# Patient Record
Sex: MALE | Race: WHITE | NOT HISPANIC OR LATINO | Employment: UNEMPLOYED | URBAN - NONMETROPOLITAN AREA
[De-identification: names, ages, dates, MRNs, and addresses within clinical notes are randomized per-mention and may not be internally consistent; named-entity substitution may affect disease eponyms.]

---

## 2017-01-30 ENCOUNTER — APPOINTMENT (OUTPATIENT)
Dept: SLEEP MEDICINE | Facility: HOSPITAL | Age: 47
End: 2017-01-30
Attending: PSYCHIATRY & NEUROLOGY

## 2017-01-30 ENCOUNTER — APPOINTMENT (OUTPATIENT)
Dept: SLEEP MEDICINE | Facility: HOSPITAL | Age: 47
End: 2017-01-30

## 2017-01-30 DIAGNOSIS — G47.34 NOCTURNAL OXYGEN DESATURATION: ICD-10-CM

## 2017-01-30 DIAGNOSIS — G47.33 OBSTRUCTIVE SLEEP APNEA: ICD-10-CM

## 2017-01-30 DIAGNOSIS — G47.19 EXCESSIVE DAYTIME SLEEPINESS: ICD-10-CM

## 2017-01-30 PROCEDURE — 95811 POLYSOM 6/>YRS CPAP 4/> PARM: CPT

## 2017-02-23 ENCOUNTER — OFFICE VISIT (OUTPATIENT)
Dept: NEUROLOGY | Facility: CLINIC | Age: 47
End: 2017-02-23

## 2017-02-23 VITALS
DIASTOLIC BLOOD PRESSURE: 82 MMHG | HEIGHT: 69 IN | BODY MASS INDEX: 46.65 KG/M2 | HEART RATE: 104 BPM | OXYGEN SATURATION: 99 % | SYSTOLIC BLOOD PRESSURE: 138 MMHG | WEIGHT: 315 LBS | RESPIRATION RATE: 16 BRPM

## 2017-02-23 DIAGNOSIS — G47.19 EXCESSIVE DAYTIME SLEEPINESS: ICD-10-CM

## 2017-02-23 DIAGNOSIS — G47.33 OBSTRUCTIVE SLEEP APNEA: Primary | ICD-10-CM

## 2017-02-23 DIAGNOSIS — G47.34 NOCTURNAL OXYGEN DESATURATION: ICD-10-CM

## 2017-02-23 PROCEDURE — 99214 OFFICE O/P EST MOD 30 MIN: CPT | Performed by: PSYCHIATRY & NEUROLOGY

## 2017-02-23 NOTE — PROGRESS NOTES
[facility] NEUROLOGY [place] PROGRESS NOTE  History of Present Illness     Date: 2/23/2017    Patient Identification  Ladarius Jones is a 46 y.o. male.    Patient information was obtained from patient.  History/Exam limitations: none.    Original consultation requested by:       Chief Complaint   Sleeping Problem and Follow-up      History of Present Illness   Patient is a delightful 46-year-old who has been diagnosed with obstructive sleep apnea.  Patient underwent nasal CPAP titration since last visit, the optimum pressure was found to be 9 cm water pressure.   REM sleep rebound was quite apparent that such pressure.    .  PMH:   Past Medical History   Diagnosis Date   • Hypertension    • Sleep apnea        Past Surgical History: No past surgical history on file.    Family Hisotry:   Family History   Problem Relation Age of Onset   • Diabetes Mother    • Diabetes Father    • Heart disease Father    • Hypertension Father    • Stroke Father        Social History:   Social History     Social History   • Marital status:      Spouse name: N/A   • Number of children: N/A   • Years of education: N/A     Occupational History   • Not on file.     Social History Main Topics   • Smoking status: Never Smoker   • Smokeless tobacco: Never Used   • Alcohol use Yes      Comment: occasional   • Drug use: No   • Sexual activity: Defer     Other Topics Concern   • Not on file     Social History Narrative       Medications:   Current Outpatient Prescriptions   Medication Sig Dispense Refill   • fexofenadine (ALLEGRA) 180 MG tablet TAKE 1 TABLET BY MOUTH DAILY  5   • fluticasone (FLONASE) 50 MCG/ACT nasal spray INHALE 2 SPRAYS BY INTRANASAL ROUTE EVERY DAY IN EACH NOSTRIL  5   • lisinopril-hydrochlorothiazide (PRINZIDE,ZESTORETIC) 20-25 MG per tablet TAKE 1 TABLET BY ORAL ROUTE EVERY DAY  5   • meloxicam (MOBIC) 15 MG tablet TAKE 1 TABLET BY ORAL ROUTE EVERY DAY AS NEEDED  5     No current facility-administered  "medications for this visit.        Allergy: No Known Allergies    Review of Systems:  Review of Systems   Constitutional: Positive for fatigue. Negative for chills and fever.   HENT: Negative for congestion, ear pain, hearing loss, rhinorrhea and sore throat.    Eyes: Negative for pain, discharge and redness.   Respiratory: Negative for cough, shortness of breath, wheezing and stridor.    Cardiovascular: Negative for chest pain, palpitations and leg swelling.   Gastrointestinal: Negative for abdominal pain, constipation, nausea and vomiting.   Endocrine: Negative for cold intolerance, heat intolerance and polyphagia.   Genitourinary: Negative for dysuria, flank pain, frequency and urgency.   Musculoskeletal: Negative for joint swelling, myalgias, neck pain and neck stiffness.   Skin: Negative for pallor, rash and wound.   Allergic/Immunologic: Negative for environmental allergies.   Neurological: Negative for dizziness, tremors, seizures, syncope, facial asymmetry, speech difficulty, weakness, light-headedness, numbness and headaches.   Hematological: Negative for adenopathy.   Psychiatric/Behavioral: Positive for sleep disturbance. Negative for confusion and hallucinations. The patient is not nervous/anxious.        Physical Exam     Vitals:    02/23/17 1012   BP: 138/82   BP Location: Right arm   Pulse: 104   Resp: 16   SpO2: 99%   Weight: (!) 342 lb (155 kg)   Height: 69\" (175.3 cm)     GENERAL: Patient is pleasant, cooperative, appears to be stated age.  Body habitus is endomorphic.  SKIN AND EXTREMITIES:  No skin rashes or lesions are noted.  No cyanosis, clubbing or edema of the extremities.    HEAD:  Head is normocephalic and atraumatic.    NECK: Neck are non-tender without thyromegaly or adenopathy.  Carotic upstrokes are 1+/4.  No cranial or cervical bruits.  The neck is supple with a full range of motion.   ENT: palate elevate symmetrically, no evidence of high arch palate, tongue midline erythema in " posterior pharynx, Mallampati Classification Class III   CARDIOVASCULAR:  Regular rate and rhythm with normal S1 and S2 without rub or gallop.  RESPIRATORY:  Clear to auscultation without wheezes or crackle   ABDOMEN:  Soft and non-tender, positive bowel sound without hepatosplenomegaly  BACK:  Back is straight without midline defect.    PSYCH:  Higher cortical function/mental status:  The patient is alert.  She is oriented x3 to time, place and person.  Recent and the remote memory appear normal.  The patient has a good fund of knowledge.  There is no visual or auditory hallucination or suicidal or homicidal ideation.  SPEECH:There is no gross evidence of aphasia, dysarthria or agnosia.      CRANIAL NERVES:  Pupils are 4mm, equal round reactive to light, reacting briskly to 2mm without afferent pupillary defect.  Visual fields are intact to confrontation testing.  Fundoscopic examination reveals sharp disk margins with normal vasculature.  No papilledema, hemorrhages or exudates.  Extraocular movements are full and smooth with normal pursuits and saccades.  No nystagmus noted.  The face is symmetric. palate elevate symmetrically, Tongue midline, positive gag reflex. The remainder of the cranial nerves are intact and symmetrical.    MOTOR: Strength is 5/5 throughout with normal tone and bulk with the following exceptions, 4/5 intrinsic muscles of the hands and feet.  No involuntary movements noted.    Deep Tendon Reflexes: are 2/4 and symmetrical in the upper extremities, 2/4 and symmetrical at the knees and 1/4 and symmetrical at the Achilles tendon.  Plantar responses were down-going bilaterally.    SENSATION:  Intact to pinprick, light touch, vibration and proprioception.  Coordination:  The patient normally performs finger-nose-finger, heel-to-knee-to-shin and rapid alternating movements in symmetrical fashion.    COORDINATION AND GAIT:  The patient walks with a narrow-based gait.  Patient is able to heel-toe  and tandem walk forward and backwards without difficulty.  Romberg and monopedal  Romberg are negative.    MUSCULOSKELETAL: Range of motion normal, no clubbing, cyanosis, or edema.  No joint swelling.            Studies: I have personally reviewed the following and discussed with the patient.  Results for orders placed or performed during the hospital encounter of 06/17/14   CBC and Differential   Result Value Ref Range    WBC 8.9 4.8 - 10.8 THOUS    RBC 5.11 4.70 - 6.10 m/uL    Hemoglobin 14.2 14.0 - 18.0 g/dL    Hematocrit 42 42 - 52 %    MCV 83.0 80.0 - 94.0 fL    MCH 27.7 27.0 - 31.0 uug    MCHC 33.4 30.0 - 37.0 g/dL    RDW 13.7 11.5 - 14.5 %    Platelets 267 130 - 400 THOUS    Neutrophil Rel % 68.7 37.0 - 80.0 %    Lymphocyte Rel % 24.2 10.0 - 50.0 %    Monocyte Rel % 5.1 0.0 - 12.0 %    Eosinophil Rel % 1.5 0.0 - 7.0 %    Basophil Rel % 0.50 0.00 - 2.50 %    Neutrophils Absolute 6.10 2.00 - 6.90 THOUS    Lymphocytes Absolute 2.20 0.60 - 3.40 THOUS    Monocytes Absolute 0.50 0.00 - 0.90 THOUS    Eosinophils Absolute 0.10 0.00 - 0.70 THOUS    Basophils Absolute 0.00 0.00 - 0.20 THOUS   Protime-INR   Result Value Ref Range    Patient On Coumadin? N     Protime 12.3 (H) 9.3 - 12.1 SEC    INR 1.1 0.9 - 1.1   APTT   Result Value Ref Range    Patient On Heparin? .     PTT 30 25 - 36 SEC   Comprehensive metabolic panel   Result Value Ref Range    Sodium 144 137 - 145 mmol/L    Potassium 4.3 3.5 - 5.1 mmol/L    Chloride 105 98 - 107 mmol/L    CO2 29 26 - 30 mmol/L    Anion Gap 14 10 - 20 mmol/L    BUN 13 7 - 20 mg/dL    Creatinine 0.8 0.6 - 1.3 mg/dL    BUN/Creatinine Ratio 16.7 6.3 - 21.9    eGFR >60 mL/min    Glucose 122 (H) 74 - 98 mg/dL    Calcium 9.0 8.4 - 10.2 mg/dL    Total Bilirubin 0.5 0.2 - 1.3 mg/dL    AST (SGOT) 105 (H) 15 - 46 U/L    ALT (SGPT) 85 (H) 13 - 69 U/L    Total Protein 7.7 6.3 - 8.2 g/dL    Albumin 4.2 3.5 - 5.0 g/dL    A/G Ratio 1.2 1.0 - 2.0    Alkaline Phosphatase 88 38 - 126 U/L   Conv  EKG-Lab   Result Value Ref Range    EKG-LAB **    Urinalysis with microscopic   Result Value Ref Range    Color, UA Yellow     Appearance, UA Clear     Glucose, UA Negative NEGATIVE    Bilirubin, UA Negative NEGATIVE    Ketones, UA Negative NEGATIVE    Specific Gravity, UA 1.020 1.003 - 1.035    Blood, UA Negative NEGATIVE    pH, UA 6.5 5.0 - 8.0    Protein, UA Negative NEGATIVE    Urobilinogen, UA 0.2 0.2    Nitrite, UA Negative NEGATIVE    Leukocytes, UA Negative NEGATIVE    WBC, UA 0-3 0 - 3    RBC, UA 0-3 0 - 3    Epithelial Cells, UA 0-3 0 - 3   Basic metabolic panel   Result Value Ref Range    Sodium 139 137 - 145 mmol/L    Potassium 4.5 3.5 - 5.1 mmol/L    Chloride 106 98 - 107 mmol/L    CO2 29 26 - 30 mmol/L    Anion Gap 9 (L) 10 - 20 mmol/L    BUN 13 7 - 20 mg/dL    Creatinine 0.8 0.6 - 1.3 mg/dL    eGFR >60 mL/min    Glucose 100 (H) 74 - 98 mg/dL    Calcium 8.9 8.4 - 10.2 mg/dL       Records Reviewed: I have personally reviewed his previous medical record.    Ladarius was seen today for sleeping problem and follow-up.    Diagnoses and all orders for this visit:    Obstructive sleep apnea    Excessive daytime sleepiness    Nocturnal oxygen desaturation    Treatments:  1. Patient was diagnosis of obstructive sleep apnea counseled patient extensively the importance of being compliant with using nasal CPAP  2.  With discussed about weight reduction would also be beneficial  3.  We have discussed about the cardio vascular complication of obstructive sleep apnea if left untreated  4.   We've counseled patient on sleep apnea is follow  I have counseled patient extensively on Sleep Hygiene including regular sleep wake schedule and stimulus control therapy.  I have also discussed the importance of weight reduction because 10% reduction in body weight can reduce sleep apnea by 50 %. We have also discussed abstaining from smoking and drinking.  I have explained to patient that obstructive apnea episode is defined as the  absence of airflow for at least 10 seconds.  Sleep apnea is usually accompanied by snoring, disturbed sleep, and daytime sleepiness. Patients with micrognathia, retrognathia, enlarged tonsils, tongue enlargement, and acromegaly are especially predisposed to obstructive sleep apnea. Abnormalities or weakness in the muscles can also contribute to obstructive sleep apnea. Obesity can also contribute to sleep apnea.     Sleep apnea can lead to a number of complications, ranging from daytime sleepiness to possible increased risk of cardiovascular risks.   Daytime sleepiness is the most serious.  Daytime sleepiness can also increase the risk for accident-related injuries. Several studies have suggested that people with sleep apnea have two to three times as many car accidents, and five to seven times the risk for multiple accidents.   A number of cardiovascular diseases -- including high blood pressure, heart failure, stroke, and heart arrhythmias -- have an association with obstructive sleep apnea.   Up to a third of patients with heart failure also have sleep apnea. Both central and obstructive sleep apnea are linked with heart failure. Obstructive sleep apnea is also noted to be associated with type 2 diabetes according to Dr. Guillaume at [facility].  The best treatment for symptomatic obstructive sleep apnea is continuous positive airflow pressure (CPAP). Bilevel positive airway pressure (BPAP) systems may be particularly helpful for patients with coexisting lung disease and those with excessive levels of carbon dioxide.  Other treatment options including UPPP surgery, LAUP surgery, radiofrequency somnoplasty and dental appliances such Kian or Clearway.    This Document is signed by Takn Burr MD, FAAN, FAASM   February 23, 20174:13 PM

## 2017-03-16 ENCOUNTER — TELEPHONE (OUTPATIENT)
Dept: NEUROLOGY | Facility: CLINIC | Age: 47
End: 2017-03-16

## 2017-04-28 ENCOUNTER — OFFICE VISIT (OUTPATIENT)
Dept: NEUROLOGY | Facility: CLINIC | Age: 47
End: 2017-04-28

## 2017-04-28 VITALS
SYSTOLIC BLOOD PRESSURE: 146 MMHG | OXYGEN SATURATION: 98 % | HEIGHT: 69 IN | HEART RATE: 93 BPM | WEIGHT: 315 LBS | BODY MASS INDEX: 46.65 KG/M2 | DIASTOLIC BLOOD PRESSURE: 90 MMHG

## 2017-04-28 DIAGNOSIS — G47.33 OBSTRUCTIVE SLEEP APNEA: Primary | ICD-10-CM

## 2017-04-28 DIAGNOSIS — G47.19 EXCESSIVE DAYTIME SLEEPINESS: ICD-10-CM

## 2017-04-28 DIAGNOSIS — G47.34 NOCTURNAL OXYGEN DESATURATION: ICD-10-CM

## 2017-04-28 PROCEDURE — 99213 OFFICE O/P EST LOW 20 MIN: CPT | Performed by: PSYCHIATRY & NEUROLOGY

## 2017-04-28 RX ORDER — AZELASTINE 1 MG/ML
SPRAY, METERED NASAL
COMMUNITY
Start: 2017-04-25

## 2017-04-29 NOTE — PROGRESS NOTES
[facility] NEUROLOGY [place] PROGRESS NOTE  History of Present Illness     Date: 4/28/2017    Patient Identification  Ladarius Jones is a 46 y.o. male.    Patient information was obtained from patient.  History/Exam limitations: none.    Original consultation requested by: Nidia Mac MD    Chief Complaint   Sleep Apnea (Pt here for follow up on [facility])      History of Present Illness   Patient is a pleasant 46-year-old with a diagnosis of obstructive sleep apnea.  Patient has been using his nasal CPAP religiously.  Patient was placed on nasal CPAP at 9 cm water pressure oxygen saturation improved to 95%.  Patient has been using his nasal CPAP 100% over the last week.  We have stressed the importance of compliant with nasal CPAP to avoid to cardiovascular patient obstructive sleep apnea with also discussed about the importance of weight reduction because 10% reduction in body weight can reduce sleep apnea by 50%.    PMH:   Past Medical History:   Diagnosis Date   • Hypertension    • Sleep apnea        Past Surgical History: No past surgical history on file.    Family Hisotry:   Family History   Problem Relation Age of Onset   • Diabetes Mother    • Diabetes Father    • Heart disease Father    • Hypertension Father    • Stroke Father        Social History:   Social History     Social History   • Marital status:      Spouse name: N/A   • Number of children: N/A   • Years of education: N/A     Occupational History   • Not on file.     Social History Main Topics   • Smoking status: Never Smoker   • Smokeless tobacco: Never Used   • Alcohol use Yes      Comment: occasional   • Drug use: No   • Sexual activity: Defer     Other Topics Concern   • Not on file     Social History Narrative       Medications:   Current Outpatient Prescriptions   Medication Sig Dispense Refill   • azelastine (ASTELIN) 0.1 % nasal spray One spray each nostril at night as needed     • fexofenadine (ALLEGRA) 180 MG tablet TAKE 1  "TABLET BY MOUTH DAILY  5   • fluticasone (FLONASE) 50 MCG/ACT nasal spray INHALE 2 SPRAYS BY INTRANASAL ROUTE EVERY DAY IN EACH NOSTRIL  5   • lisinopril-hydrochlorothiazide (PRINZIDE,ZESTORETIC) 20-25 MG per tablet TAKE 1 TABLET BY ORAL ROUTE EVERY DAY  5   • meloxicam (MOBIC) 15 MG tablet TAKE 1 TABLET BY ORAL ROUTE EVERY DAY AS NEEDED  5     No current facility-administered medications for this visit.        Allergy: No Known Allergies    Review of Systems:  Review of Systems   Constitutional: Positive for fatigue. Negative for chills and fever.   HENT: Negative for congestion, ear pain, hearing loss, rhinorrhea and sore throat.    Eyes: Negative for pain, discharge and redness.   Respiratory: Positive for apnea. Negative for cough, shortness of breath, wheezing and stridor.    Cardiovascular: Negative for chest pain, palpitations and leg swelling.   Gastrointestinal: Negative for abdominal pain, constipation, nausea and vomiting.   Endocrine: Negative for cold intolerance, heat intolerance and polyphagia.   Genitourinary: Negative for dysuria, flank pain, frequency and urgency.   Musculoskeletal: Negative for joint swelling, myalgias, neck pain and neck stiffness.   Skin: Negative for pallor, rash and wound.   Allergic/Immunologic: Negative for environmental allergies.   Neurological: Negative for dizziness, tremors, seizures, syncope, facial asymmetry, speech difficulty, weakness, light-headedness, numbness and headaches.   Hematological: Negative for adenopathy.   Psychiatric/Behavioral: Positive for sleep disturbance. Negative for confusion and hallucinations. The patient is not nervous/anxious.        Physical Exam     Vitals:    04/28/17 1123   BP: 146/90   Pulse: 93   SpO2: 98%   Weight: (!) 335 lb (152 kg)   Height: 69\" (175.3 cm)     GENERAL: Patient is pleasant, cooperative, appears to be stated age.  Body habitus is endomorphic.  SKIN AND EXTREMITIES:  No skin rashes or lesions are noted.  No cyanosis, " clubbing or edema of the extremities.    HEAD:  Head is normocephalic and atraumatic.    NECK: Neck are non-tender without thyromegaly or adenopathy.  Carotic upstrokes are 1+/4.  No cranial or cervical bruits.  The neck is supple with a full range of motion.   ENT: palate elevate symmetrically, no evidence of high arch palate, tongue midline erythema in posterior pharynx, Mallampati Classification Class III   CARDIOVASCULAR:  Regular rate and rhythm with normal S1 and S2 without rub or gallop.  RESPIRATORY:  Clear to auscultation without wheezes or crackle   ABDOMEN:  Soft and non-tender, positive bowel sound without hepatosplenomegaly  BACK:  Back is straight without midline defect.    PSYCH:  Higher cortical function/mental status:  The patient is alert.  She is oriented x3 to time, place and person.  Recent and the remote memory appear normal.  The patient has a good fund of knowledge.  There is no visual or auditory hallucination or suicidal or homicidal ideation.  SPEECH:There is no gross evidence of aphasia, dysarthria or agnosia.      CRANIAL NERVES:  Pupils are 4mm, equal round reactive to light, reacting briskly to 2mm without afferent pupillary defect.  Visual fields are intact to confrontation testing.  Fundoscopic examination reveals sharp disk margins with normal vasculature.  No papilledema, hemorrhages or exudates.  Extraocular movements are full and smooth with normal pursuits and saccades.  No nystagmus noted.  The face is symmetric. palate elevate symmetrically, Tongue midline, positive gag reflex. The remainder of the cranial nerves are intact and symmetrical.    MOTOR: Strength is 5/5 throughout with normal tone and bulk with the following exceptions, 4/5 intrinsic muscles of the hands and feet.  No involuntary movements noted.    Deep Tendon Reflexes: are 2/4 and symmetrical in the upper extremities, 2/4 and symmetrical at the knees and 1/4 and symmetrical at the Achilles tendon.  Plantar  responses were down-going bilaterally.    SENSATION:  Intact to pinprick, light touch, vibration and proprioception.  Coordination:  The patient normally performs finger-nose-finger, heel-to-knee-to-shin and rapid alternating movements in symmetrical fashion.    COORDINATION AND GAIT:  The patient walks with a narrow-based gait.  Patient is able to heel-toe and tandem walk forward and backwards without difficulty.  Romberg and monopedal  Romberg are negative.    MUSCULOSKELETAL: Range of motion normal, no clubbing, cyanosis, or edema.  No joint swelling.            Studies: I have personally reviewed the following and discussed with the patient.  Results for orders placed or performed during the hospital encounter of 06/17/14   CBC and Differential   Result Value Ref Range    WBC 8.9 4.8 - 10.8 THOUS    RBC 5.11 4.70 - 6.10 m/uL    Hemoglobin 14.2 14.0 - 18.0 g/dL    Hematocrit 42 42 - 52 %    MCV 83.0 80.0 - 94.0 fL    MCH 27.7 27.0 - 31.0 uug    MCHC 33.4 30.0 - 37.0 g/dL    RDW 13.7 11.5 - 14.5 %    Platelets 267 130 - 400 THOUS    Neutrophil Rel % 68.7 37.0 - 80.0 %    Lymphocyte Rel % 24.2 10.0 - 50.0 %    Monocyte Rel % 5.1 0.0 - 12.0 %    Eosinophil Rel % 1.5 0.0 - 7.0 %    Basophil Rel % 0.50 0.00 - 2.50 %    Neutrophils Absolute 6.10 2.00 - 6.90 THOUS    Lymphocytes Absolute 2.20 0.60 - 3.40 THOUS    Monocytes Absolute 0.50 0.00 - 0.90 THOUS    Eosinophils Absolute 0.10 0.00 - 0.70 THOUS    Basophils Absolute 0.00 0.00 - 0.20 THOUS   Protime-INR   Result Value Ref Range    Patient On Coumadin? N     Protime 12.3 (H) 9.3 - 12.1 SEC    INR 1.1 0.9 - 1.1   APTT   Result Value Ref Range    Patient On Heparin? .     PTT 30 25 - 36 SEC   Comprehensive metabolic panel   Result Value Ref Range    Sodium 144 137 - 145 mmol/L    Potassium 4.3 3.5 - 5.1 mmol/L    Chloride 105 98 - 107 mmol/L    CO2 29 26 - 30 mmol/L    Anion Gap 14 10 - 20 mmol/L    BUN 13 7 - 20 mg/dL    Creatinine 0.8 0.6 - 1.3 mg/dL     BUN/Creatinine Ratio 16.7 6.3 - 21.9    eGFR >60 mL/min    Glucose 122 (H) 74 - 98 mg/dL    Calcium 9.0 8.4 - 10.2 mg/dL    Total Bilirubin 0.5 0.2 - 1.3 mg/dL    AST (SGOT) 105 (H) 15 - 46 U/L    ALT (SGPT) 85 (H) 13 - 69 U/L    Total Protein 7.7 6.3 - 8.2 g/dL    Albumin 4.2 3.5 - 5.0 g/dL    A/G Ratio 1.2 1.0 - 2.0    Alkaline Phosphatase 88 38 - 126 U/L   Conv EKG-Lab   Result Value Ref Range    EKG-LAB **    Urinalysis with microscopic   Result Value Ref Range    Color, UA Yellow     Appearance, UA Clear     Glucose, UA Negative NEGATIVE    Bilirubin, UA Negative NEGATIVE    Ketones, UA Negative NEGATIVE    Specific Gravity, UA 1.020 1.003 - 1.035    Blood, UA Negative NEGATIVE    pH, UA 6.5 5.0 - 8.0    Protein, UA Negative NEGATIVE    Urobilinogen, UA 0.2 0.2    Nitrite, UA Negative NEGATIVE    Leukocytes, UA Negative NEGATIVE    WBC, UA 0-3 0 - 3    RBC, UA 0-3 0 - 3    Epithelial Cells, UA 0-3 0 - 3   Basic metabolic panel   Result Value Ref Range    Sodium 139 137 - 145 mmol/L    Potassium 4.5 3.5 - 5.1 mmol/L    Chloride 106 98 - 107 mmol/L    CO2 29 26 - 30 mmol/L    Anion Gap 9 (L) 10 - 20 mmol/L    BUN 13 7 - 20 mg/dL    Creatinine 0.8 0.6 - 1.3 mg/dL    eGFR >60 mL/min    Glucose 100 (H) 74 - 98 mg/dL    Calcium 8.9 8.4 - 10.2 mg/dL       Review of Imaging: I have personally reviewed the following images and discussed with the patient.  No results found.      Records Reviewed: I have personally reviewed his previous medical record.    Ladarius was seen today for sleep apnea.    Diagnoses and all orders for this visit:    Obstructive sleep apnea    Excessive daytime sleepiness    Nocturnal oxygen desaturation      Treatments:  1.  Avoid sleep deprivation  2.  Encourage recklessly wake schedule  3.  Encourage compliant with nasal CPAP  4.  Counseled patient on sleep apnea as follow  I have counseled patient extensively on Sleep Hygiene including regular sleep wake schedule and stimulus control therapy.  I  have also discussed the importance of weight reduction because 10% reduction in body weight can reduce sleep apnea by 50 %. We have also discussed abstaining from smoking and drinking.  I have explained to patient that obstructive apnea episode is defined as the absence of airflow for at least 10 seconds.  Sleep apnea is usually accompanied by snoring, disturbed sleep, and daytime sleepiness. Patients with micrognathia, retrognathia, enlarged tonsils, tongue enlargement, and acromegaly are especially predisposed to obstructive sleep apnea. Abnormalities or weakness in the muscles can also contribute to obstructive sleep apnea. Obesity can also contribute to sleep apnea.     Sleep apnea can lead to a number of complications, ranging from daytime sleepiness to possible increased risk of cardiovascular risks.   Daytime sleepiness is the most serious.  Daytime sleepiness can also increase the risk for accident-related injuries. Several studies have suggested that people with sleep apnea have two to three times as many car accidents, and five to seven times the risk for multiple accidents.   A number of cardiovascular diseases -- including high blood pressure, heart failure, stroke, and heart arrhythmias -- have an association with obstructive sleep apnea.   Up to a third of patients with heart failure also have sleep apnea. Both central and obstructive sleep apnea are linked with heart failure. Obstructive sleep apnea is also noted to be associated with type 2 diabetes according to Dr. Guillaume at [facility].  The best treatment for symptomatic obstructive sleep apnea is continuous positive airflow pressure (CPAP). Bilevel positive airway pressure (BPAP) systems may be particularly helpful for patients with coexisting lung disease and those with excessive levels of carbon dioxide.  Other treatment options including UPPP surgery, LAUP surgery, radiofrequency somnoplasty and dental appliances such [place] or  Clearway.  This Document is signed by Tank Burr MD, FAAN, FAASM   April 28, 20179:04 PM

## 2017-08-29 ENCOUNTER — OFFICE VISIT (OUTPATIENT)
Dept: NEUROLOGY | Facility: CLINIC | Age: 47
End: 2017-08-29

## 2017-08-29 VITALS
HEART RATE: 91 BPM | OXYGEN SATURATION: 98 % | DIASTOLIC BLOOD PRESSURE: 74 MMHG | WEIGHT: 315 LBS | HEIGHT: 69 IN | SYSTOLIC BLOOD PRESSURE: 144 MMHG | BODY MASS INDEX: 46.65 KG/M2

## 2017-08-29 DIAGNOSIS — G47.34 NOCTURNAL OXYGEN DESATURATION: ICD-10-CM

## 2017-08-29 DIAGNOSIS — G47.33 OBSTRUCTIVE SLEEP APNEA: Primary | ICD-10-CM

## 2017-08-29 DIAGNOSIS — G47.19 EXCESSIVE DAYTIME SLEEPINESS: ICD-10-CM

## 2017-08-29 PROCEDURE — 99213 OFFICE O/P EST LOW 20 MIN: CPT | Performed by: PSYCHIATRY & NEUROLOGY

## 2017-08-29 RX ORDER — CELECOXIB 200 MG/1
1 CAPSULE ORAL 2 TIMES DAILY
Refills: 0 | COMMUNITY
Start: 2017-07-19

## 2017-08-29 RX ORDER — TRIAMCINOLONE ACETONIDE 1 MG/G
CREAM TOPICAL
Refills: 2 | COMMUNITY
Start: 2017-07-19

## 2017-08-29 RX ORDER — TOPIRAMATE 50 MG/1
1 TABLET, FILM COATED ORAL 2 TIMES DAILY
Refills: 2 | COMMUNITY
Start: 2017-08-18

## 2017-08-29 RX ORDER — SPIRONOLACTONE 50 MG/1
1 TABLET, FILM COATED ORAL DAILY
Refills: 2 | COMMUNITY
Start: 2017-08-18

## 2017-09-02 NOTE — PROGRESS NOTES
[facility] NEUROLOGY [place] PROGRESS NOTE  History of Present Illness     Date: August 29, 2017  Patient Identification  Ladarius Jones is a 46 y.o. male.    Patient information was obtained from patient.  History/Exam limitations: none.    Original consultation requested by: Mike Ramesh D.O.  Chief Complaint   Sleep Apnea (Pt in office for 1 month follow up, CPAP compliance )      History of Present Illness   Patient is a pleasant 46-year-old referred to [facility] for evaluation of obstructive sleep apnea patient been diagnosis of obstructive sleep apnea been placed on nasal CPAP and patient been using nasal CPAP religiously and the compliance meter show a 100% compliance over the last 30 days and on the average patient sleep for 7 hours 15 minute 52 seconds and 100% is over 4 hours of sleep at night and apnea probably index was 2 events per hour.  Patient reported to be more awake and alert in the daytime patient denied any problem with her nasal CPAP.      PMH:   Past Medical History:   Diagnosis Date   • Hypertension    • Sleep apnea        Past Surgical History: No past surgical history on file.    Family Hisotry:   Family History   Problem Relation Age of Onset   • Diabetes Mother    • Diabetes Father    • Heart disease Father    • Hypertension Father    • Stroke Father        Social History:   Social History     Social History   • Marital status:      Spouse name: N/A   • Number of children: N/A   • Years of education: N/A     Occupational History   • Not on file.     Social History Main Topics   • Smoking status: Never Smoker   • Smokeless tobacco: Never Used   • Alcohol use Yes      Comment: occasional   • Drug use: No   • Sexual activity: Defer     Other Topics Concern   • Not on file     Social History Narrative       Medications:   Current Outpatient Prescriptions   Medication Sig Dispense Refill   • azelastine (ASTELIN) 0.1 % nasal spray One spray each nostril  at night as needed     • celecoxib (CeleBREX) 200 MG capsule 1 capsule 2 (Two) Times a Day.  0   • fexofenadine (ALLEGRA) 180 MG tablet TAKE 1 TABLET BY MOUTH DAILY  5   • fluticasone (FLONASE) 50 MCG/ACT nasal spray INHALE 2 SPRAYS BY INTRANASAL ROUTE EVERY DAY IN EACH NOSTRIL  5   • lisinopril-hydrochlorothiazide (PRINZIDE,ZESTORETIC) 20-25 MG per tablet TAKE 1 TABLET BY ORAL ROUTE EVERY DAY  5   • spironolactone (ALDACTONE) 50 MG tablet 1 tablet Daily.  2   • topiramate (TOPAMAX) 50 MG tablet 1 tablet 2 (Two) Times a Day.  2   • triamcinolone (KENALOG) 0.1 % cream APPLY A THIN LAYER TO THE AFFECTED AREA TWICE DAILY  2     No current facility-administered medications for this visit.        Allergy: No Known Allergies    Review of Systems:  Review of Systems   Constitutional: Positive for fatigue. Negative for chills and fever.   HENT: Negative for congestion, ear pain, hearing loss, rhinorrhea and sore throat.    Eyes: Negative for pain, discharge and redness.   Respiratory: Positive for apnea. Negative for cough, shortness of breath, wheezing and stridor.    Cardiovascular: Negative for chest pain, palpitations and leg swelling.   Gastrointestinal: Negative for abdominal pain, constipation, nausea and vomiting.   Endocrine: Negative for cold intolerance, heat intolerance and polyphagia.   Genitourinary: Negative for dysuria, flank pain, frequency and urgency.   Musculoskeletal: Negative for joint swelling, myalgias, neck pain and neck stiffness.   Skin: Negative for pallor, rash and wound.   Allergic/Immunologic: Negative for environmental allergies.   Neurological: Negative for dizziness, tremors, seizures, syncope, facial asymmetry, speech difficulty, weakness, light-headedness, numbness and headaches.   Hematological: Negative for adenopathy.   Psychiatric/Behavioral: Positive for sleep disturbance. Negative for confusion and hallucinations. The patient is not nervous/anxious.        Physical Exam     Vitals:  "   08/29/17 1611   BP: 144/74   Pulse: 91   SpO2: 98%   Weight: (!) 335 lb (152 kg)   Height: 69\" (175.3 cm)     GENERAL: Patient is pleasant, cooperative, appears to be stated age.  Body habitus is endomorphic.  SKIN AND EXTREMITIES:  No skin rashes or lesions are noted.  No cyanosis, clubbing or edema of the extremities.    HEAD:  Head is normocephalic and atraumatic.    NECK: Neck are non-tender without thyromegaly or adenopathy.  Carotic upstrokes are 1+/4.  No cranial or cervical bruits.  The neck is supple with a full range of motion.   ENT: palate elevate symmetrically, no evidence of high arch palate, tongue midline erythema in posterior pharynx, Mallampati Classification Class III   CARDIOVASCULAR:  Regular rate and rhythm with normal S1 and S2 without rub or gallop.  RESPIRATORY:  Clear to auscultation without wheezes or crackle   ABDOMEN:  Soft and non-tender, positive bowel sound without hepatosplenomegaly  BACK:  Back is straight without midline defect.    PSYCH:  Higher cortical function/mental status:  The patient is alert.  She is oriented x3 to time, place and person.  Recent and the remote memory appear normal.  The patient has a good fund of knowledge.  There is no visual or auditory hallucination or suicidal or homicidal ideation.  SPEECH:There is no gross evidence of aphasia, dysarthria or agnosia.      CRANIAL NERVES:  Pupils are 4mm, equal round reactive to light, reacting briskly to 2mm without afferent pupillary defect.  Visual fields are intact to confrontation testing.  Fundoscopic examination reveals sharp disk margins with normal vasculature.  No papilledema, hemorrhages or exudates.  Extraocular movements are full and smooth with normal pursuits and saccades.  No nystagmus noted.  The face is symmetric. palate elevate symmetrically, Tongue midline, positive gag reflex. The remainder of the cranial nerves are intact and symmetrical.    MOTOR: Strength is 5/5 throughout with normal tone " and bulk with the following exceptions, 4/5 intrinsic muscles of the hands and feet.  No involuntary movements noted.    Deep Tendon Reflexes: are 2/4 and symmetrical in the upper extremities, 2/4 and symmetrical at the knees and 1/4 and symmetrical at the Achilles tendon.  Plantar responses were down-going bilaterally.    SENSATION:  Intact to pinprick, light touch, vibration and proprioception.  Coordination:  The patient normally performs finger-nose-finger, heel-to-knee-to-shin and rapid alternating movements in symmetrical fashion.    COORDINATION AND GAIT:  The patient walks with a narrow-based gait.  Patient is able to heel-toe and tandem walk forward and backwards without difficulty.  Romberg and monopedal  Romberg are negative.    MUSCULOSKELETAL: Range of motion normal, no clubbing, cyanosis, or edema.  No joint swelling.            Studies: I have personally reviewed the following and discussed with the patient.  Results for orders placed or performed during the hospital encounter of 06/17/14   CBC and Differential   Result Value Ref Range    WBC 8.9 4.8 - 10.8 THOUS    RBC 5.11 4.70 - 6.10 m/uL    Hemoglobin 14.2 14.0 - 18.0 g/dL    Hematocrit 42 42 - 52 %    MCV 83.0 80.0 - 94.0 fL    MCH 27.7 27.0 - 31.0 uug    MCHC 33.4 30.0 - 37.0 g/dL    RDW 13.7 11.5 - 14.5 %    Platelets 267 130 - 400 THOUS    Neutrophil Rel % 68.7 37.0 - 80.0 %    Lymphocyte Rel % 24.2 10.0 - 50.0 %    Monocyte Rel % 5.1 0.0 - 12.0 %    Eosinophil Rel % 1.5 0.0 - 7.0 %    Basophil Rel % 0.50 0.00 - 2.50 %    Neutrophils Absolute 6.10 2.00 - 6.90 THOUS    Lymphocytes Absolute 2.20 0.60 - 3.40 THOUS    Monocytes Absolute 0.50 0.00 - 0.90 THOUS    Eosinophils Absolute 0.10 0.00 - 0.70 THOUS    Basophils Absolute 0.00 0.00 - 0.20 THOUS   Protime-INR   Result Value Ref Range    Patient On Coumadin? N     Protime 12.3 (H) 9.3 - 12.1 SEC    INR 1.1 0.9 - 1.1   APTT   Result Value Ref Range    Patient On Heparin? .     PTT 30 25 - 36 SEC    Comprehensive metabolic panel   Result Value Ref Range    Sodium 144 137 - 145 mmol/L    Potassium 4.3 3.5 - 5.1 mmol/L    Chloride 105 98 - 107 mmol/L    CO2 29 26 - 30 mmol/L    Anion Gap 14 10 - 20 mmol/L    BUN 13 7 - 20 mg/dL    Creatinine 0.8 0.6 - 1.3 mg/dL    BUN/Creatinine Ratio 16.7 6.3 - 21.9    eGFR >60 mL/min    Glucose 122 (H) 74 - 98 mg/dL    Calcium 9.0 8.4 - 10.2 mg/dL    Total Bilirubin 0.5 0.2 - 1.3 mg/dL    AST (SGOT) 105 (H) 15 - 46 U/L    ALT (SGPT) 85 (H) 13 - 69 U/L    Total Protein 7.7 6.3 - 8.2 g/dL    Albumin 4.2 3.5 - 5.0 g/dL    A/G Ratio 1.2 1.0 - 2.0    Alkaline Phosphatase 88 38 - 126 U/L   Conv EKG-Lab   Result Value Ref Range    EKG-LAB **    Urinalysis with microscopic   Result Value Ref Range    Color, UA Yellow     Appearance, UA Clear     Glucose, UA Negative NEGATIVE    Bilirubin, UA Negative NEGATIVE    Ketones, UA Negative NEGATIVE    Specific Gravity, UA 1.020 1.003 - 1.035    Blood, UA Negative NEGATIVE    pH, UA 6.5 5.0 - 8.0    Protein, UA Negative NEGATIVE    Urobilinogen, UA 0.2 0.2    Nitrite, UA Negative NEGATIVE    Leukocytes, UA Negative NEGATIVE    WBC, UA 0-3 0 - 3    RBC, UA 0-3 0 - 3    Epithelial Cells, UA 0-3 0 - 3   Basic metabolic panel   Result Value Ref Range    Sodium 139 137 - 145 mmol/L    Potassium 4.5 3.5 - 5.1 mmol/L    Chloride 106 98 - 107 mmol/L    CO2 29 26 - 30 mmol/L    Anion Gap 9 (L) 10 - 20 mmol/L    BUN 13 7 - 20 mg/dL    Creatinine 0.8 0.6 - 1.3 mg/dL    eGFR >60 mL/min    Glucose 100 (H) 74 - 98 mg/dL    Calcium 8.9 8.4 - 10.2 mg/dL       Records Reviewed: I have personally reviewed his previous medical record.    Ladarius was seen today for sleep apnea.    Diagnoses and all orders for this visit:    Obstructive sleep apnea    Excessive daytime sleepiness    Nocturnal oxygen desaturation      Treatments:    Discussion:  Migraine Headache  Migraine headaches are a major public health problem affecting more than 28 million persons in this  "country. Nearly 25 percent of women and 9 percent of men experience disabling migraines.    Migraine treatment depends on the duration and severity of pain, associated symptoms, degree of disability, and initial response to therapy.  A widely prescribed and effective class of medications for migraines is the 5-HT1 receptor-specific agonists (\"triptans\").  Contraindications to their use include ischemic vascular conditions, vasospastic coronary disease, uncontrolled hypertension, or other significant cardiovascular disease.  Following appropriate management of acute migraine, patients should be evaluated for initiation of preventive therapy. Factors that should prompt consideration of preventive therapy include the occurrence of two or more migraines per month with disability lasting three or more days per month; failure of, contraindication for, or adverse events from acute treatments; use of abortive medication more than twice per week; and uncommon migraine conditions (e.g., hemiplegic migraine, migraine with prolonged aura, migrainous infarction). Patient preference and cost also should be considered.  Evidence consistently supports the use of the beta blocker propranolol (Inderal) in migraine prophylaxis. Amitriptyline is a first-line agent for migraine prophylaxis and is the only antidepressant with consistent evidence supporting its effectiveness for this use. Divalproex (Depakote) and sodium valproate are well supported by evidence for use in migraine prevention.  Topamax has also been studies for migraine prophylaxis  in open label studies and double blind studies. Evidence does not support the use of diltiazem (Cardizem) in migraine prevention, and the evidence for several other calcium channel blockers, such as nifedipine (Procardia), is poor and suggests only modest effect  Menstrual Migraine can present a challenge to clinician.  Estrogen withdrawal has been shown to precipitate migraine headaches, and a " sustained elevated level of estrogen will postpone the migraine. Transdermal estrogen started just before menstruation can provide a sustained low level of estrogen, decreasing the degree of estrogen decline, and thus may prevent induction of migraines    This Document is signed by Tank Burr MD, FAAN, FAASM   August 29, 2017

## 2022-04-25 ENCOUNTER — TRANSCRIBE ORDERS (OUTPATIENT)
Dept: ADMINISTRATIVE | Facility: HOSPITAL | Age: 52
End: 2022-04-25

## 2022-04-25 DIAGNOSIS — E11.9 TYPE 2 DIABETES MELLITUS WITHOUT COMPLICATION, UNSPECIFIED WHETHER LONG TERM INSULIN USE: Primary | ICD-10-CM

## 2022-07-15 ENCOUNTER — HOSPITAL ENCOUNTER (OUTPATIENT)
Dept: NUTRITION | Facility: HOSPITAL | Age: 52
Discharge: HOME | End: 2022-07-15
Admitting: FAMILY MEDICINE

## 2022-07-15 VITALS — WEIGHT: 315 LBS | HEIGHT: 69 IN | BODY MASS INDEX: 46.65 KG/M2

## 2022-07-15 PROCEDURE — 97802 MEDICAL NUTRITION INDIV IN: CPT

## 2022-07-15 NOTE — PROGRESS NOTES
"Adult Outpatient Nutrition  Assessment    Patient Name:  Ladarius Jones  YOB: 1970  MRN: [id number]    Assessment Date:  7/15/2022    Comments:    RD met w/ pt in-person for initial nutrition assessment regarding T2DM. Pt reports that he weighs ~300# and that he often loses and regains the weight back. Pt states he does not check his blood sugars throughout the day and is unsure of what his most recent A1C lab value is. Pt states that he no longer drinks regular soda and only drinks soda w/ zero sugar.     RD reviewed consistent carbohydrate diet education w/ pt. RD recommended pt decrease his current carbohydrate consumption at mealtimes and to consume carbs in moderation.     One goal was set during the session:   1. Pair protein w/ carbohydrate at meal and snack times.     Pt agreeable and notes goal is feasible. Pt denies any further nutrition-related questions/concerns at this time. Pt does not wish to set-up a follow-up appointment at this time. Pt w/ RD contact information and is encouraged to reach out w/ questions/concerns. RD available PRN.    General Info     Row Name 07/15/22 1435       Today's Session    Person(s) attending today's session Patient     Services Used Today? No       General Information    How Well Do You Speak English? very well    Preferred Language English    Are you able to read and write English? Yes    People in Home spouse               Physical Findings     Row Name 07/15/22 1435          Physical Findings    Overall Physical Appearance obese                Anthropometrics     Row Name 07/15/22 1437 07/15/22 1435       Anthropometrics    Height -- 175.3 cm (69\")    Weight -- 152 kg (335 lb)    Age for Calculations 51 --    Height for Calculation 1.753 m (5' 9\") --    Weight for Calculation 152 kg (335 lb) --               Retired Nutritional Info/Activity     Row Name 07/15/22 1436          Eating Environment    Eating environment Family            " "Physical Activity    Are you currently involved in an activity/exercise program?  No     Reasons for Inactivity Limited activity                Home Nutrition Report     Row Name 07/15/22 1437          Home Nutrition Report    Typical Intake (Food/Fluid/EN/PN) 3 meals w/ 2 snacks                Estimated/Assessed Needs - Anthropometrics     Row Name 07/15/22 1437 07/15/22 1435       Anthropometrics    Height -- 175.3 cm (69\")    Weight -- 152 kg (335 lb)    Age for Calculations 51 --    Height for Calculation 1.753 m (5' 9\") --    Weight for Calculation 152 kg (335 lb) --       Estimated/Assessed Needs    Additional Documentation KCAL/KG (Group);Protein Requirements (Group);Fluid Requirements (Group);Estimated Calorie Needs (Group) --       Estimated Calorie Needs    Estimated Calorie Requirement (kcal/day) 2127 --       KCAL/KG    KCAL/KG 14 Kcal/Kg (kcal);Kcal/KG (kcal) comment  11 kcal/kg --    14 Kcal/Kg (kcal) 2127.37 --    KCAL/KG (kcal) 1672 --       Protein Requirements    Weight Used For Protein Calculations 72.6 kg (160 lb) --    Est Protein Requirement Amount (gms/kg) 0.8 gm protein --    Estimated Protein Requirements (gms/day) 58.06 --       Fluid Requirements    Fluid Requirements (mL/day) 2127  1mL/kcal --    RDA Method (mL) 2127 --                      Electronically signed by:  DONA MITCHELL RD  07/15/22 14:55 EDT   "

## 2022-09-02 ENCOUNTER — TRANSCRIBE ORDERS (OUTPATIENT)
Dept: ADMINISTRATIVE | Facility: HOSPITAL | Age: 52
End: 2022-09-02

## 2022-09-02 DIAGNOSIS — R42 DIZZINESS AND GIDDINESS: Primary | ICD-10-CM

## 2022-09-12 ENCOUNTER — TRANSCRIBE ORDERS (OUTPATIENT)
Dept: ADMINISTRATIVE | Facility: HOSPITAL | Age: 52
End: 2022-09-12

## 2022-09-12 DIAGNOSIS — R42 GIDDINESS: Primary | ICD-10-CM

## 2022-09-12 DIAGNOSIS — R42 DIZZINESS: ICD-10-CM

## 2022-09-14 ENCOUNTER — HOSPITAL ENCOUNTER (OUTPATIENT)
Dept: ULTRASOUND IMAGING | Facility: HOSPITAL | Age: 52
Discharge: HOME | End: 2022-09-14
Admitting: FAMILY MEDICINE

## 2022-09-14 DIAGNOSIS — R42 DIZZINESS: ICD-10-CM

## 2022-09-14 DIAGNOSIS — R42 GIDDINESS: ICD-10-CM

## 2022-09-14 PROCEDURE — 93880 EXTRACRANIAL BILAT STUDY: CPT

## 2023-04-24 RX ORDER — TAMSULOSIN HYDROCHLORIDE 0.4 MG/1
1 CAPSULE ORAL DAILY
COMMUNITY

## 2023-04-24 RX ORDER — ALLOPURINOL 300 MG/1
300 TABLET ORAL DAILY
COMMUNITY

## 2023-04-24 RX ORDER — FERROUS SULFATE 325(65) MG
325 TABLET ORAL
COMMUNITY

## 2023-04-24 RX ORDER — METFORMIN HYDROCHLORIDE 500 MG/1
500 TABLET, EXTENDED RELEASE ORAL
COMMUNITY

## 2023-04-24 RX ORDER — BUPROPION HYDROCHLORIDE 300 MG/1
300 TABLET ORAL DAILY
COMMUNITY

## 2023-04-24 RX ORDER — LORATADINE 10 MG/1
CAPSULE, LIQUID FILLED ORAL
COMMUNITY
End: 2023-05-01

## 2023-04-24 RX ORDER — LOPERAMIDE HYDROCHLORIDE 2 MG/1
2 TABLET ORAL 4 TIMES DAILY PRN
COMMUNITY
End: 2023-05-01

## 2023-05-01 ENCOUNTER — OFFICE VISIT (OUTPATIENT)
Dept: GASTROENTEROLOGY | Facility: CLINIC | Age: 53
End: 2023-05-01
Payer: COMMERCIAL

## 2023-05-01 VITALS
OXYGEN SATURATION: 99 % | WEIGHT: 261 LBS | DIASTOLIC BLOOD PRESSURE: 78 MMHG | BODY MASS INDEX: 38.54 KG/M2 | SYSTOLIC BLOOD PRESSURE: 120 MMHG | HEART RATE: 74 BPM

## 2023-05-01 DIAGNOSIS — Z12.11 ENCOUNTER FOR SCREENING FOR MALIGNANT NEOPLASM OF COLON: ICD-10-CM

## 2023-05-01 DIAGNOSIS — D50.9 IRON DEFICIENCY ANEMIA, UNSPECIFIED IRON DEFICIENCY ANEMIA TYPE: Primary | Chronic | ICD-10-CM

## 2023-05-01 PROBLEM — R73.03 PREDIABETES: Status: ACTIVE | Noted: 2021-03-11

## 2023-05-01 PROBLEM — F32.9 MAJOR DEPRESSIVE DISORDER: Status: ACTIVE | Noted: 2022-01-21

## 2023-05-01 PROBLEM — M19.90 OSTEOARTHRITIS: Status: ACTIVE | Noted: 2019-03-22

## 2023-05-01 PROBLEM — G47.33 OBSTRUCTIVE SLEEP APNEA SYNDROME: Status: ACTIVE | Noted: 2021-01-11

## 2023-05-01 PROBLEM — R42 DIZZINESS: Status: ACTIVE | Noted: 2022-09-02

## 2023-05-01 PROBLEM — N40.1 BENIGN PROSTATIC HYPERPLASIA WITH URINARY OBSTRUCTION: Status: ACTIVE | Noted: 2021-03-11

## 2023-05-01 PROBLEM — N13.8 BENIGN PROSTATIC HYPERPLASIA WITH URINARY OBSTRUCTION: Status: ACTIVE | Noted: 2021-03-11

## 2023-05-01 PROBLEM — E78.5 HYPERLIPIDEMIA: Status: ACTIVE | Noted: 2022-01-21

## 2023-05-01 PROBLEM — D64.9 ANEMIA: Status: ACTIVE | Noted: 2022-02-15

## 2023-05-01 PROBLEM — E79.0 HYPERURICEMIA: Status: ACTIVE | Noted: 2021-03-16

## 2023-05-01 PROBLEM — E11.9 TYPE 2 DIABETES MELLITUS WITHOUT COMPLICATION: Status: ACTIVE | Noted: 2022-02-15

## 2023-05-01 PROBLEM — I10 HYPERTENSIVE DISORDER: Status: ACTIVE | Noted: 2019-03-22

## 2023-05-01 PROCEDURE — 99203 OFFICE O/P NEW LOW 30 MIN: CPT | Performed by: NURSE PRACTITIONER

## 2023-05-01 RX ORDER — SODIUM CHLORIDE 9 MG/ML
70 INJECTION, SOLUTION INTRAVENOUS CONTINUOUS PRN
Status: CANCELLED | OUTPATIENT
Start: 2023-05-01

## 2023-05-01 RX ORDER — SODIUM, POTASSIUM,MAG SULFATES 17.5-3.13G
SOLUTION, RECONSTITUTED, ORAL ORAL
Qty: 344 ML | Refills: 0 | Status: SHIPPED | OUTPATIENT
Start: 2023-05-01 | End: 2023-05-08 | Stop reason: HOSPADM

## 2023-05-01 NOTE — PATIENT INSTRUCTIONS
Upper endoscopy-EGD: The indications, technique, alternatives and potential risk and complications were discussed with the patient including but not limited to bleeding, perforations, missing lesions and anesthetic complications. The patient understands and wishes to proceed with the procedure and has given their verbal consent. Written patient education information was given to the patient.   Colonoscopy: The indications, technique, alternatives and potential risk and complications were discussed with the patient including but not limited to bleeding, perforations, missing lesions and anesthetic complications. The patient understands and wishes to proceed with the procedure and has given their verbal consent. Written patient education information was given to the patient.   The patient will call if they have further questions before procedure.

## 2023-05-01 NOTE — PROGRESS NOTES
"     New Patient Consult      Date: 2023   Patient Name: Ladarius Jones  MRN: [id number]  : 1970     Primary Care Provider: Maame Higgins DO    Chief Complaint   Patient presents with   • Colon Cancer Screening   • Anemia     History of Present Illness: Ladarius Jones is a 52 y.o. male who is here today to establish care with gastroenterology for colon cancer screening and anemia.    He has a history of anemia diagnosed the first of this year. There is no history of GI bleeding, no hematemesis, melena or hematochezia. He had imaging last week and was told he had \"stage 4 cancer\" as he had metastases to the liver after biopsy, but they does not know where it is originating. He is having a port this week and meets with hematology to get more information. Unfortunately, we do not have any records.    He denies abdominal pain, nausea or vomiting. There is no history of constipation, diarrhea or changes in bowel habits. He has not had a colonoscopy or EGD in the past. He does not know any of his family history as \"no one talked about their problems\". His mother might have had polyps, but he never heard anyone mention cancer.     Subjective      Past Medical History:   Diagnosis Date   • Allergic rhinitis    • Anemia    • Benign prostate hyperplasia    • Depression    • Diabetes mellitus    • Hypertension    • Hyperuricemia    • Osteoarthritis    • Sleep apnea      Past Surgical History:   Procedure Laterality Date   • KNEE SURGERY Left    • ROTATOR CUFF REPAIR Left    • WRIST SURGERY Right      Family History   Problem Relation Age of Onset   • Asthma Mother    • Diabetes Mother    • Asthma Father    • Diabetes Father    • Heart disease Father    • Hypertension Father    • Stroke Father      Social History     Socioeconomic History   • Marital status:    Tobacco Use   • Smoking status: Never   • Smokeless tobacco: Never   Vaping Use   • Vaping Use: Never used   Substance and Sexual " Activity   • Alcohol use: Yes     Comment: occasional   • Drug use: No   • Sexual activity: Defer       Current Outpatient Medications:   •  allopurinol (ZYLOPRIM) 300 MG tablet, Take 1 tablet by mouth Daily., Disp: , Rfl:   •  azelastine (ASTELIN) 0.1 % nasal spray, One spray each nostril at night as needed, Disp: , Rfl:   •  buPROPion XL (WELLBUTRIN XL) 300 MG 24 hr tablet, Take 1 tablet by mouth Daily., Disp: , Rfl:   •  ferrous sulfate 325 (65 FE) MG tablet, Take 1 tablet by mouth Daily With Breakfast., Disp: , Rfl:   •  fluticasone (FLONASE) 50 MCG/ACT nasal spray, INHALE 2 SPRAYS BY INTRANASAL ROUTE EVERY DAY IN EACH NOSTRIL, Disp: , Rfl: 5  •  lisinopril-hydrochlorothiazide (PRINZIDE,ZESTORETIC) 20-25 MG per tablet, TAKE 1 TABLET BY ORAL ROUTE EVERY DAY, Disp: , Rfl: 5  •  metFORMIN ER (GLUCOPHAGE-XR) 500 MG 24 hr tablet, Take 1 tablet by mouth Daily With Breakfast., Disp: , Rfl:   •  spironolactone (ALDACTONE) 100 MG tablet, Take 1 tablet by mouth Daily., Disp: , Rfl: 2  •  tamsulosin (FLOMAX) 0.4 MG capsule 24 hr capsule, Take 1 capsule by mouth Daily. 2 CAPSULES BY MOUTH DAILY, Disp: , Rfl:   •  sodium-potassium-magnesium sulfates (Suprep Bowel Prep Kit) 17.5-3.13-1.6 GM/177ML solution oral solution, Use as directed for colonoscopy prep. Patient has instructions., Disp: 344 mL, Rfl: 0     No Known Allergies     The following portions of the patient's history were reviewed and updated as appropriate: allergies, current medications, past family history, past medical history, past social history, past surgical history and problem list.    Objective     Physical Exam  Vitals and nursing note reviewed.   Constitutional:       General: He is not in acute distress.     Appearance: Normal appearance. He is well-developed.   HENT:      Head: Normocephalic and atraumatic.      Mouth/Throat:      Mouth: Mucous membranes are not pale, not dry and not cyanotic.   Eyes:      General: Lids are normal.   Neck:       Trachea: Trachea normal.   Cardiovascular:      Rate and Rhythm: Normal rate.   Pulmonary:      Effort: Pulmonary effort is normal. No respiratory distress.      Breath sounds: Normal breath sounds.   Abdominal:      Tenderness: There is no abdominal tenderness.   Skin:     General: Skin is warm and dry.   Neurological:      Mental Status: He is alert and oriented to person, place, and time.   Psychiatric:         Mood and Affect: Mood normal.         Speech: Speech normal.         Behavior: Behavior normal. Behavior is cooperative.       Vitals:    05/01/23 1017   BP: 120/78   Pulse: 74   SpO2: 99%   Weight: 118 kg (261 lb)     Body mass index is 38.54 kg/m².     Results Review:   I have reviewed the patient's new clinical and imaging results.    No visits with results within 90 Day(s) from this visit.   Latest known visit with results is:   No results found for any previous visit.      Dated 12/29/2022 total bilirubin 0.4 alkaline phosphatase 91 AST 30 ALT 22 hemoglobin 13.7 hematocrit 41.4 platelet count 326, iron 32, iron saturation 13%, hepatitis C antibody screening: Nonreactive    No radiology results for the last 90 days.     Assessment / Plan      1. Iron deficiency anemia, unspecified iron deficiency anemia type  2. Encounter for screening for malignant neoplasm of colon  He was diagnosed with iron deficiency anemia the first of 2023. He denies any GI bleeding, no hematemesis, hematochezia or melena. Denies any GI symptoms. He was evaluated by hematology recently and told he had mets to the liver last week, but primary source is unknown. We do not have any records, labs or imaging other than labs from 12/29/2022. He has not had colonoscopy or EGD in the past. He does not know his family history as no one talked about medical problems.  Will schedule urgent EGD and colonoscopy for evaluation.     - Case Request  - sodium-potassium-magnesium sulfates (Suprep Bowel Prep Kit) 17.5-3.13-1.6 GM/177ML solution  oral solution; Use as directed for colonoscopy prep. Patient has instructions.  Dispense: 344 mL; Refill: 0    Patient Instructions   1. Upper endoscopy-EGD: The indications, technique, alternatives and potential risk and complications were discussed with the patient including but not limited to bleeding, perforations, missing lesions and anesthetic complications. The patient understands and wishes to proceed with the procedure and has given their verbal consent. Written patient education information was given to the patient.   2. Colonoscopy: The indications, technique, alternatives and potential risk and complications were discussed with the patient including but not limited to bleeding, perforations, missing lesions and anesthetic complications. The patient understands and wishes to proceed with the procedure and has given their verbal consent. Written patient education information was given to the patient.   3. The patient will call if they have further questions before procedure.       Anastacio Vazquez, APRN  5/1/2023    Please note that portions of this note may have been completed with a voice recognition program.

## 2023-05-05 ENCOUNTER — PATIENT ROUNDING (BHMG ONLY) (OUTPATIENT)
Dept: GASTROENTEROLOGY | Facility: CLINIC | Age: 53
End: 2023-05-05
Payer: COMMERCIAL

## 2023-05-05 NOTE — PROGRESS NOTES
May 5, 2023    Hello, may I speak with Ladarius Jones?    My name is Bambi Nayak LM    I am  with [facility]  [address]  [place] [address] [phone].    Before we get started may I verify your date of birth? 1970    I am calling to officially welcome you to our practice and ask about your recent visit. Is this a good time to talk?     Tell me about your visit with us. What things went well?         We're always looking for ways to make our patients' experiences even better. Do you have recommendations on ways we may improve?      Overall were you satisfied with your first visit to our practice?        I appreciate you taking the time to speak with me today. Is there anything else I can do for you?       Thank you, and have a great day.

## 2023-05-08 ENCOUNTER — ANESTHESIA (OUTPATIENT)
Dept: GASTROENTEROLOGY | Facility: HOSPITAL | Age: 53
End: 2023-05-08
Payer: COMMERCIAL

## 2023-05-08 ENCOUNTER — ANESTHESIA EVENT (OUTPATIENT)
Dept: GASTROENTEROLOGY | Facility: HOSPITAL | Age: 53
End: 2023-05-08
Payer: COMMERCIAL

## 2023-05-08 ENCOUNTER — HOSPITAL ENCOUNTER (OUTPATIENT)
Facility: HOSPITAL | Age: 53
Setting detail: HOSPITAL OUTPATIENT SURGERY
Discharge: HOME | End: 2023-05-08
Attending: INTERNAL MEDICINE | Admitting: INTERNAL MEDICINE
Payer: COMMERCIAL

## 2023-05-08 VITALS
OXYGEN SATURATION: 98 % | TEMPERATURE: 97 F | SYSTOLIC BLOOD PRESSURE: 118 MMHG | HEART RATE: 71 BPM | DIASTOLIC BLOOD PRESSURE: 73 MMHG | RESPIRATION RATE: 18 BRPM

## 2023-05-08 DIAGNOSIS — Z12.11 ENCOUNTER FOR SCREENING FOR MALIGNANT NEOPLASM OF COLON: ICD-10-CM

## 2023-05-08 DIAGNOSIS — D50.9 IRON DEFICIENCY ANEMIA, UNSPECIFIED IRON DEFICIENCY ANEMIA TYPE: ICD-10-CM

## 2023-05-08 LAB — GLUCOSE BLDC GLUCOMTR-MCNC: 126 MG/DL (ref 70–130)

## 2023-05-08 PROCEDURE — 45380 COLONOSCOPY AND BIOPSY: CPT | Performed by: INTERNAL MEDICINE

## 2023-05-08 PROCEDURE — 25010000002 PROPOFOL 10 MG/ML EMULSION: Performed by: NURSE ANESTHETIST, CERTIFIED REGISTERED

## 2023-05-08 PROCEDURE — 82948 REAGENT STRIP/BLOOD GLUCOSE: CPT

## 2023-05-08 PROCEDURE — 45385 COLONOSCOPY W/LESION REMOVAL: CPT | Performed by: INTERNAL MEDICINE

## 2023-05-08 PROCEDURE — 43239 EGD BIOPSY SINGLE/MULTIPLE: CPT | Performed by: INTERNAL MEDICINE

## 2023-05-08 RX ORDER — PROPOFOL 10 MG/ML
VIAL (ML) INTRAVENOUS AS NEEDED
Status: DISCONTINUED | OUTPATIENT
Start: 2023-05-08 | End: 2023-05-08 | Stop reason: SURG

## 2023-05-08 RX ORDER — SODIUM CHLORIDE 9 MG/ML
70 INJECTION, SOLUTION INTRAVENOUS CONTINUOUS PRN
Status: DISCONTINUED | OUTPATIENT
Start: 2023-05-08 | End: 2023-05-08 | Stop reason: HOSPADM

## 2023-05-08 RX ORDER — LIDOCAINE HYDROCHLORIDE 20 MG/ML
INJECTION, SOLUTION INTRAVENOUS AS NEEDED
Status: DISCONTINUED | OUTPATIENT
Start: 2023-05-08 | End: 2023-05-08 | Stop reason: SURG

## 2023-05-08 RX ORDER — SODIUM CHLORIDE 9 MG/ML
INJECTION, SOLUTION INTRAVENOUS CONTINUOUS PRN
Status: DISCONTINUED | OUTPATIENT
Start: 2023-05-08 | End: 2023-05-08 | Stop reason: SURG

## 2023-05-08 RX ORDER — SIMETHICONE 20 MG/.3ML
EMULSION ORAL AS NEEDED
Status: DISCONTINUED | OUTPATIENT
Start: 2023-05-08 | End: 2023-05-08 | Stop reason: HOSPADM

## 2023-05-08 RX ORDER — PANTOPRAZOLE SODIUM 40 MG/1
40 TABLET, DELAYED RELEASE ORAL DAILY
Qty: 30 TABLET | Refills: 5 | Status: SHIPPED | OUTPATIENT
Start: 2023-05-08

## 2023-05-08 RX ADMIN — PROPOFOL 100 MG: 10 INJECTION, EMULSION INTRAVENOUS at 09:28

## 2023-05-08 RX ADMIN — LIDOCAINE HYDROCHLORIDE 60 MG: 20 INJECTION, SOLUTION INTRAVENOUS at 09:28

## 2023-05-08 RX ADMIN — PROPOFOL 200 MCG/KG/MIN: 10 INJECTION, EMULSION INTRAVENOUS at 09:29

## 2023-05-08 RX ADMIN — SODIUM CHLORIDE 70 ML/HR: 9 INJECTION, SOLUTION INTRAVENOUS at 07:59

## 2023-05-08 RX ADMIN — SODIUM CHLORIDE: 9 INJECTION, SOLUTION INTRAVENOUS at 09:22

## 2023-05-08 NOTE — DISCHARGE INSTRUCTIONS
- Discharge patient to home (ambulatory).   - Resume previous diet.   - antireflux measures  - Continue present medications.   - PPI daily  - Await pathology results.   - may need repeat EGD/ upper EUS depend on path report    No pushing, pulling, tugging,  heavy lifting, or strenuous activity.  No major decision making, driving, or drinking alcoholic beverages for 24 hours. ( due to the medications you have  received)  Always use good hand hygiene/washing techniques.  NO driving while taking pain medications.    To assist you in voiding:  Drink plenty of fluids  Listen to running water while attempting to void.    If you are unable to urinate and you have an uncomfortable urge to void or it has been   6 hours since you were discharged, return to the Emergency Room

## 2023-05-08 NOTE — ANESTHESIA POSTPROCEDURE EVALUATION
Patient: Ladarius Jones    Procedure Summary     Date: 05/08/23 Room / Location: [facility] ENDOSCOPY [facility] / [facility]    Anesthesia Start: 0922 Anesthesia Stop: 1022    Procedures:       ESOPHAGOGASTRODUODENOSCOPY WITH BIOPSY (Esophagus)      COLONOSCOPY WITH POLYPECTOMY AND BIOPSY AND TATTOO (Anus) Diagnosis:       Iron deficiency anemia, unspecified iron deficiency anemia type      Encounter for screening for malignant neoplasm of colon      (Iron deficiency anemia, unspecified iron deficiency anemia type [D50.9])      (Encounter for screening for malignant neoplasm of colon [Z12.11])    Surgeons: Eileen Boggs MD Provider: Les Galvez CRNA    Anesthesia Type: MAC ASA Status: 3          Anesthesia Type: MAC    Vitals  No vitals data found for the desired time range.          Post Anesthesia Care and Evaluation    Patient location during evaluation: PHASE II  Patient participation: complete - patient participated  Level of consciousness: awake and alert  Pain score: 0  Pain management: satisfactory to patient    Airway patency: patent  Anesthetic complications: No anesthetic complications  PONV Status: none  Cardiovascular status: acceptable and stable  Respiratory status: acceptable, nonlabored ventilation, spontaneous ventilation and unassisted  Hydration status: acceptable    Comments: Vitals signs as noted in nursing documentation as per protocol.

## 2023-05-08 NOTE — H&P
[facility]  HISTORY AND PHYSICAL    Patient Name: Ladarius Jones  : 1970  MRN: [id number]    Chief Complaint:   For screening colonoscopy/ EGD    History Of Presenting Illness:    NILO   Liver lesion   Colon cancer screening    Past Medical History:   Diagnosis Date   • Allergic rhinitis    • Anemia    • Benign prostate hyperplasia    • Depression    • Diabetes mellitus    • Hypertension    • Hyperuricemia    • Osteoarthritis    • Sleep apnea        Past Surgical History:   Procedure Laterality Date   • KNEE SURGERY Left    • ROTATOR CUFF REPAIR Left    • WRIST SURGERY Right        Social History     Socioeconomic History   • Marital status:    Tobacco Use   • Smoking status: Never   • Smokeless tobacco: Never   Vaping Use   • Vaping Use: Never used   Substance and Sexual Activity   • Alcohol use: Yes     Comment: occasional   • Drug use: No   • Sexual activity: Defer       Family History   Problem Relation Age of Onset   • Asthma Mother    • Diabetes Mother    • Asthma Father    • Diabetes Father    • Heart disease Father    • Hypertension Father    • Stroke Father        Prior to Admission Medications:  Medications Prior to Admission   Medication Sig Dispense Refill Last Dose   • allopurinol (ZYLOPRIM) 300 MG tablet Take 1 tablet by mouth Daily.   Past Week   • azelastine (ASTELIN) 0.1 % nasal spray One spray each nostril at night as needed   Past Week   • buPROPion XL (WELLBUTRIN XL) 300 MG 24 hr tablet Take 1 tablet by mouth Daily.   Past Week   • ferrous sulfate 325 (65 FE) MG tablet Take 1 tablet by mouth Daily With Breakfast.   Past Week   • fluticasone (FLONASE) 50 MCG/ACT nasal spray INHALE 2 SPRAYS BY INTRANASAL ROUTE EVERY DAY IN EACH NOSTRIL  5 Past Week   • lisinopril-hydrochlorothiazide (PRINZIDE,ZESTORETIC) 20-25 MG per tablet TAKE 1 TABLET BY ORAL ROUTE EVERY DAY  5 Past Week   • metFORMIN ER (GLUCOPHAGE-XR) 500 MG 24 hr tablet Take 1 tablet by mouth Daily With  Breakfast.   Past Week   • sodium-potassium-magnesium sulfates (Suprep Bowel Prep Kit) 17.5-3.13-1.6 GM/177ML solution oral solution Use as directed for colonoscopy prep. Patient has instructions. 344 mL 0 5/8/2023   • spironolactone (ALDACTONE) 100 MG tablet Take 1 tablet by mouth Daily.  2 Past Week   • tamsulosin (FLOMAX) 0.4 MG capsule 24 hr capsule Take 1 capsule by mouth Daily. 2 CAPSULES BY MOUTH DAILY   Past Week       Allergies:  No Known Allergies     Vitals: Temp:  [97.7 °F (36.5 °C)] 97.7 °F (36.5 °C)  Heart Rate:  [88] 88  Resp:  [18] 18  BP: (142)/(81) 142/81    Review Of Systems:  Constitutional:  Negative for chills, fever, and unexpected weight change.  Respiratory:  Negative for cough, chest tightness, shortness of breath, and wheezing.  Cardiovascular:  Negative for chest pain, palpitations, and leg swelling.  Gastrointestinal:  Negative for abdominal distention, abdominal pain, nausea, vomiting.  Neurological:  Negative for weakness, numbness, and headaches.     Physical Exam:    General Appearance:  Alert, cooperative, in no acute distress.   Lungs:   Clear to auscultation, respirations regular, even and                 unlabored.   Heart:  Regular rhythm and normal rate.   Abdomen:   Normal bowel sounds, no masses, no organomegaly. Soft, nontender, nondistended   Neurologic: Alert and oriented x 3. Moves all four limbs equally       Assessment & Plan     Assessment:  Active Problems:    Iron deficiency anemia    Encounter for screening for malignant neoplasm of colon      Plan: ESOPHAGOGASTRODUODENOSCOPY (N/A), COLONOSCOPY (N/A)     Eileen Boggs MD  5/8/2023

## 2023-05-08 NOTE — ANESTHESIA PREPROCEDURE EVALUATION
Anesthesia Evaluation     Patient summary reviewed and Nursing notes reviewed   no history of anesthetic complications:  NPO Solid Status: > 8 hours  NPO Liquid Status: > 8 hours           Airway   Mallampati: II  TM distance: >3 FB  Neck ROM: full  no difficulty expected and Possible difficult intubation  Dental - normal exam     Pulmonary - normal exam   (+) sleep apnea,   Cardiovascular - normal exam    Rhythm: regular  Rate: normal    (+) hypertension 2 medications or greater, hyperlipidemia,       Neuro/Psych  (+) dizziness/light headedness, psychiatric history,    GI/Hepatic/Renal/Endo    (+) obesity, morbid obesity,  diabetes mellitus type 2,     Musculoskeletal (-) negative ROS    Abdominal    Substance History - negative use     OB/GYN negative ob/gyn ROS         Other - negative ROS                       Anesthesia Plan    ASA 3     MAC     (Pt told that intravenous sedation will be used as the primary anesthetic along with local anesthesia if necessary. Every effort will be made to make sure the patient is comfortable.     The patient was told they may or may not have recall for the procedure. It was further explained that if the MAC was not adequate that a general anesthetic with either an LMA or endotracheal tube would be required.     Will proceed with the plan of care.)  intravenous induction     Anesthetic plan, risks, benefits, and alternatives have been provided, discussed and informed consent has been obtained with: patient.        CODE STATUS:

## 2023-05-11 LAB — REF LAB TEST METHOD: NORMAL

## 2023-05-12 ENCOUNTER — TELEPHONE (OUTPATIENT)
Dept: GASTROENTEROLOGY | Facility: CLINIC | Age: 53
End: 2023-05-12
Payer: COMMERCIAL

## 2023-05-12 NOTE — TELEPHONE ENCOUNTER
Called patient cell phone number and his spouse cell phone number to discuss the pathology report and I am unable to reach them today.

## 2023-05-21 ENCOUNTER — TELEPHONE (OUTPATIENT)
Dept: GASTROENTEROLOGY | Facility: CLINIC | Age: 53
End: 2023-05-21
Payer: COMMERCIAL

## 2023-05-21 NOTE — TELEPHONE ENCOUNTER
Called him few days ago unable to reach him.  Multiple phone numbers on record tried tried today to contact the patient to discuss the pathology report and unable to reach him.    He has an appointment on 5/23/2023 we will discuss the pathology report and further findings on the office visit day

## 2023-05-27 ENCOUNTER — TELEPHONE (OUTPATIENT)
Dept: GASTROENTEROLOGY | Facility: CLINIC | Age: 53
End: 2023-05-27
Payer: COMMERCIAL

## 2023-05-27 NOTE — TELEPHONE ENCOUNTER
All the numbers tried to reach him today unable to reach him.     Patient has an appointment with eric next week and will discuss the pathology at that time.  He is currently followed at [facility]

## 2023-06-11 ENCOUNTER — TELEPHONE (OUTPATIENT)
Dept: GASTROENTEROLOGY | Facility: CLINIC | Age: 53
End: 2023-06-11
Payer: COMMERCIAL

## 2023-06-11 NOTE — TELEPHONE ENCOUNTER
Finally able to reach his wife Heidi today  I discussed the colonoscopy findings, pathology report.  Patient appears to have a malignant colonic mass with metastasis versus double pathology.  Patient currently managed by heme-onc at [facility] and currently receiving palliative chemo.

## 2023-06-13 ENCOUNTER — LAB (OUTPATIENT)
Dept: LAB | Facility: HOSPITAL | Age: 53
End: 2023-06-13
Payer: COMMERCIAL

## 2023-06-13 ENCOUNTER — TRANSCRIBE ORDERS (OUTPATIENT)
Dept: LAB | Facility: HOSPITAL | Age: 53
End: 2023-06-13
Payer: COMMERCIAL

## 2023-06-13 DIAGNOSIS — C18.6 MALIGNANT NEOPLASM OF DESCENDING COLON: Primary | ICD-10-CM

## 2023-06-13 DIAGNOSIS — C18.6 MALIGNANT NEOPLASM OF DESCENDING COLON: ICD-10-CM

## 2023-06-13 LAB
ALBUMIN SERPL-MCNC: 3.3 G/DL (ref 3.5–5.2)
ALBUMIN/GLOB SERPL: 1 G/DL
ALP SERPL-CCNC: 105 U/L (ref 39–117)
ALT SERPL W P-5'-P-CCNC: 33 U/L (ref 1–41)
ANION GAP SERPL CALCULATED.3IONS-SCNC: 10.2 MMOL/L (ref 5–15)
AST SERPL-CCNC: 38 U/L (ref 1–40)
BACTERIA UR QL AUTO: ABNORMAL /HPF
BASOPHILS # BLD AUTO: 0.04 10*3/MM3 (ref 0–0.2)
BASOPHILS NFR BLD AUTO: 0.7 % (ref 0–1.5)
BILIRUB SERPL-MCNC: 0.4 MG/DL (ref 0–1.2)
BILIRUB UR QL STRIP: NEGATIVE
BUN SERPL-MCNC: 16 MG/DL (ref 6–20)
BUN/CREAT SERPL: 15.7 (ref 7–25)
CALCIUM SPEC-SCNC: 9.3 MG/DL (ref 8.6–10.5)
CEA SERPL-MCNC: 209 NG/ML
CHLORIDE SERPL-SCNC: 105 MMOL/L (ref 98–107)
CLARITY UR: CLEAR
CO2 SERPL-SCNC: 22.8 MMOL/L (ref 22–29)
COLOR UR: YELLOW
CREAT SERPL-MCNC: 1.02 MG/DL (ref 0.76–1.27)
DEPRECATED RDW RBC AUTO: 45.6 FL (ref 37–54)
EGFRCR SERPLBLD CKD-EPI 2021: 87.9 ML/MIN/1.73
EOSINOPHIL # BLD AUTO: 0.11 10*3/MM3 (ref 0–0.4)
EOSINOPHIL NFR BLD AUTO: 1.8 % (ref 0.3–6.2)
ERYTHROCYTE [DISTWIDTH] IN BLOOD BY AUTOMATED COUNT: 16.6 % (ref 12.3–15.4)
GLOBULIN UR ELPH-MCNC: 3.2 GM/DL
GLUCOSE SERPL-MCNC: 97 MG/DL (ref 65–99)
GLUCOSE UR STRIP-MCNC: NEGATIVE MG/DL
HCT VFR BLD AUTO: 36.1 % (ref 37.5–51)
HGB BLD-MCNC: 11.8 G/DL (ref 13–17.7)
HGB UR QL STRIP.AUTO: NEGATIVE
HYALINE CASTS UR QL AUTO: ABNORMAL /LPF
IMM GRANULOCYTES # BLD AUTO: 0.01 10*3/MM3 (ref 0–0.05)
IMM GRANULOCYTES NFR BLD AUTO: 0.2 % (ref 0–0.5)
KETONES UR QL STRIP: NEGATIVE
LEUKOCYTE ESTERASE UR QL STRIP.AUTO: ABNORMAL
LYMPHOCYTES # BLD AUTO: 1.97 10*3/MM3 (ref 0.7–3.1)
LYMPHOCYTES NFR BLD AUTO: 33.1 % (ref 19.6–45.3)
MCH RBC QN AUTO: 26.2 PG (ref 26.6–33)
MCHC RBC AUTO-ENTMCNC: 32.7 G/DL (ref 31.5–35.7)
MCV RBC AUTO: 80.2 FL (ref 79–97)
MONOCYTES # BLD AUTO: 0.96 10*3/MM3 (ref 0.1–0.9)
MONOCYTES NFR BLD AUTO: 16.1 % (ref 5–12)
NEUTROPHILS NFR BLD AUTO: 2.87 10*3/MM3 (ref 1.7–7)
NEUTROPHILS NFR BLD AUTO: 48.1 % (ref 42.7–76)
NITRITE UR QL STRIP: NEGATIVE
NRBC BLD AUTO-RTO: 0 /100 WBC (ref 0–0.2)
PH UR STRIP.AUTO: 5.5 [PH] (ref 5–8)
PLATELET # BLD AUTO: 191 10*3/MM3 (ref 140–450)
PMV BLD AUTO: 9.9 FL (ref 6–12)
POTASSIUM SERPL-SCNC: 4 MMOL/L (ref 3.5–5.2)
PROT SERPL-MCNC: 6.5 G/DL (ref 6–8.5)
PROT UR QL STRIP: NEGATIVE
RBC # BLD AUTO: 4.5 10*6/MM3 (ref 4.14–5.8)
RBC # UR STRIP: ABNORMAL /HPF
REF LAB TEST METHOD: ABNORMAL
SODIUM SERPL-SCNC: 138 MMOL/L (ref 136–145)
SP GR UR STRIP: 1.02 (ref 1–1.03)
SQUAMOUS #/AREA URNS HPF: ABNORMAL /HPF
UROBILINOGEN UR QL STRIP: ABNORMAL
WBC # UR STRIP: ABNORMAL /HPF
WBC NRBC COR # BLD: 5.96 10*3/MM3 (ref 3.4–10.8)

## 2023-06-13 PROCEDURE — 85025 COMPLETE CBC W/AUTO DIFF WBC: CPT

## 2023-06-13 PROCEDURE — 80053 COMPREHEN METABOLIC PANEL: CPT

## 2023-06-13 PROCEDURE — 82378 CARCINOEMBRYONIC ANTIGEN: CPT

## 2023-06-13 PROCEDURE — 36415 COLL VENOUS BLD VENIPUNCTURE: CPT

## 2023-06-13 PROCEDURE — 81001 URINALYSIS AUTO W/SCOPE: CPT

## 2023-08-03 ENCOUNTER — APPOINTMENT (OUTPATIENT)
Dept: ULTRASOUND IMAGING | Facility: HOSPITAL | Age: 53
DRG: 176 | End: 2023-08-03
Payer: COMMERCIAL

## 2023-08-03 ENCOUNTER — APPOINTMENT (OUTPATIENT)
Dept: CARDIOLOGY | Facility: HOSPITAL | Age: 53
DRG: 176 | End: 2023-08-03
Payer: COMMERCIAL

## 2023-08-03 ENCOUNTER — APPOINTMENT (OUTPATIENT)
Dept: CT IMAGING | Facility: HOSPITAL | Age: 53
DRG: 176 | End: 2023-08-03
Payer: COMMERCIAL

## 2023-08-03 ENCOUNTER — HOSPITAL ENCOUNTER (INPATIENT)
Facility: HOSPITAL | Age: 53
LOS: 2 days | Discharge: HOME | DRG: 176 | End: 2023-08-05
Attending: EMERGENCY MEDICINE | Admitting: FAMILY MEDICINE
Payer: COMMERCIAL

## 2023-08-03 DIAGNOSIS — I26.09 ACUTE PULMONARY EMBOLISM WITH ACUTE COR PULMONALE, UNSPECIFIED PULMONARY EMBOLISM TYPE: Primary | ICD-10-CM

## 2023-08-03 DIAGNOSIS — I26.99 PULMONARY INFARCT: ICD-10-CM

## 2023-08-03 DIAGNOSIS — C18.9 MALIGNANT NEOPLASM OF COLON, UNSPECIFIED PART OF COLON: ICD-10-CM

## 2023-08-03 LAB
ALBUMIN SERPL-MCNC: 3.6 G/DL (ref 3.5–5.2)
ALBUMIN/GLOB SERPL: 1.1 G/DL
ALP SERPL-CCNC: 122 U/L (ref 39–117)
ALT SERPL W P-5'-P-CCNC: 27 U/L (ref 1–41)
ANION GAP SERPL CALCULATED.3IONS-SCNC: 11.4 MMOL/L (ref 5–15)
ANISOCYTOSIS BLD QL: NORMAL
APTT PPP: 32.8 SECONDS (ref 70–100)
AST SERPL-CCNC: 29 U/L (ref 1–40)
BACTERIA UR QL AUTO: ABNORMAL /HPF
BASOPHILS # BLD AUTO: 0.04 10*3/MM3 (ref 0–0.2)
BASOPHILS NFR BLD AUTO: 0.4 % (ref 0–1.5)
BH CV ECHO MEAS - AO MAX PG: 6.6 MMHG
BH CV ECHO MEAS - AO MEAN PG: 4 MMHG
BH CV ECHO MEAS - AO ROOT DIAM: 3.2 CM
BH CV ECHO MEAS - AO V2 MAX: 128 CM/SEC
BH CV ECHO MEAS - AO V2 VTI: 22.1 CM
BH CV ECHO MEAS - AVA(I,D): 3.5 CM2
BH CV ECHO MEAS - EDV(CUBED): 125.8 ML
BH CV ECHO MEAS - EDV(MOD-SP2): 112 ML
BH CV ECHO MEAS - EDV(MOD-SP4): 137 ML
BH CV ECHO MEAS - EF(MOD-BP): 61.3 %
BH CV ECHO MEAS - EF(MOD-SP2): 65.6 %
BH CV ECHO MEAS - EF(MOD-SP4): 58 %
BH CV ECHO MEAS - ESV(CUBED): 21 ML
BH CV ECHO MEAS - ESV(MOD-SP2): 38.5 ML
BH CV ECHO MEAS - ESV(MOD-SP4): 57.6 ML
BH CV ECHO MEAS - FS: 44.9 %
BH CV ECHO MEAS - IVS/LVPW: 0.78 CM
BH CV ECHO MEAS - IVSD: 1.04 CM
BH CV ECHO MEAS - LA DIMENSION: 3.7 CM
BH CV ECHO MEAS - LAT PEAK E' VEL: 8.6 CM/SEC
BH CV ECHO MEAS - LV MASS(C)D: 231.8 GRAMS
BH CV ECHO MEAS - LV MAX PG: 4.2 MMHG
BH CV ECHO MEAS - LV MEAN PG: 2 MMHG
BH CV ECHO MEAS - LV V1 MAX: 102 CM/SEC
BH CV ECHO MEAS - LV V1 VTI: 17.3 CM
BH CV ECHO MEAS - LVIDD: 5 CM
BH CV ECHO MEAS - LVIDS: 2.8 CM
BH CV ECHO MEAS - LVOT AREA: 4.5 CM2
BH CV ECHO MEAS - LVOT DIAM: 2.4 CM
BH CV ECHO MEAS - LVPWD: 1 CM
BH CV ECHO MEAS - MED PEAK E' VEL: 10.2 CM/SEC
BH CV ECHO MEAS - MV A MAX VEL: 79.6 CM/SEC
BH CV ECHO MEAS - MV DEC TIME: 0.19 MSEC
BH CV ECHO MEAS - MV E MAX VEL: 69.2 CM/SEC
BH CV ECHO MEAS - MV E/A: 0.87
BH CV ECHO MEAS - MV MAX PG: 4.4 MMHG
BH CV ECHO MEAS - MV MEAN PG: 2 MMHG
BH CV ECHO MEAS - MV V2 VTI: 17.6 CM
BH CV ECHO MEAS - MVA(VTI): 4.4 CM2
BH CV ECHO MEAS - PA ACC TIME: 0.08 SEC
BH CV ECHO MEAS - PA V2 MAX: 103 CM/SEC
BH CV ECHO MEAS - PULM A REVS DUR: 0.11 SEC
BH CV ECHO MEAS - PULM A REVS VEL: 34 CM/SEC
BH CV ECHO MEAS - PULM DIAS VEL: 33.1 CM/SEC
BH CV ECHO MEAS - PULM S/D: 1.76
BH CV ECHO MEAS - PULM SYS VEL: 58.3 CM/SEC
BH CV ECHO MEAS - RAP SYSTOLE: 3 MMHG
BH CV ECHO MEAS - RV MAX PG: 2.37 MMHG
BH CV ECHO MEAS - RV V1 MAX: 77 CM/SEC
BH CV ECHO MEAS - RV V1 VTI: 12.5 CM
BH CV ECHO MEAS - SV(LVOT): 78.3 ML
BH CV ECHO MEAS - SV(MOD-SP2): 73.5 ML
BH CV ECHO MEAS - SV(MOD-SP4): 79.4 ML
BH CV ECHO MEAS - TAPSE (>1.6): 2.8 CM
BH CV ECHO MEASUREMENTS AVERAGE E/E' RATIO: 7.36
BH CV XLRA - RV BASE: 4 CM
BH CV XLRA - RV LENGTH: 7.1 CM
BH CV XLRA - RV MID: 2.38 CM
BH CV XLRA - TDI S': 20.4 CM/SEC
BILIRUB SERPL-MCNC: 0.6 MG/DL (ref 0–1.2)
BILIRUB UR QL STRIP: NEGATIVE
BUN SERPL-MCNC: 16 MG/DL (ref 6–20)
BUN/CREAT SERPL: 16.2 (ref 7–25)
CALCIUM SPEC-SCNC: 8.9 MG/DL (ref 8.6–10.5)
CHLORIDE SERPL-SCNC: 101 MMOL/L (ref 98–107)
CLARITY UR: CLEAR
CO2 SERPL-SCNC: 22.6 MMOL/L (ref 22–29)
COLOR UR: YELLOW
CREAT SERPL-MCNC: 0.99 MG/DL (ref 0.76–1.27)
D-LACTATE SERPL-SCNC: 1.5 MMOL/L (ref 0.5–2)
DEPRECATED RDW RBC AUTO: 62.2 FL (ref 37–54)
EGFRCR SERPLBLD CKD-EPI 2021: 91.1 ML/MIN/1.73
EOSINOPHIL # BLD AUTO: 0.14 10*3/MM3 (ref 0–0.4)
EOSINOPHIL NFR BLD AUTO: 1.4 % (ref 0.3–6.2)
ERYTHROCYTE [DISTWIDTH] IN BLOOD BY AUTOMATED COUNT: 21.3 % (ref 12.3–15.4)
GLOBULIN UR ELPH-MCNC: 3.3 GM/DL
GLUCOSE SERPL-MCNC: 98 MG/DL (ref 65–99)
GLUCOSE UR STRIP-MCNC: NEGATIVE MG/DL
HCT VFR BLD AUTO: 39.3 % (ref 37.5–51)
HGB BLD-MCNC: 13 G/DL (ref 13–17.7)
HGB UR QL STRIP.AUTO: NEGATIVE
HOLD SPECIMEN: NORMAL
HOLD SPECIMEN: NORMAL
HYALINE CASTS UR QL AUTO: ABNORMAL /LPF
IMM GRANULOCYTES # BLD AUTO: 0.06 10*3/MM3 (ref 0–0.05)
IMM GRANULOCYTES NFR BLD AUTO: 0.6 % (ref 0–0.5)
INR PPP: 1.04 (ref 0.9–1.1)
KETONES UR QL STRIP: NEGATIVE
LEFT ATRIUM VOLUME INDEX: 21.1 ML/M2
LEUKOCYTE ESTERASE UR QL STRIP.AUTO: ABNORMAL
LIPASE SERPL-CCNC: 99 U/L (ref 13–60)
LV EF 2D ECHO EST: 64 %
LYMPHOCYTES # BLD AUTO: 2.03 10*3/MM3 (ref 0.7–3.1)
LYMPHOCYTES NFR BLD AUTO: 20.7 % (ref 19.6–45.3)
MCH RBC QN AUTO: 27.7 PG (ref 26.6–33)
MCHC RBC AUTO-ENTMCNC: 33.1 G/DL (ref 31.5–35.7)
MCV RBC AUTO: 83.6 FL (ref 79–97)
MONOCYTES # BLD AUTO: 1.03 10*3/MM3 (ref 0.1–0.9)
MONOCYTES NFR BLD AUTO: 10.5 % (ref 5–12)
NEUTROPHILS NFR BLD AUTO: 6.53 10*3/MM3 (ref 1.7–7)
NEUTROPHILS NFR BLD AUTO: 66.4 % (ref 42.7–76)
NITRITE UR QL STRIP: NEGATIVE
NRBC BLD AUTO-RTO: 0 /100 WBC (ref 0–0.2)
NT-PROBNP SERPL-MCNC: <36 PG/ML (ref 0–900)
PH UR STRIP.AUTO: 6 [PH] (ref 5–8)
PLAT MORPH BLD: NORMAL
PLATELET # BLD AUTO: 174 10*3/MM3 (ref 140–450)
PMV BLD AUTO: 10.2 FL (ref 6–12)
POTASSIUM SERPL-SCNC: 4.5 MMOL/L (ref 3.5–5.2)
PROT SERPL-MCNC: 6.9 G/DL (ref 6–8.5)
PROT UR QL STRIP: NEGATIVE
PROTHROMBIN TIME: 14.1 SECONDS (ref 12.3–15.1)
RBC # BLD AUTO: 4.7 10*6/MM3 (ref 4.14–5.8)
RBC # UR STRIP: ABNORMAL /HPF
REF LAB TEST METHOD: ABNORMAL
SODIUM SERPL-SCNC: 135 MMOL/L (ref 136–145)
SP GR UR STRIP: 1.02 (ref 1–1.03)
SQUAMOUS #/AREA URNS HPF: ABNORMAL /HPF
TROPONIN T SERPL HS-MCNC: 8 NG/L
UFH PPP CHRO-ACNC: 0.1 IU/ML (ref 0.3–0.7)
UFH PPP CHRO-ACNC: 0.75 IU/ML (ref 0.3–0.7)
UROBILINOGEN UR QL STRIP: ABNORMAL
WBC # UR STRIP: ABNORMAL /HPF
WBC MORPH BLD: NORMAL
WBC NRBC COR # BLD: 9.83 10*3/MM3 (ref 3.4–10.8)
WHOLE BLOOD HOLD COAG: NORMAL
WHOLE BLOOD HOLD SPECIMEN: NORMAL

## 2023-08-03 PROCEDURE — 85007 BL SMEAR W/DIFF WBC COUNT: CPT

## 2023-08-03 PROCEDURE — 94799 UNLISTED PULMONARY SVC/PX: CPT

## 2023-08-03 PROCEDURE — 83605 ASSAY OF LACTIC ACID: CPT

## 2023-08-03 PROCEDURE — 25010000002 MORPHINE PER 10 MG: Performed by: NURSE PRACTITIONER

## 2023-08-03 PROCEDURE — 25010000002 HEPARIN (PORCINE) PER 1000 UNITS: Performed by: PHYSICIAN ASSISTANT

## 2023-08-03 PROCEDURE — 99285 EMERGENCY DEPT VISIT HI MDM: CPT

## 2023-08-03 PROCEDURE — 83690 ASSAY OF LIPASE: CPT

## 2023-08-03 PROCEDURE — 25510000001 IOPAMIDOL 61 % SOLUTION: Performed by: EMERGENCY MEDICINE

## 2023-08-03 PROCEDURE — 83880 ASSAY OF NATRIURETIC PEPTIDE: CPT | Performed by: PHYSICIAN ASSISTANT

## 2023-08-03 PROCEDURE — 85025 COMPLETE CBC W/AUTO DIFF WBC: CPT

## 2023-08-03 PROCEDURE — 93970 EXTREMITY STUDY: CPT

## 2023-08-03 PROCEDURE — 99222 1ST HOSP IP/OBS MODERATE 55: CPT | Performed by: INTERNAL MEDICINE

## 2023-08-03 PROCEDURE — 71275 CT ANGIOGRAPHY CHEST: CPT

## 2023-08-03 PROCEDURE — 85520 HEPARIN ASSAY: CPT | Performed by: PHYSICIAN ASSISTANT

## 2023-08-03 PROCEDURE — 74177 CT ABD & PELVIS W/CONTRAST: CPT

## 2023-08-03 PROCEDURE — 81001 URINALYSIS AUTO W/SCOPE: CPT

## 2023-08-03 PROCEDURE — 85520 HEPARIN ASSAY: CPT | Performed by: NURSE PRACTITIONER

## 2023-08-03 PROCEDURE — 85610 PROTHROMBIN TIME: CPT | Performed by: PHYSICIAN ASSISTANT

## 2023-08-03 PROCEDURE — 93306 TTE W/DOPPLER COMPLETE: CPT

## 2023-08-03 PROCEDURE — 99222 1ST HOSP IP/OBS MODERATE 55: CPT | Performed by: NURSE PRACTITIONER

## 2023-08-03 PROCEDURE — 84484 ASSAY OF TROPONIN QUANT: CPT | Performed by: PHYSICIAN ASSISTANT

## 2023-08-03 PROCEDURE — 85730 THROMBOPLASTIN TIME PARTIAL: CPT | Performed by: PHYSICIAN ASSISTANT

## 2023-08-03 PROCEDURE — 93306 TTE W/DOPPLER COMPLETE: CPT | Performed by: INTERNAL MEDICINE

## 2023-08-03 PROCEDURE — 80053 COMPREHEN METABOLIC PANEL: CPT

## 2023-08-03 PROCEDURE — 93005 ELECTROCARDIOGRAM TRACING: CPT | Performed by: PHYSICIAN ASSISTANT

## 2023-08-03 PROCEDURE — 94660 CPAP INITIATION&MGMT: CPT

## 2023-08-03 RX ORDER — HEPARIN SODIUM 10000 [USP'U]/100ML
11.3 INJECTION, SOLUTION INTRAVENOUS
Status: DISPENSED | OUTPATIENT
Start: 2023-08-03 | End: 2023-08-04

## 2023-08-03 RX ORDER — NITROGLYCERIN 0.4 MG/1
0.4 TABLET SUBLINGUAL
Status: DISCONTINUED | OUTPATIENT
Start: 2023-08-03 | End: 2023-08-05 | Stop reason: HOSPADM

## 2023-08-03 RX ORDER — ALLOPURINOL 100 MG/1
300 TABLET ORAL DAILY
Status: DISCONTINUED | OUTPATIENT
Start: 2023-08-03 | End: 2023-08-05 | Stop reason: HOSPADM

## 2023-08-03 RX ORDER — FLUTICASONE PROPIONATE 50 MCG
1 SPRAY, SUSPENSION (ML) NASAL DAILY
Status: DISCONTINUED | OUTPATIENT
Start: 2023-08-03 | End: 2023-08-05 | Stop reason: HOSPADM

## 2023-08-03 RX ORDER — SODIUM CHLORIDE 0.9 % (FLUSH) 0.9 %
10 SYRINGE (ML) INJECTION AS NEEDED
Status: DISCONTINUED | OUTPATIENT
Start: 2023-08-03 | End: 2023-08-05 | Stop reason: HOSPADM

## 2023-08-03 RX ORDER — SPIRONOLACTONE 25 MG/1
100 TABLET ORAL DAILY
Status: DISCONTINUED | OUTPATIENT
Start: 2023-08-03 | End: 2023-08-05 | Stop reason: HOSPADM

## 2023-08-03 RX ORDER — PANTOPRAZOLE SODIUM 40 MG/1
40 TABLET, DELAYED RELEASE ORAL DAILY
Status: DISCONTINUED | OUTPATIENT
Start: 2023-08-03 | End: 2023-08-05 | Stop reason: HOSPADM

## 2023-08-03 RX ORDER — TAMSULOSIN HYDROCHLORIDE 0.4 MG/1
0.4 CAPSULE ORAL DAILY
Status: DISCONTINUED | OUTPATIENT
Start: 2023-08-03 | End: 2023-08-05 | Stop reason: HOSPADM

## 2023-08-03 RX ORDER — SODIUM CHLORIDE 9 MG/ML
40 INJECTION, SOLUTION INTRAVENOUS AS NEEDED
Status: DISCONTINUED | OUTPATIENT
Start: 2023-08-03 | End: 2023-08-05 | Stop reason: HOSPADM

## 2023-08-03 RX ORDER — SODIUM CHLORIDE 0.9 % (FLUSH) 0.9 %
10 SYRINGE (ML) INJECTION EVERY 12 HOURS SCHEDULED
Status: DISCONTINUED | OUTPATIENT
Start: 2023-08-03 | End: 2023-08-05 | Stop reason: HOSPADM

## 2023-08-03 RX ORDER — ONDANSETRON 4 MG/1
4 TABLET, FILM COATED ORAL EVERY 6 HOURS PRN
Status: DISCONTINUED | OUTPATIENT
Start: 2023-08-03 | End: 2023-08-05 | Stop reason: HOSPADM

## 2023-08-03 RX ORDER — HEPARIN SODIUM 1000 [USP'U]/ML
80 INJECTION, SOLUTION INTRAVENOUS; SUBCUTANEOUS ONCE
Status: COMPLETED | OUTPATIENT
Start: 2023-08-03 | End: 2023-08-03

## 2023-08-03 RX ORDER — LORAZEPAM 0.5 MG/1
0.5 TABLET ORAL EVERY 8 HOURS PRN
Status: DISCONTINUED | OUTPATIENT
Start: 2023-08-03 | End: 2023-08-05 | Stop reason: HOSPADM

## 2023-08-03 RX ORDER — NALOXONE HCL 0.4 MG/ML
0.4 VIAL (ML) INJECTION
Status: DISCONTINUED | OUTPATIENT
Start: 2023-08-03 | End: 2023-08-05 | Stop reason: HOSPADM

## 2023-08-03 RX ORDER — LISINOPRIL 20 MG/1
20 TABLET ORAL
Status: DISCONTINUED | OUTPATIENT
Start: 2023-08-03 | End: 2023-08-05 | Stop reason: HOSPADM

## 2023-08-03 RX ORDER — BISACODYL 5 MG/1
5 TABLET, DELAYED RELEASE ORAL DAILY PRN
Status: DISCONTINUED | OUTPATIENT
Start: 2023-08-03 | End: 2023-08-05 | Stop reason: HOSPADM

## 2023-08-03 RX ORDER — AZELASTINE 1 MG/ML
1 SPRAY, METERED NASAL DAILY
Status: DISCONTINUED | OUTPATIENT
Start: 2023-08-04 | End: 2023-08-05 | Stop reason: HOSPADM

## 2023-08-03 RX ORDER — HYDROCHLOROTHIAZIDE 25 MG/1
25 TABLET ORAL
Status: DISCONTINUED | OUTPATIENT
Start: 2023-08-03 | End: 2023-08-05 | Stop reason: HOSPADM

## 2023-08-03 RX ORDER — LEVOTHYROXINE SODIUM 0.05 MG/1
50 TABLET ORAL EVERY MORNING
Status: DISCONTINUED | OUTPATIENT
Start: 2023-08-04 | End: 2023-08-05 | Stop reason: HOSPADM

## 2023-08-03 RX ORDER — CHOLECALCIFEROL (VITAMIN D3) 125 MCG
5 CAPSULE ORAL NIGHTLY PRN
Status: DISCONTINUED | OUTPATIENT
Start: 2023-08-03 | End: 2023-08-05 | Stop reason: HOSPADM

## 2023-08-03 RX ORDER — BISACODYL 10 MG
10 SUPPOSITORY, RECTAL RECTAL DAILY PRN
Status: DISCONTINUED | OUTPATIENT
Start: 2023-08-03 | End: 2023-08-05 | Stop reason: HOSPADM

## 2023-08-03 RX ORDER — HEPARIN SODIUM 1000 [USP'U]/ML
2000 INJECTION, SOLUTION INTRAVENOUS; SUBCUTANEOUS AS NEEDED
Status: DISCONTINUED | OUTPATIENT
Start: 2023-08-03 | End: 2023-08-03

## 2023-08-03 RX ORDER — BUPROPION HYDROCHLORIDE 150 MG/1
300 TABLET ORAL DAILY
Status: DISCONTINUED | OUTPATIENT
Start: 2023-08-03 | End: 2023-08-05 | Stop reason: HOSPADM

## 2023-08-03 RX ORDER — POLYETHYLENE GLYCOL 3350 17 G/17G
17 POWDER, FOR SOLUTION ORAL DAILY PRN
Status: DISCONTINUED | OUTPATIENT
Start: 2023-08-03 | End: 2023-08-05 | Stop reason: HOSPADM

## 2023-08-03 RX ORDER — ONDANSETRON 2 MG/ML
4 INJECTION INTRAMUSCULAR; INTRAVENOUS EVERY 6 HOURS PRN
Status: DISCONTINUED | OUTPATIENT
Start: 2023-08-03 | End: 2023-08-05 | Stop reason: HOSPADM

## 2023-08-03 RX ORDER — AMOXICILLIN 250 MG
2 CAPSULE ORAL 2 TIMES DAILY
Status: DISCONTINUED | OUTPATIENT
Start: 2023-08-03 | End: 2023-08-05 | Stop reason: HOSPADM

## 2023-08-03 RX ORDER — HEPARIN SODIUM 1000 [USP'U]/ML
4000 INJECTION, SOLUTION INTRAVENOUS; SUBCUTANEOUS AS NEEDED
Status: DISCONTINUED | OUTPATIENT
Start: 2023-08-03 | End: 2023-08-03

## 2023-08-03 RX ORDER — OXYCODONE HYDROCHLORIDE AND ACETAMINOPHEN 5; 325 MG/1; MG/1
1 TABLET ORAL EVERY 4 HOURS PRN
Status: DISCONTINUED | OUTPATIENT
Start: 2023-08-03 | End: 2023-08-05 | Stop reason: HOSPADM

## 2023-08-03 RX ADMIN — TAMSULOSIN HYDROCHLORIDE 0.4 MG: 0.4 CAPSULE ORAL at 17:45

## 2023-08-03 RX ADMIN — BUPROPION HYDROCHLORIDE 300 MG: 150 TABLET, FILM COATED, EXTENDED RELEASE ORAL at 17:45

## 2023-08-03 RX ADMIN — OXYCODONE HYDROCHLORIDE AND ACETAMINOPHEN 1 TABLET: 5; 325 TABLET ORAL at 20:12

## 2023-08-03 RX ADMIN — IOPAMIDOL 100 ML: 612 INJECTION, SOLUTION INTRAVENOUS at 12:34

## 2023-08-03 RX ADMIN — PANTOPRAZOLE SODIUM 40 MG: 40 TABLET, DELAYED RELEASE ORAL at 17:45

## 2023-08-03 RX ADMIN — HYDROCHLOROTHIAZIDE 25 MG: 25 TABLET ORAL at 17:45

## 2023-08-03 RX ADMIN — MORPHINE SULFATE 4 MG: 4 INJECTION, SOLUTION INTRAMUSCULAR; INTRAVENOUS at 17:45

## 2023-08-03 RX ADMIN — ALLOPURINOL 300 MG: 100 TABLET ORAL at 17:45

## 2023-08-03 RX ADMIN — HEPARIN SODIUM 12.3 UNITS/KG/HR: 10000 INJECTION, SOLUTION INTRAVENOUS at 14:26

## 2023-08-03 RX ADMIN — LISINOPRIL 20 MG: 20 TABLET ORAL at 17:45

## 2023-08-03 RX ADMIN — Medication 10 ML: at 20:12

## 2023-08-03 RX ADMIN — SPIRONOLACTONE 100 MG: 25 TABLET, FILM COATED ORAL at 17:45

## 2023-08-03 RX ADMIN — SENNOSIDES AND DOCUSATE SODIUM 2 TABLET: 50; 8.6 TABLET ORAL at 20:12

## 2023-08-03 RX ADMIN — Medication 10 ML: at 17:46

## 2023-08-03 RX ADMIN — HEPARIN SODIUM 9760 UNITS: 1000 INJECTION INTRAVENOUS; SUBCUTANEOUS at 14:23

## 2023-08-03 NOTE — PHARMACY RECOMMENDATION
HEPARIN INFUSION  Ladarius Jones is a  52 y.o. male receiving heparin infusion.     Therapy for (VTE/Cardiac):   VTE  Patient Weight: 122 kg  Initial Bolus (Y/N):   Y  Any Bolus (Y/N):   Y        Signs or Symptoms of Bleeding:       VTE (PE/DVT)   Initial Bolus: 80 units/kg (Max 10,000 units)  Initial rate: 18 units/kg/hr (Max 1,500 units/hr)    Anti Xa Rebolus Infusion Hold time Change infusion Dose (Units/kg/hr) Next Anti Xa Level Due   < 0.11 50 Units/kg  (4000 Units Max) None Increase by  4 Units/kg/hr 6 hours   0.11 - 0.19 25 Units/kg  (2000 Units Max) None Increase by  3 Units/kg/hr 6 hours   0.2 - 0.29 0 None Increase by  2 Units/kg/hr 6 hours   0.3 - 0.7 0 None No Change 6 hours (after 2 consecutive levels in range check qAM)   0.71 - 0.8 0 None Decrease by  1 Units/kg/hr 6 hours   0.81 - 0.9 0 None Decrease by  2 Units/kg/hr 6 hours   0.91 - 1 0 60 Minutes Decrease by  3 Units/kg/hr 6 hours   >1 0 Hold  After Anti Xa less than 0.7 decrease previous rate by  4 Units/kg/hr  Every 2 hours until Anti Xa is less than 0.7 then when infusion restarts in 6 hours     Recommend anti-Xa every 6 hours.     Results from last 7 days   Lab Units 08/03/23  1122   HEMOGLOBIN g/dL 13.0   HEMATOCRIT % 39.3   PLATELETS 10*3/mm3 174          Date   Time   Anti-Xa Current Rate (Unit/kg/hr) Bolus   (Units) Rate Change   (Unit/kg/hr) New Rate (Unit/kg/hr) Next   Anti-Xa Comments  Pump Check Daily   8/3/23 1345 TBD NEW 9760 +12.3 12.3 2000 D/W Tammy, DEVORAH                                                                                                                                                                                                                                 Pharmacy will continue to follow anti-Xa results and monitor for signs and symptoms of bleeding or thrombosis.    Ketty Campos, PharmD  08/03/23 13:35 EDT

## 2023-08-03 NOTE — CONSULTS
"Date of consultation:   August 3, 2023    Requested by:    Hospitalist Service. MARTA Richardson    PCP: Maame Higgins DO    Reason:  PE    History of Present Illness:  52 y.o. male with Stage IV colorectal cancer s/p recent PET scan showing good response per Oncology note and on palliative systemic treatment with FOLFOX/bevacizumab s/p 6 cycles.  He finished his last cycle last week and on Friday his wife removed his \"chemo bomb\" without difficulty.  Later in the day he noticed right-sided lower chest/right upper quadrant pain, but did not really notice significant shortness of breath.  This pain waxed and waned over the weekend, but then became constant.  He did contact his outpatient physician and when they noted the had not gotten better with conservative measures they asked him to come to the emergency room today.  Imaging studies have shown pulmonary emboli as detailed below.    Patient did not really notice much shortness of breath until the last 1 to 2 days.  He noticed even here trying to go to and from the CT scan was very tiring for him.  He is currently requiring a couple liters nasal cannula to maintain his sats.  He denies any hemoptysis, cough, hematuria, bright red blood per rectum or melanotic stools.  States he is not really had much difficulty with that even with his chemotherapy and cancer diagnosis.  He denies any recent long car rides or airplane travel.  States he was relatively active until the pain started becoming a problem over the weekend.  He has no known family history of blood clots.    He is being started on heparin drip.  His BNP and troponin level have been normal.    Pulmonary Consultation was requested for further recommendations.       Review of System: All other review of systems negative except indicated in HPI.      Past Medical History:  Past Medical History:   Diagnosis Date    Allergic rhinitis     Anemia     Benign prostate hyperplasia     Depression     " "Diabetes mellitus     Hypertension     Hyperuricemia     Osteoarthritis     Sleep apnea          Past Surgical History:  Past Surgical History:   Procedure Laterality Date    COLONOSCOPY N/A 5/8/2023    Procedure: COLONOSCOPY WITH POLYPECTOMY AND BIOPSY AND TATTOO;  Surgeon: Eileen Boggs MD;  Location: [facility];  Service: Gastroenterology;  Laterality: N/A;    ENDOSCOPY N/A 5/8/2023    Procedure: ESOPHAGOGASTRODUODENOSCOPY WITH BIOPSY;  Surgeon: Eileen Boggs MD;  Location: [facility];  Service: Gastroenterology;  Laterality: N/A;    KNEE SURGERY Left     ROTATOR CUFF REPAIR Left     WRIST SURGERY Right          Family History:  Family History   Problem Relation Age of Onset    Asthma Mother     Diabetes Mother     Asthma Father     Diabetes Father     Heart disease Father     Hypertension Father     Stroke Father     Colon cancer Neg Hx          Social History:  Social History     Socioeconomic History    Marital status:    Tobacco Use    Smoking status: Never    Smokeless tobacco: Never   Vaping Use    Vaping Use: Never used   Substance and Sexual Activity    Alcohol use: Yes     Comment: occasional    Drug use: No    Sexual activity: Defer         Physical Exam:  /82   Pulse 100   Temp 98.1 øF (36.7 øC) (Oral)   Resp 17   Ht 175.3 cm (69\")   Wt 122 kg (270 lb)   SpO2 97%   BMI 39.87 kg/mý   Constitutional:Vital signs reviewed           General:  No respiratory distress noted when speaking in complete sentences  Eyes: Extraocular movement was intact, non-icteric sclera  ENT:No nasal discharge noted. Oropharynx is moist and non-erythematous  Neck: Supple.  No JVD noted.Trachea midline  Cardiovascular: S1 + S2.  Regular rate  Lungs/Respiratory: Good air movement.  Clear to ascultation bilaterally.  Right subcutaneous port in place.  No erythema at site.  No fluctuance noted.  Abdomen/GI: Soft.  Bowel sounds were positive.  No distension noted.  MSK/Extremities: No " significant edema noted. No cyanosis noted. No clubbing noted  Neurologic: Awake, alert and oriented x 3.Able to follow simple commands.  Psychiatric: Normal mood and affect. Does not appear anxious on exam  Skin: No rash noted on visible skin. No excessive bruising noted.      Labs: Reviewed. Pertinent labs were noted.     Results from last 7 days   Lab Units 08/03/23  1122   WBC 10*3/mm3 9.83   HEMOGLOBIN g/dL 13.0   HEMATOCRIT % 39.3   PLATELETS 10*3/mm3 174   NEUTROPHIL % % 66.4   NEUTROS ABS 10*3/mm3 6.53   EOSINOPHIL % % 1.4   EOS ABS 10*3/mm3 0.14   LYMPHOCYTE % % 20.7   LYMPHS ABS 10*3/mm3 2.03       Results from last 7 days   Lab Units 08/03/23  1122   SODIUM mmol/L 135*   POTASSIUM mmol/L 4.5   CHLORIDE mmol/L 101   CO2 mmol/L 22.6   BUN mg/dL 16   CREATININE mg/dL 0.99   CALCIUM mg/dL 8.9   ANION GAP mmol/L 11.4   BILIRUBIN mg/dL 0.6   ALK PHOS U/L 122*   ALT (SGPT) U/L 27   AST (SGOT) U/L 29   GLUCOSE mg/dL 98   TOTAL PROTEIN g/dL 6.9   ALBUMIN g/dL 3.6             Lab Results   Component Value Date    PROBNP <36.0 08/03/2023       Lab Results   Component Value Date    INR 1.04 08/03/2023    INR 1.05 05/02/2023    INR 1.1 06/12/2014       No results found for: PROCALCITO    No results found for: CRP    No results found for: SEDRATE    ABG: No results found for: PHART, KAC0NGY, PO2ART, HGBBG, U6UFFLNI, CFIO2, FCOHB, CARBOXYHGB, FMETHB      Micro: As of August 3, 2023   No results found for: RESPCX  No results found for: BLOODCX  Lab Results   Component Value Date    URINECX >100,000 CFU/mL Mixed Lucinda Isolated 07/11/2023     No results found for: MRSACX  No results found for: MRSAPCR  No results found for: URCX  No components found for: LOWRESPCF  No results found for: THROATCX  No results found for: CULTURES  No components found for: STREPBCX  No results found for: STREPPNEUAG  No results found for: LEGIONELLA  No results found for: MYCOPLASCX  No results found for: GCCX  No results found for:  WOUNDCX  No results found for: BODYFLDCX    No results found for: FLU    No results found for: ADENOVIRUS  No results found for: MB433F  No results found for: CVHKU1  No results found for: CVNL63  No results found for: CVOC43  No results found for: HUMETPNEVS  No results found for: HURVEV  No results found for: FLUBPCR  No results found for: PARAINFLUE  No results found for: PARAFLUV2  No results found for: PARAFLUV3  No results found for: PARAFLUV4  No results found for: BPERTPCR  No results found for: [id number]  No results found for: CPNEUPCR  No results found for: MPNEUMO  No results found for: FLUAPCR  No results found for: FLUAH3  No results found for: FLUAH1  No results found for: RSV  No results found for: BPARAPCR    COVID 19:  No results found for: COVID19    No results found for: THCURSCR  No results found for: PCPUR  No results found for: COCAINEUR  No results found for: METAMPSCNUR  No results found for: LABOPIASCN  No results found for: AMPHETSCREEN  No results found for: LABBENZSCN  No results found for: TRICYCLICSCN  No results found for: LABMETHSCN  No results found for: BARBITSCNUR  No results found for: OXYCODONESCN  No results found for: PROPOXSCN  No results found for: BUPRENORSCNU  No results found for: ETHANOLMGDL  No results found for: ETOHPCT       heparin, 12.3 Units/kg/hr  Pharmacy to Dose Heparin,         Imaging Study: Latest imaging studies was reviewed personally.   Imaging Results (Last 72 Hours)       Procedure Component Value Units Date/Time    CT Abdomen Pelvis With Contrast [[id number]] Collected: 08/03/23 1213     Updated: 08/03/23 1330    Narrative:      CT SCAN OF THE CHEST, ABDOMEN AND PELVIS WITH CONTRAST    8/3/2023 1:17  PM      HISTORY:   right flank pain .     PROCEDURE:   Axial CT images were obtained from the lung apex to the pubic symphysis  following IV contrast administration. Coronal and sagittal reformatted  images were generated from the axial data set and provided  for  interpretation. This study was performed with techniques to keep  radiation doses as low as reasonably achievable, (ALARA). Individualized  dose reduction techniques using automated exposure control or adjustment  of mA and/or kV according to the patient size were employed.     COMPARISON:   CT abdomen and pelvis dated 2/4/2016.     FINDINGS:      CHEST:   Lungs/Pleura:  Central airways are patent. Right lower lobe large peripheral  wedge-shaped opacity, likely a pulmonary infarct. There is a 5 mm  pulmonary nodule in the left lower lobe. There are a few tiny pulmonary  nodules in the anterior right upper lobe. No pneumothorax or pleural  effusion.     Vessels:  There are multiple large intraluminal filling defects within the right  lower lobe lobar, segmental and subsegmental pulmonary arteries,  compatible with acute pulmonary embolism. There are few scattered areas  of left lower lobe subsegmental filling defects, compatible with acute  pulmonary emboli. Thoracic aorta is normal in caliber. Coronary artery  disease is present.     Heart/mediastinum:  Heart is normal in size. There is evidence of right heart strain with  reversal of the intraventricular septum, right ventricle is measuring  approximately 40 mm in diameter left ventricle is measuring  approximately 44 mm in diameter (series 5, image 60). No pericardial  effusion.      Lymph nodes:  No pathologically enlarged thoracic lymph nodes. Right hilar  lymphadenopathy, likely reactive/inflammatory. Partially calcified  subcarinal and left hilar and right hilar lymph nodes.     Chest wall:  No acute findings right chest wall. Port-A-Cath tip in the SVC.     Bones:  No acute fracture. No aggressive osseous sclerotic or lucent lesions.     ABDOMEN/PELVIS:  Liver, gallbladder and bile ducts:  The liver demonstrates innumerable hypodense lesions, compatible with  hepatic steatosis, largest lesion in the right hemiliver measuring up to  5 cm. Unremarkable  gallbladder. No biliary ductal dilatation.      Adrenal glands:  There is indeterminate 13 mm right adrenal nodule. There is  fat-containing 2.3 cm left adrenal nodule, probable adrenal myelolipoma.     Kidneys, ureters and urinary bladder:  No suspicious renal lesions. Right nephrolithiasis with few stones in  the right kidney measuring up to 6 mm. There are few stones in the left  kidney midpole. No hydronephrosis. Unremarkable urinary bladder.     Spleen:  The spleen is normal in size.     Pancreas:  The pancreas is unremarkable.      Gastrointestinal system and mesentery:  There is no evidence of bowel obstruction. The appendix is visualized  and unremarkable. No significant mesenteric inflammation. No discrete  colonic mass is identified. Moderate amount of colonic stool burden.     Lymph nodes:  No pathologically enlarged abdominal or pelvic lymph nodes are present.     Vessels:  The abdominal aorta is normal in caliber. The celiac trunk, superior  mesenteric artery, inferior mesenteric artery and their branch vessels  appear grossly patent. The superior mesenteric vein, splenic vein and  main portal veins are patent. The inferior vena cava is unremarkable.     Peritoneum:  No free intraperitoneal fluid or pneumoperitoneum.     Pelvic viscera:  No acute findings.     Body wall:  No acute findings. No significant body wall hernias.     Bones:  No acute fracture. There is a sclerotic focus in the left iliac bone,  likely a bony island or osseous metastasis. No aggressive osseous  sclerotic or lucent lesions. Moderate multilevel degenerative changes of  the lumbar spine.       Impression:      There are multiple large intraluminal filling defects in the right lower  lobe lobar, segmental and subsegmental pulmonary arteries, consistent  with acute pulmonary embolism. Right lower lobe large area of  wedge-shaped opacity, compatible with pulmonary infarct. There are few  subsegmental pulmonary arterial filling  defects in the left lower lobe,  compatible with acute pulmonary emboli. There is evidence of right heart  strain with reversal of interventricular septum. Correlate clinically.     There is a 5 mm pulmonary nodule in the left lower lobe. Few tiny  pulmonary nodules in the right upper lobe. These nodules are  indeterminate, metastasis cannot be excluded. Correlate clinically and  consider follow-up imaging.     Innumerable hypodense hepatic lesions, compatible with metastases.  Unknown primary.     Indeterminate 13 mm right adrenal nodule, concerning for metastasis.     No definite colonic mass is identified, suspected. Recommended  colonoscopy evaluation if not already performed.     Nonobstructing bilateral nephrolithiasis.           These findings were discussed with JENELLE TORRES PA-C on  8/3/2023 1:19 PM by EDUARDO Grey, over the phone.     Images personally reviewed, interpreted and dictated by EDUARDO Grey.                             This report was signed and finalized on 8/3/2023 1:28 PM by EDUARDO Grey.       CT Angiogram Chest Pulmonary Embolism [[id number]] Collected: 08/03/23 1213     Updated: 08/03/23 1330    Narrative:      CT SCAN OF THE CHEST, ABDOMEN AND PELVIS WITH CONTRAST    8/3/2023 1:17  PM      HISTORY:   right flank pain .     PROCEDURE:   Axial CT images were obtained from the lung apex to the pubic symphysis  following IV contrast administration. Coronal and sagittal reformatted  images were generated from the axial data set and provided for  interpretation. This study was performed with techniques to keep  radiation doses as low as reasonably achievable, (ALARA). Individualized  dose reduction techniques using automated exposure control or adjustment  of mA and/or kV according to the patient size were employed.     COMPARISON:   CT abdomen and pelvis dated 2/4/2016.     FINDINGS:      CHEST:   Lungs/Pleura:  Central airways are patent. Right lower  lobe large peripheral  wedge-shaped opacity, likely a pulmonary infarct. There is a 5 mm  pulmonary nodule in the left lower lobe. There are a few tiny pulmonary  nodules in the anterior right upper lobe. No pneumothorax or pleural  effusion.     Vessels:  There are multiple large intraluminal filling defects within the right  lower lobe lobar, segmental and subsegmental pulmonary arteries,  compatible with acute pulmonary embolism. There are few scattered areas  of left lower lobe subsegmental filling defects, compatible with acute  pulmonary emboli. Thoracic aorta is normal in caliber. Coronary artery  disease is present.     Heart/mediastinum:  Heart is normal in size. There is evidence of right heart strain with  reversal of the intraventricular septum, right ventricle is measuring  approximately 40 mm in diameter left ventricle is measuring  approximately 44 mm in diameter (series 5, image 60). No pericardial  effusion.      Lymph nodes:  No pathologically enlarged thoracic lymph nodes. Right hilar  lymphadenopathy, likely reactive/inflammatory. Partially calcified  subcarinal and left hilar and right hilar lymph nodes.     Chest wall:  No acute findings right chest wall. Port-A-Cath tip in the SVC.     Bones:  No acute fracture. No aggressive osseous sclerotic or lucent lesions.     ABDOMEN/PELVIS:  Liver, gallbladder and bile ducts:  The liver demonstrates innumerable hypodense lesions, compatible with  hepatic steatosis, largest lesion in the right hemiliver measuring up to  5 cm. Unremarkable gallbladder. No biliary ductal dilatation.      Adrenal glands:  There is indeterminate 13 mm right adrenal nodule. There is  fat-containing 2.3 cm left adrenal nodule, probable adrenal myelolipoma.     Kidneys, ureters and urinary bladder:  No suspicious renal lesions. Right nephrolithiasis with few stones in  the right kidney measuring up to 6 mm. There are few stones in the left  kidney midpole. No hydronephrosis.  Unremarkable urinary bladder.     Spleen:  The spleen is normal in size.     Pancreas:  The pancreas is unremarkable.      Gastrointestinal system and mesentery:  There is no evidence of bowel obstruction. The appendix is visualized  and unremarkable. No significant mesenteric inflammation. No discrete  colonic mass is identified. Moderate amount of colonic stool burden.     Lymph nodes:  No pathologically enlarged abdominal or pelvic lymph nodes are present.     Vessels:  The abdominal aorta is normal in caliber. The celiac trunk, superior  mesenteric artery, inferior mesenteric artery and their branch vessels  appear grossly patent. The superior mesenteric vein, splenic vein and  main portal veins are patent. The inferior vena cava is unremarkable.     Peritoneum:  No free intraperitoneal fluid or pneumoperitoneum.     Pelvic viscera:  No acute findings.     Body wall:  No acute findings. No significant body wall hernias.     Bones:  No acute fracture. There is a sclerotic focus in the left iliac bone,  likely a bony island or osseous metastasis. No aggressive osseous  sclerotic or lucent lesions. Moderate multilevel degenerative changes of  the lumbar spine.       Impression:      There are multiple large intraluminal filling defects in the right lower  lobe lobar, segmental and subsegmental pulmonary arteries, consistent  with acute pulmonary embolism. Right lower lobe large area of  wedge-shaped opacity, compatible with pulmonary infarct. There are few  subsegmental pulmonary arterial filling defects in the left lower lobe,  compatible with acute pulmonary emboli. There is evidence of right heart  strain with reversal of interventricular septum. Correlate clinically.     There is a 5 mm pulmonary nodule in the left lower lobe. Few tiny  pulmonary nodules in the right upper lobe. These nodules are  indeterminate, metastasis cannot be excluded. Correlate clinically and  consider follow-up imaging.     Innumerable  hypodense hepatic lesions, compatible with metastases.  Unknown primary.     Indeterminate 13 mm right adrenal nodule, concerning for metastasis.     No definite colonic mass is identified, suspected. Recommended  colonoscopy evaluation if not already performed.     Nonobstructing bilateral nephrolithiasis.           These findings were discussed with JENELLE TORRES PA-C on  8/3/2023 1:19 PM by EDUARDO Grey, over the phone.     Images personally reviewed, interpreted and dictated by EDUARDO Grey.                             This report was signed and finalized on 8/3/2023 1:28 PM by EDUARDO Grey.                 ECHO:   None available for review.        Assessment/plan:  1.  Bilateral pulmonary emboli with right lung infarct  Clinically doing well and has required minimal oxygen supplementation to maintain sats.  Currently on 2 L nasal cannula while was in the room  They have ordered heparin and this is currently being started.  If patient continues to do well clinically, can transition to Eliquis and I would expect will need at least 6 months worth of therapy, but given malignancy will most likely need to be kept on indefinitely.  Discussed risk, benefits and possible complications of treatment including risk of bleeding with patient.  We will need to continue with pain control for right lung infarct and I explained that I expect this to clinically improve over the next few weeks.  Troponin and BNP both normal and no indication of right heart strain based on the normal labs.    2.  Stage IV colon cancer  Currently undergoing palliative treatment  Has follow-up appointment with oncologist next Tuesday for regularly scheduled visit    No family at bedside.  Will order DSM follow-up in 1-2 weeks.     Recommendations were also discussed with the referring provider, Michelle LOZANO.     I would like to thank you for the opportunity to participate in the care of this patient.   We will communicate changes and recommendations, if and when necessary.      This document was electronically signed by Lou Luo MD on 08/03/23 at 14:02 EDT      Dictated utilizing Dragon dictation.

## 2023-08-03 NOTE — ED PROVIDER NOTES
Subjective  History of Present Illness:    Chief Complaint:   Chief Complaint   Patient presents with    Abdominal Pain      History of Present Illness: Ladarius Jones is a 52 y.o. male who presents to the emergency department complaining of right upper quadrant/right flank/right costal margin pain and some shortness of breath.  Patient has a history of colon cancer stage IV with mets to the liver on chemotherapy.  Gets biweekly to daily chemo treatments through port in his right chest.  States about a week ago noticed his symptoms.  No significant cough, no dysuria.  Follows with [facility] at .  Onset: 1 week ago  Duration: Ongoing  Exacerbating / Alleviating factors: Worse with activity  Associated symptoms: None      Nurses Notes reviewed and agree, including vitals, allergies, social history and prior medical history.     Review of Systems   Constitutional: Negative.    HENT: Negative.     Eyes: Negative.    Respiratory:  Positive for shortness of breath.    Cardiovascular: Negative.    Gastrointestinal:         Right flank pain   Genitourinary: Negative.    Musculoskeletal: Negative.    Skin: Negative.    Allergic/Immunologic: Negative.    Neurological: Negative.    Psychiatric/Behavioral: Negative.     All other systems reviewed and are negative.    Past Medical History:   Diagnosis Date    Allergic rhinitis     Anemia     Benign prostate hyperplasia     Depression     Diabetes mellitus     Hypertension     Hyperuricemia     Osteoarthritis     Sleep apnea        Allergies:    Patient has no known allergies.      Past Surgical History:   Procedure Laterality Date    COLONOSCOPY N/A 5/8/2023    Procedure: COLONOSCOPY WITH POLYPECTOMY AND BIOPSY AND TATTOO;  Surgeon: Eileen Boggs MD;  Location: [facility] ENDOSCOPY;  Service: Gastroenterology;  Laterality: N/A;    ENDOSCOPY N/A 5/8/2023    Procedure: ESOPHAGOGASTRODUODENOSCOPY WITH BIOPSY;  Surgeon: Eileen Boggs MD;  Location: [facility]  "ENDOSCOPY;  Service: Gastroenterology;  Laterality: N/A;    KNEE SURGERY Left     ROTATOR CUFF REPAIR Left     WRIST SURGERY Right          Social History     Socioeconomic History    Marital status:    Tobacco Use    Smoking status: Never    Smokeless tobacco: Never   Vaping Use    Vaping Use: Never used   Substance and Sexual Activity    Alcohol use: Yes     Comment: occasional    Drug use: No    Sexual activity: Defer         Family History   Problem Relation Age of Onset    Asthma Mother     Diabetes Mother     Asthma Father     Diabetes Father     Heart disease Father     Hypertension Father     Stroke Father     Colon cancer Neg Hx        Objective  Physical Exam:  /82   Pulse 100   Temp 98.1 øF (36.7 øC) (Oral)   Resp 17   Ht 175.3 cm (69\")   Wt 122 kg (270 lb)   SpO2 97%   BMI 39.87 kg/mý      Physical Exam  Vitals and nursing note reviewed.   Constitutional:       General: He is not in acute distress.     Appearance: He is well-developed. He is obese. He is not ill-appearing, toxic-appearing or diaphoretic.   HENT:      Head: Normocephalic and atraumatic.   Eyes:      Extraocular Movements: Extraocular movements intact.   Cardiovascular:      Rate and Rhythm: Normal rate and regular rhythm.   Pulmonary:      Effort: Pulmonary effort is normal.      Breath sounds: Normal breath sounds.   Abdominal:      General: Abdomen is flat. Bowel sounds are normal.      Palpations: Abdomen is soft.      Tenderness:  in the right upper quadrant   Skin:     General: Skin is warm and dry.   Neurological:      General: No focal deficit present.      Mental Status: He is alert.   Psychiatric:         Mood and Affect: Mood normal.         Behavior: Behavior normal.         Procedures    ED Course:    ED Course as of 08/03/23 1355   Thu Aug 03, 2023   1344 WBC, UA(!): 13-20 [TM]   1344 Bacteria, UA(!): 2+ [TM]   1344 HS Troponin T: 8 [TM]   1346 proBNP: <36.0 [TM]   1346 HS Troponin T: 8 [TM]      ED Course " User Index  [TM] Tomas Castellano PA-C       Lab Results (last 24 hours)       Procedure Component Value Units Date/Time    CBC & Differential [[id number]]  (Abnormal) Collected: 08/03/23 1122    Specimen: Blood Updated: 08/03/23 1154    Narrative:      The following orders were created for panel order CBC & Differential.  Procedure                               Abnormality         Status                     ---------                               -----------         ------                     CBC Auto Differential[[id number]]        Abnormal            Final result               Scan Slide[[id number]]                                       Final result                 Please view results for these tests on the individual orders.    Comprehensive Metabolic Panel [[id number]]  (Abnormal) Collected: 08/03/23 1122    Specimen: Blood Updated: 08/03/23 1146     Glucose 98 mg/dL      BUN 16 mg/dL      Creatinine 0.99 mg/dL      Sodium 135 mmol/L      Potassium 4.5 mmol/L      Chloride 101 mmol/L      CO2 22.6 mmol/L      Calcium 8.9 mg/dL      Total Protein 6.9 g/dL      Albumin 3.6 g/dL      ALT (SGPT) 27 U/L      AST (SGOT) 29 U/L      Alkaline Phosphatase 122 U/L      Total Bilirubin 0.6 mg/dL      Globulin 3.3 gm/dL      A/G Ratio 1.1 g/dL      BUN/Creatinine Ratio 16.2     Anion Gap 11.4 mmol/L      eGFR 91.1 mL/min/1.73     Narrative:      GFR Normal >60  Chronic Kidney Disease <60  Kidney Failure <15      Lipase [[id number]]  (Abnormal) Collected: 08/03/23 1122    Specimen: Blood Updated: 08/03/23 1146     Lipase 99 U/L     Lactic Acid, Plasma [[id number]]  (Normal) Collected: 08/03/23 1122    Specimen: Blood Updated: 08/03/23 1144     Lactate 1.5 mmol/L     CBC Auto Differential [[id number]]  (Abnormal) Collected: 08/03/23 1122    Specimen: Blood Updated: 08/03/23 1136     WBC 9.83 10*3/mm3      RBC 4.70 10*6/mm3      Hemoglobin 13.0 g/dL      Hematocrit 39.3 %      MCV 83.6 fL      MCH 27.7 pg      MCHC 33.1  g/dL      RDW 21.3 %      RDW-SD 62.2 fl      MPV 10.2 fL      Platelets 174 10*3/mm3      Neutrophil % 66.4 %      Lymphocyte % 20.7 %      Monocyte % 10.5 %      Eosinophil % 1.4 %      Basophil % 0.4 %      Immature Grans % 0.6 %      Neutrophils, Absolute 6.53 10*3/mm3      Lymphocytes, Absolute 2.03 10*3/mm3      Monocytes, Absolute 1.03 10*3/mm3      Eosinophils, Absolute 0.14 10*3/mm3      Basophils, Absolute 0.04 10*3/mm3      Immature Grans, Absolute 0.06 10*3/mm3      nRBC 0.0 /100 WBC     Scan Slide [[id number]] Collected: 08/03/23 1122    Specimen: Blood Updated: 08/03/23 1154     Anisocytosis Slight/1+     WBC Morphology Normal     Platelet Morphology Normal    BNP [[id number]]  (Normal) Collected: 08/03/23 1122    Specimen: Blood Updated: 08/03/23 1346     proBNP <36.0 pg/mL     Narrative:      Among patients with dyspnea, NT-proBNP is highly sensitive for the detection of acute congestive heart failure. In addition NT-proBNP of <300 pg/ml effectively rules out acute congestive heart failure with 99% negative predictive value.      Single High Sensitivity Troponin T [[id number]]  (Normal) Collected: 08/03/23 1122    Specimen: Blood Updated: 08/03/23 1343     HS Troponin T 8 ng/L     Narrative:      High Sensitive Troponin T Reference Range:  <10.0 ng/L- Negative Female for AMI  <15.0 ng/L- Negative Male for AMI  >=10 - Abnormal Female indicating possible myocardial injury.  >=15 - Abnormal Male indicating possible myocardial injury.   Clinicians would have to utilize clinical acumen, EKG, Troponin, and serial changes to determine if it is an Acute Myocardial Infarction or myocardial injury due to an underlying chronic condition.         Heparin Anti-Xa [[id number]] Collected: 08/03/23 1122    Specimen: Blood Updated: 08/03/23 1349    Protime-INR [[id number]]  (Normal) Collected: 08/03/23 1122    Specimen: Blood Updated: 08/03/23 1339     Protime 14.1 Seconds      INR 1.04    Narrative:      Suggested INR  therapeutic range for stable oral anticoagulant therapy:    Low Intensity therapy:   1.5-2.0  Moderate Intensity therapy:   2.0-3.0  High Intensity therapy:   2.5-4.0    aPTT [[id number]]  (Abnormal) Collected: 08/03/23 1122    Specimen: Blood Updated: 08/03/23 1339     PTT 32.8 seconds     Urinalysis With Microscopic If Indicated (No Culture) - Urine, Clean Catch [[id number]]  (Abnormal) Collected: 08/03/23 1123    Specimen: Urine, Clean Catch Updated: 08/03/23 1136     Color, UA Yellow     Appearance, UA Clear     pH, UA 6.0     Specific Gravity, UA 1.016     Glucose, UA Negative     Ketones, UA Negative     Bilirubin, UA Negative     Blood, UA Negative     Protein, UA Negative     Leuk Esterase, UA Moderate (2+)     Nitrite, UA Negative     Urobilinogen, UA 0.2 E.U./dL    Urinalysis, Microscopic Only - Urine, Clean Catch [[id number]]  (Abnormal) Collected: 08/03/23 1123    Specimen: Urine, Clean Catch Updated: 08/03/23 1142     RBC, UA None Seen /HPF      WBC, UA 13-20 /HPF      Bacteria, UA 2+ /HPF      Squamous Epithelial Cells, UA None Seen /HPF      Hyaline Casts, UA None Seen /LPF      Methodology Manual Light Microscopy             CT Abdomen Pelvis With Contrast    Result Date: 8/3/2023  CT SCAN OF THE CHEST, ABDOMEN AND PELVIS WITH CONTRAST    8/3/2023 1:17 PM  HISTORY: right flank pain .  PROCEDURE: Axial CT images were obtained from the lung apex to the pubic symphysis following IV contrast administration. Coronal and sagittal reformatted images were generated from the axial data set and provided for interpretation. This study was performed with techniques to keep radiation doses as low as reasonably achievable, (ALARA). Individualized dose reduction techniques using automated exposure control or adjustment of mA and/or kV according to the patient size were employed.  COMPARISON: CT abdomen and pelvis dated 2/4/2016.  FINDINGS:  CHEST: Lungs/Pleura: Central airways are patent. Right lower lobe large  peripheral wedge-shaped opacity, likely a pulmonary infarct. There is a 5 mm pulmonary nodule in the left lower lobe. There are a few tiny pulmonary nodules in the anterior right upper lobe. No pneumothorax or pleural effusion.  Vessels: There are multiple large intraluminal filling defects within the right lower lobe lobar, segmental and subsegmental pulmonary arteries, compatible with acute pulmonary embolism. There are few scattered areas of left lower lobe subsegmental filling defects, compatible with acute pulmonary emboli. Thoracic aorta is normal in caliber. Coronary artery disease is present.  Heart/mediastinum: Heart is normal in size. There is evidence of right heart strain with reversal of the intraventricular septum, right ventricle is measuring approximately 40 mm in diameter left ventricle is measuring approximately 44 mm in diameter (series 5, image 60). No pericardial effusion.  Lymph nodes: No pathologically enlarged thoracic lymph nodes. Right hilar lymphadenopathy, likely reactive/inflammatory. Partially calcified subcarinal and left hilar and right hilar lymph nodes.  Chest wall: No acute findings right chest wall. Port-A-Cath tip in the SVC.  Bones: No acute fracture. No aggressive osseous sclerotic or lucent lesions.  ABDOMEN/PELVIS: Liver, gallbladder and bile ducts: The liver demonstrates innumerable hypodense lesions, compatible with hepatic steatosis, largest lesion in the right hemiliver measuring up to 5 cm. Unremarkable gallbladder. No biliary ductal dilatation.  Adrenal glands: There is indeterminate 13 mm right adrenal nodule. There is fat-containing 2.3 cm left adrenal nodule, probable adrenal myelolipoma.  Kidneys, ureters and urinary bladder: No suspicious renal lesions. Right nephrolithiasis with few stones in the right kidney measuring up to 6 mm. There are few stones in the left kidney midpole. No hydronephrosis. Unremarkable urinary bladder.  Spleen: The spleen is normal in  size.  Pancreas: The pancreas is unremarkable.  Gastrointestinal system and mesentery: There is no evidence of bowel obstruction. The appendix is visualized and unremarkable. No significant mesenteric inflammation. No discrete colonic mass is identified. Moderate amount of colonic stool burden.  Lymph nodes: No pathologically enlarged abdominal or pelvic lymph nodes are present.  Vessels: The abdominal aorta is normal in caliber. The celiac trunk, superior mesenteric artery, inferior mesenteric artery and their branch vessels appear grossly patent. The superior mesenteric vein, splenic vein and main portal veins are patent. The inferior vena cava is unremarkable.  Peritoneum: No free intraperitoneal fluid or pneumoperitoneum.  Pelvic viscera: No acute findings.  Body wall: No acute findings. No significant body wall hernias.  Bones: No acute fracture. There is a sclerotic focus in the left iliac bone, likely a bony island or osseous metastasis. No aggressive osseous sclerotic or lucent lesions. Moderate multilevel degenerative changes of the lumbar spine.      Impression: There are multiple large intraluminal filling defects in the right lower lobe lobar, segmental and subsegmental pulmonary arteries, consistent with acute pulmonary embolism. Right lower lobe large area of wedge-shaped opacity, compatible with pulmonary infarct. There are few subsegmental pulmonary arterial filling defects in the left lower lobe, compatible with acute pulmonary emboli. There is evidence of right heart strain with reversal of interventricular septum. Correlate clinically.  There is a 5 mm pulmonary nodule in the left lower lobe. Few tiny pulmonary nodules in the right upper lobe. These nodules are indeterminate, metastasis cannot be excluded. Correlate clinically and consider follow-up imaging.  Innumerable hypodense hepatic lesions, compatible with metastases. Unknown primary.  Indeterminate 13 mm right adrenal nodule, concerning  for metastasis.  No definite colonic mass is identified, suspected. Recommended colonoscopy evaluation if not already performed.  Nonobstructing bilateral nephrolithiasis.    These findings were discussed with JENELLE TORRES PA-C on 8/3/2023 1:19 PM by EDUARDO Grey, over the phone.  Images personally reviewed, interpreted and dictated by EDUARDO Grey.          This report was signed and finalized on 8/3/2023 1:28 PM by EDUARDO Grey.      CT Angiogram Chest Pulmonary Embolism    Result Date: 8/3/2023  CT SCAN OF THE CHEST, ABDOMEN AND PELVIS WITH CONTRAST    8/3/2023 1:17 PM  HISTORY: right flank pain .  PROCEDURE: Axial CT images were obtained from the lung apex to the pubic symphysis following IV contrast administration. Coronal and sagittal reformatted images were generated from the axial data set and provided for interpretation. This study was performed with techniques to keep radiation doses as low as reasonably achievable, (ALARA). Individualized dose reduction techniques using automated exposure control or adjustment of mA and/or kV according to the patient size were employed.  COMPARISON: CT abdomen and pelvis dated 2/4/2016.  FINDINGS:  CHEST: Lungs/Pleura: Central airways are patent. Right lower lobe large peripheral wedge-shaped opacity, likely a pulmonary infarct. There is a 5 mm pulmonary nodule in the left lower lobe. There are a few tiny pulmonary nodules in the anterior right upper lobe. No pneumothorax or pleural effusion.  Vessels: There are multiple large intraluminal filling defects within the right lower lobe lobar, segmental and subsegmental pulmonary arteries, compatible with acute pulmonary embolism. There are few scattered areas of left lower lobe subsegmental filling defects, compatible with acute pulmonary emboli. Thoracic aorta is normal in caliber. Coronary artery disease is present.  Heart/mediastinum: Heart is normal in size. There is evidence of  right heart strain with reversal of the intraventricular septum, right ventricle is measuring approximately 40 mm in diameter left ventricle is measuring approximately 44 mm in diameter (series 5, image 60). No pericardial effusion.  Lymph nodes: No pathologically enlarged thoracic lymph nodes. Right hilar lymphadenopathy, likely reactive/inflammatory. Partially calcified subcarinal and left hilar and right hilar lymph nodes.  Chest wall: No acute findings right chest wall. Port-A-Cath tip in the SVC.  Bones: No acute fracture. No aggressive osseous sclerotic or lucent lesions.  ABDOMEN/PELVIS: Liver, gallbladder and bile ducts: The liver demonstrates innumerable hypodense lesions, compatible with hepatic steatosis, largest lesion in the right hemiliver measuring up to 5 cm. Unremarkable gallbladder. No biliary ductal dilatation.  Adrenal glands: There is indeterminate 13 mm right adrenal nodule. There is fat-containing 2.3 cm left adrenal nodule, probable adrenal myelolipoma.  Kidneys, ureters and urinary bladder: No suspicious renal lesions. Right nephrolithiasis with few stones in the right kidney measuring up to 6 mm. There are few stones in the left kidney midpole. No hydronephrosis. Unremarkable urinary bladder.  Spleen: The spleen is normal in size.  Pancreas: The pancreas is unremarkable.  Gastrointestinal system and mesentery: There is no evidence of bowel obstruction. The appendix is visualized and unremarkable. No significant mesenteric inflammation. No discrete colonic mass is identified. Moderate amount of colonic stool burden.  Lymph nodes: No pathologically enlarged abdominal or pelvic lymph nodes are present.  Vessels: The abdominal aorta is normal in caliber. The celiac trunk, superior mesenteric artery, inferior mesenteric artery and their branch vessels appear grossly patent. The superior mesenteric vein, splenic vein and main portal veins are patent. The inferior vena cava is unremarkable.   Peritoneum: No free intraperitoneal fluid or pneumoperitoneum.  Pelvic viscera: No acute findings.  Body wall: No acute findings. No significant body wall hernias.  Bones: No acute fracture. There is a sclerotic focus in the left iliac bone, likely a bony island or osseous metastasis. No aggressive osseous sclerotic or lucent lesions. Moderate multilevel degenerative changes of the lumbar spine.      Impression: There are multiple large intraluminal filling defects in the right lower lobe lobar, segmental and subsegmental pulmonary arteries, consistent with acute pulmonary embolism. Right lower lobe large area of wedge-shaped opacity, compatible with pulmonary infarct. There are few subsegmental pulmonary arterial filling defects in the left lower lobe, compatible with acute pulmonary emboli. There is evidence of right heart strain with reversal of interventricular septum. Correlate clinically.  There is a 5 mm pulmonary nodule in the left lower lobe. Few tiny pulmonary nodules in the right upper lobe. These nodules are indeterminate, metastasis cannot be excluded. Correlate clinically and consider follow-up imaging.  Innumerable hypodense hepatic lesions, compatible with metastases. Unknown primary.  Indeterminate 13 mm right adrenal nodule, concerning for metastasis.  No definite colonic mass is identified, suspected. Recommended colonoscopy evaluation if not already performed.  Nonobstructing bilateral nephrolithiasis.    These findings were discussed with JENELLE TORRES PA-C on 8/3/2023 1:19 PM by EDUARDO Grey, over the phone.  Images personally reviewed, interpreted and dictated by EDUARDO Grey.          This report was signed and finalized on 8/3/2023 1:28 PM by EDUARDO Grey.          Medical Decision Making  Amount and/or Complexity of Data Reviewed  Labs: ordered. Decision-making details documented in ED Course.  Radiology: ordered.    Risk  OTC drugs.  Prescription drug  management.        Ladarius Jones is a 52 y.o. male who presents to the emergency department for evaluation of right upper quadrant/right flank/right lung pain with some shortness of breath    Differential diagnosis includes PE, cholecystitis, ureteral stone, UTI, pyelonephritis among other etiologies.    CBC, CMP, troponin, BNP, ordered for further evaluation of the patient's presentation.    Chart review if available included outside testing, previous visits, prior labs, prior imaging, available notes from prior evaluations or visits with specialists, medication list, allergies, past medical history, past surgical history when applicable.    Patient was treated with heparin bolus and drip    Patient has a right lower lobe pulmonary infarct with PE, more tachypneic and short of breath with any exertion here.  Given cancer history will place on heparin.  Dr. Vásquez, graciously except the patient for admission    Plan for disposition is admission to the hospital.  Patient/family comfortable with and understanding of the plan.      Final diagnoses:   Acute pulmonary embolism with acute cor pulmonale, unspecified pulmonary embolism type   Pulmonary infarct          Tomas Castellano PA-C  08/03/23 4338

## 2023-08-03 NOTE — H&P
[facility]   HISTORY AND PHYSICAL      Name:  Ladarius Jones   Age:  52 y.o.  Sex:  male  :  1970  MRN:  [id number]   Visit Number:  [id number]  Admission Date:  8/3/2023  Date Of Service:  23  Primary Care Physician:  Maame Higgins DO    Chief Complaint:     Chest/Abdominal pain, pulmonary embolus    History Of Presenting Illness:      This is a pleasant 52-year-old male patient who presents to the hospital today with complaints of right upper quadrant/right lower lateral chest discomfort for the last several days.  Patient states that he is also noticed some shortness of breath, mostly with exertion and when he is experiencing the pain in his right side.  He has a history of colon cancer stage IV with mets to the liver, diabetes mellitus type 2 that is currently untreated, hypertension to which she is taking spironolactone, and sleep apnea and wears a CPAP at night.  He is currently receiving infusion chemotherapy every 2 weeks at [facility].  He is not currently receiving radiation treatments.  No surgical intervention at this time.  He states for the last several days he has noticed progressively worsening right-sided pain.  He denies any cough, more specifically any productive cough or hemoptysis.  Denies any recent travel.  Denies any unilateral leg swelling or pain.  Denies any previous episodes of PE or DVT.  He does report that he receives an injectable anticoagulant with each chemotherapy treatment he has received.  He denies any fever or chills.  Denies any nausea or vomiting.  Denies any diarrhea.  Pain is worse with activity and movement.    He presented to the emergency department today complaining of right upper quadrant abdominal pain and some shortness of breath.  An EKG was performed which revealed sinus rhythm with a rate of 88 bpm.  No acute ST changes were noted.  CBC was unremarkable.  CMP was noted for sodium of 135 but otherwise  unremarkable.  Lipase was 99.  BNP normal.  Normal cardiac enzyme.  CT of the abdomen and pelvis with contrast and CAT scan of the chest reveal multiple large bilateral acute pulmonary embolisms.  There is additionally an area of a wedge-shaped opacity that is compatible with pulmonary infarct on the right side.  There is evidence of right heart strain.  A notable 5 mm pulmonary nodule in the left lower lobe and a few tiny pulmonary nodules in the right upper lobe.  These are indeterminate and metastasis could not be excluded based upon CT read.  There is also an indeterminate 13 mm right adrenal nodule which is concerning for metastasis.  There is no obvious colonic mass that is identified.  It is recommended for the patient to have a colonoscopy.  The patient was initiated on a heparin drip with bolus.  This will be managed by pharmacy.  The hospitalist was consulted for admission for further management of care.  Additionally, pulmonology was consulted and has agreed to see the patient for further recommendations.  He is currently on room air upon my examination and maintaining oxygen saturation greater than 95% on room air.    Review Of Systems:    All systems were reviewed and negative except as mentioned in history of presenting illness, assessment and plan.    Past Medical History: Patient  has a past medical history of Allergic rhinitis, Anemia, Benign prostate hyperplasia, Depression, Diabetes mellitus, Hypertension, Hyperuricemia, Osteoarthritis, and Sleep apnea.    Past Surgical History: Patient  has a past surgical history that includes Rotator cuff repair (Left); Wrist surgery (Right); Knee surgery (Left); Esophagogastroduodenoscopy (N/A, 5/8/2023); and Colonoscopy (N/A, 5/8/2023).    Social History: Patient  reports that he has never smoked. He has never used smokeless tobacco. He reports current alcohol use. He reports that he does not use drugs.    Family History:  Patient's family history has been  reviewed and found to be noncontributory.     Allergies:      Patient has no known allergies.    Home Medications:    Prior to Admission Medications       Prescriptions Last Dose Informant Patient Reported? Taking?    allopurinol (ZYLOPRIM) 300 MG tablet   Yes No    Take 1 tablet by mouth Daily.    azelastine (ASTELIN) 0.1 % nasal spray   Yes No    One spray each nostril at night as needed    buPROPion XL (WELLBUTRIN XL) 300 MG 24 hr tablet   Yes No    Take 1 tablet by mouth Daily.    ferrous sulfate 325 (65 FE) MG tablet   Yes No    Take 1 tablet by mouth Daily With Breakfast.    fluticasone (FLONASE) 50 MCG/ACT nasal spray   Yes No    INHALE 2 SPRAYS BY INTRANASAL ROUTE EVERY DAY IN EACH NOSTRIL    Hydrocortisone, Perianal, (ANUSOL-HC) 2.5 % rectal cream   No No    Insert  into the rectum 2 (Two) Times a Day As Needed for Hemorrhoids.    levothyroxine (SYNTHROID, LEVOTHROID) 50 MCG tablet   Yes No    Take 1 tablet by mouth Every Morning.    lisinopril-hydrochlorothiazide (PRINZIDE,ZESTORETIC) 20-25 MG per tablet   Yes No    TAKE 1 TABLET BY ORAL ROUTE EVERY DAY    metFORMIN ER (GLUCOPHAGE-XR) 500 MG 24 hr tablet   Yes No    Take 1 tablet by mouth Daily With Breakfast.    Patient not taking:  Reported on 7/6/2023    ondansetron (ZOFRAN) 4 MG tablet   Yes No    Take 1 tablet by mouth As Needed.    pantoprazole (PROTONIX) 40 MG EC tablet   No No    Take 1 tablet by mouth Daily.    spironolactone (ALDACTONE) 100 MG tablet   Yes No    Take 1 tablet by mouth Daily.    tamsulosin (FLOMAX) 0.4 MG capsule 24 hr capsule   Yes No    Take 1 capsule by mouth Daily. 2 CAPSULES BY MOUTH DAILY          ED Medications:    Medications   sodium chloride 0.9 % flush 10 mL (has no administration in time range)   heparin 23398 units/250 ml (100 units/ml) in D5W (12.3 Units/kg/hr x 122 kg Intravenous Currently Infusing 8/3/23 1500)   Pharmacy to Dose Heparin (has no administration in time range)   allopurinol (ZYLOPRIM) tablet 300 mg  (has no administration in time range)   azelastine (ASTELIN) nasal spray 1 spray (has no administration in time range)   buPROPion XL (WELLBUTRIN XL) 24 hr tablet 300 mg (has no administration in time range)   fluticasone (FLONASE) 50 MCG/ACT nasal spray 1 spray (has no administration in time range)   levothyroxine (SYNTHROID, LEVOTHROID) tablet 50 mcg (has no administration in time range)   lisinopril (PRINIVIL,ZESTRIL) tablet 20 mg (has no administration in time range)     And   hydroCHLOROthiazide (HYDRODIURIL) tablet 25 mg (has no administration in time range)   pantoprazole (PROTONIX) EC tablet 40 mg (has no administration in time range)   spironolactone (ALDACTONE) tablet 100 mg (has no administration in time range)   tamsulosin (FLOMAX) 24 hr capsule 0.4 mg (has no administration in time range)   sodium chloride 0.9 % flush 10 mL (has no administration in time range)   sodium chloride 0.9 % flush 10 mL (has no administration in time range)   sodium chloride 0.9 % infusion 40 mL (has no administration in time range)   sennosides-docusate (PERICOLACE) 8.6-50 MG per tablet 2 tablet (has no administration in time range)     And   polyethylene glycol (MIRALAX) packet 17 g (has no administration in time range)     And   bisacodyl (DULCOLAX) EC tablet 5 mg (has no administration in time range)     And   bisacodyl (DULCOLAX) suppository 10 mg (has no administration in time range)   nitroglycerin (NITROSTAT) SL tablet 0.4 mg (has no administration in time range)   Potassium Replacement - Follow Nurse / BPA Driven Protocol (has no administration in time range)   Magnesium Standard Dose Replacement - Follow Nurse / BPA Driven Protocol (has no administration in time range)   Phosphorus Replacement - Follow Nurse / BPA Driven Protocol (has no administration in time range)   Calcium Replacement - Follow Nurse / BPA Driven Protocol (has no administration in time range)   morphine injection 4 mg (has no administration in  "time range)     And   naloxone (NARCAN) injection 0.4 mg (has no administration in time range)   oxyCODONE-acetaminophen (PERCOCET) 5-325 MG per tablet 1 tablet (has no administration in time range)   LORazepam (ATIVAN) tablet 0.5 mg (has no administration in time range)   melatonin tablet 5 mg (has no administration in time range)   ondansetron (ZOFRAN) tablet 4 mg (has no administration in time range)     Or   ondansetron (ZOFRAN) injection 4 mg (has no administration in time range)   iopamidol (ISOVUE-300) 61 % injection 100 mL (100 mL Intravenous Given 8/3/23 1234)   heparin (porcine) injection 9,760 Units (9,760 Units Intravenous Given 8/3/23 1423)     Vital Signs:  Temp:  [98.1 øF (36.7 øC)] 98.1 øF (36.7 øC)  Heart Rate:  [] 97  Resp:  [17-18] 18  BP: (113-146)/(63-82) 146/80        08/03/23  1034 08/03/23  1456   Weight: 122 kg (270 lb) 131 kg (289 lb 12.8 oz)     Body mass index is 42.8 kg/mý.    Physical Exam:     Most recent vital Signs: /80 (BP Location: Right arm, Patient Position: Lying)   Pulse 97   Temp 98.1 øF (36.7 øC) (Oral)   Resp 18   Ht 175.3 cm (69\")   Wt 131 kg (289 lb 12.8 oz)   SpO2 96%   BMI 42.80 kg/mý     Physical Exam    Laboratory data:    I have reviewed the labs done in the emergency room.    Results from last 7 days   Lab Units 08/03/23  1122   SODIUM mmol/L 135*   POTASSIUM mmol/L 4.5   CHLORIDE mmol/L 101   CO2 mmol/L 22.6   BUN mg/dL 16   CREATININE mg/dL 0.99   CALCIUM mg/dL 8.9   BILIRUBIN mg/dL 0.6   ALK PHOS U/L 122*   ALT (SGPT) U/L 27   AST (SGOT) U/L 29   GLUCOSE mg/dL 98     Results from last 7 days   Lab Units 08/03/23  1122   WBC 10*3/mm3 9.83   HEMOGLOBIN g/dL 13.0   HEMATOCRIT % 39.3   PLATELETS 10*3/mm3 174     Results from last 7 days   Lab Units 08/03/23  1122   INR  1.04     Results from last 7 days   Lab Units 08/03/23  1122   HSTROP T ng/L 8     Results from last 7 days   Lab Units 08/03/23  1122   PROBNP pg/mL <36.0         Results from last " 7 days   Lab Units 08/03/23  1122   LIPASE U/L 99*         Results from last 7 days   Lab Units 08/03/23  1123   COLOR UA  Yellow   GLUCOSE UA  Negative   KETONES UA  Negative   LEUKOCYTES UA  Moderate (2+)*   PH, URINE  6.0   BILIRUBIN UA  Negative   UROBILINOGEN UA  0.2 E.U./dL   RBC UA /HPF None Seen   WBC UA /HPF 13-20*       Pain Management Panel           No data to display                EKG:      Reviewed by me.  Normal sinus rhythm.  Heart rate 88.  No acute ST changes.    Radiology:    CT Abdomen Pelvis With Contrast    Result Date: 8/3/2023  CT SCAN OF THE CHEST, ABDOMEN AND PELVIS WITH CONTRAST    8/3/2023 1:17 PM  HISTORY: right flank pain .  PROCEDURE: Axial CT images were obtained from the lung apex to the pubic symphysis following IV contrast administration. Coronal and sagittal reformatted images were generated from the axial data set and provided for interpretation. This study was performed with techniques to keep radiation doses as low as reasonably achievable, (ALARA). Individualized dose reduction techniques using automated exposure control or adjustment of mA and/or kV according to the patient size were employed.  COMPARISON: CT abdomen and pelvis dated 2/4/2016.  FINDINGS:  CHEST: Lungs/Pleura: Central airways are patent. Right lower lobe large peripheral wedge-shaped opacity, likely a pulmonary infarct. There is a 5 mm pulmonary nodule in the left lower lobe. There are a few tiny pulmonary nodules in the anterior right upper lobe. No pneumothorax or pleural effusion.  Vessels: There are multiple large intraluminal filling defects within the right lower lobe lobar, segmental and subsegmental pulmonary arteries, compatible with acute pulmonary embolism. There are few scattered areas of left lower lobe subsegmental filling defects, compatible with acute pulmonary emboli. Thoracic aorta is normal in caliber. Coronary artery disease is present.  Heart/mediastinum: Heart is normal in size. There is  evidence of right heart strain with reversal of the intraventricular septum, right ventricle is measuring approximately 40 mm in diameter left ventricle is measuring approximately 44 mm in diameter (series 5, image 60). No pericardial effusion.  Lymph nodes: No pathologically enlarged thoracic lymph nodes. Right hilar lymphadenopathy, likely reactive/inflammatory. Partially calcified subcarinal and left hilar and right hilar lymph nodes.  Chest wall: No acute findings right chest wall. Port-A-Cath tip in the SVC.  Bones: No acute fracture. No aggressive osseous sclerotic or lucent lesions.  ABDOMEN/PELVIS: Liver, gallbladder and bile ducts: The liver demonstrates innumerable hypodense lesions, compatible with hepatic steatosis, largest lesion in the right hemiliver measuring up to 5 cm. Unremarkable gallbladder. No biliary ductal dilatation.  Adrenal glands: There is indeterminate 13 mm right adrenal nodule. There is fat-containing 2.3 cm left adrenal nodule, probable adrenal myelolipoma.  Kidneys, ureters and urinary bladder: No suspicious renal lesions. Right nephrolithiasis with few stones in the right kidney measuring up to 6 mm. There are few stones in the left kidney midpole. No hydronephrosis. Unremarkable urinary bladder.  Spleen: The spleen is normal in size.  Pancreas: The pancreas is unremarkable.  Gastrointestinal system and mesentery: There is no evidence of bowel obstruction. The appendix is visualized and unremarkable. No significant mesenteric inflammation. No discrete colonic mass is identified. Moderate amount of colonic stool burden.  Lymph nodes: No pathologically enlarged abdominal or pelvic lymph nodes are present.  Vessels: The abdominal aorta is normal in caliber. The celiac trunk, superior mesenteric artery, inferior mesenteric artery and their branch vessels appear grossly patent. The superior mesenteric vein, splenic vein and main portal veins are patent. The inferior vena cava is  unremarkable.  Peritoneum: No free intraperitoneal fluid or pneumoperitoneum.  Pelvic viscera: No acute findings.  Body wall: No acute findings. No significant body wall hernias.  Bones: No acute fracture. There is a sclerotic focus in the left iliac bone, likely a bony island or osseous metastasis. No aggressive osseous sclerotic or lucent lesions. Moderate multilevel degenerative changes of the lumbar spine.      There are multiple large intraluminal filling defects in the right lower lobe lobar, segmental and subsegmental pulmonary arteries, consistent with acute pulmonary embolism. Right lower lobe large area of wedge-shaped opacity, compatible with pulmonary infarct. There are few subsegmental pulmonary arterial filling defects in the left lower lobe, compatible with acute pulmonary emboli. There is evidence of right heart strain with reversal of interventricular septum. Correlate clinically.  There is a 5 mm pulmonary nodule in the left lower lobe. Few tiny pulmonary nodules in the right upper lobe. These nodules are indeterminate, metastasis cannot be excluded. Correlate clinically and consider follow-up imaging.  Innumerable hypodense hepatic lesions, compatible with metastases. Unknown primary.  Indeterminate 13 mm right adrenal nodule, concerning for metastasis.  No definite colonic mass is identified, suspected. Recommended colonoscopy evaluation if not already performed.  Nonobstructing bilateral nephrolithiasis.    These findings were discussed with JENELLE TORRES PA-C on 8/3/2023 1:19 PM by EDUARDO Grey, over the phone.  Images personally reviewed, interpreted and dictated by EDUARDO Grey.          This report was signed and finalized on 8/3/2023 1:28 PM by EDUARDO Grey.      CT Angiogram Chest Pulmonary Embolism    Result Date: 8/3/2023  CT SCAN OF THE CHEST, ABDOMEN AND PELVIS WITH CONTRAST    8/3/2023 1:17 PM  HISTORY: right flank pain .  PROCEDURE: Axial CT  images were obtained from the lung apex to the pubic symphysis following IV contrast administration. Coronal and sagittal reformatted images were generated from the axial data set and provided for interpretation. This study was performed with techniques to keep radiation doses as low as reasonably achievable, (ALARA). Individualized dose reduction techniques using automated exposure control or adjustment of mA and/or kV according to the patient size were employed.  COMPARISON: CT abdomen and pelvis dated 2/4/2016.  FINDINGS:  CHEST: Lungs/Pleura: Central airways are patent. Right lower lobe large peripheral wedge-shaped opacity, likely a pulmonary infarct. There is a 5 mm pulmonary nodule in the left lower lobe. There are a few tiny pulmonary nodules in the anterior right upper lobe. No pneumothorax or pleural effusion.  Vessels: There are multiple large intraluminal filling defects within the right lower lobe lobar, segmental and subsegmental pulmonary arteries, compatible with acute pulmonary embolism. There are few scattered areas of left lower lobe subsegmental filling defects, compatible with acute pulmonary emboli. Thoracic aorta is normal in caliber. Coronary artery disease is present.  Heart/mediastinum: Heart is normal in size. There is evidence of right heart strain with reversal of the intraventricular septum, right ventricle is measuring approximately 40 mm in diameter left ventricle is measuring approximately 44 mm in diameter (series 5, image 60). No pericardial effusion.  Lymph nodes: No pathologically enlarged thoracic lymph nodes. Right hilar lymphadenopathy, likely reactive/inflammatory. Partially calcified subcarinal and left hilar and right hilar lymph nodes.  Chest wall: No acute findings right chest wall. Port-A-Cath tip in the SVC.  Bones: No acute fracture. No aggressive osseous sclerotic or lucent lesions.  ABDOMEN/PELVIS: Liver, gallbladder and bile ducts: The liver demonstrates innumerable  hypodense lesions, compatible with hepatic steatosis, largest lesion in the right hemiliver measuring up to 5 cm. Unremarkable gallbladder. No biliary ductal dilatation.  Adrenal glands: There is indeterminate 13 mm right adrenal nodule. There is fat-containing 2.3 cm left adrenal nodule, probable adrenal myelolipoma.  Kidneys, ureters and urinary bladder: No suspicious renal lesions. Right nephrolithiasis with few stones in the right kidney measuring up to 6 mm. There are few stones in the left kidney midpole. No hydronephrosis. Unremarkable urinary bladder.  Spleen: The spleen is normal in size.  Pancreas: The pancreas is unremarkable.  Gastrointestinal system and mesentery: There is no evidence of bowel obstruction. The appendix is visualized and unremarkable. No significant mesenteric inflammation. No discrete colonic mass is identified. Moderate amount of colonic stool burden.  Lymph nodes: No pathologically enlarged abdominal or pelvic lymph nodes are present.  Vessels: The abdominal aorta is normal in caliber. The celiac trunk, superior mesenteric artery, inferior mesenteric artery and their branch vessels appear grossly patent. The superior mesenteric vein, splenic vein and main portal veins are patent. The inferior vena cava is unremarkable.  Peritoneum: No free intraperitoneal fluid or pneumoperitoneum.  Pelvic viscera: No acute findings.  Body wall: No acute findings. No significant body wall hernias.  Bones: No acute fracture. There is a sclerotic focus in the left iliac bone, likely a bony island or osseous metastasis. No aggressive osseous sclerotic or lucent lesions. Moderate multilevel degenerative changes of the lumbar spine.      There are multiple large intraluminal filling defects in the right lower lobe lobar, segmental and subsegmental pulmonary arteries, consistent with acute pulmonary embolism. Right lower lobe large area of wedge-shaped opacity, compatible with pulmonary infarct. There are  few subsegmental pulmonary arterial filling defects in the left lower lobe, compatible with acute pulmonary emboli. There is evidence of right heart strain with reversal of interventricular septum. Correlate clinically.  There is a 5 mm pulmonary nodule in the left lower lobe. Few tiny pulmonary nodules in the right upper lobe. These nodules are indeterminate, metastasis cannot be excluded. Correlate clinically and consider follow-up imaging.  Innumerable hypodense hepatic lesions, compatible with metastases. Unknown primary.  Indeterminate 13 mm right adrenal nodule, concerning for metastasis.  No definite colonic mass is identified, suspected. Recommended colonoscopy evaluation if not already performed.  Nonobstructing bilateral nephrolithiasis.    These findings were discussed with JENELLE TORRES PA-C on 8/3/2023 1:19 PM by EDUARDO Grey, over the phone.  Images personally reviewed, interpreted and dictated by EDUARDO Grey.          This report was signed and finalized on 8/3/2023 1:28 PM by EDUARDO Grey.       Assessment:    Bilateral pulmonary emboli with right lung infarct  Metastatic Stage IV Colon cancer  HTN  SUKHWINDER    Plan:    Bilateral PE with right lung infarct  -pulmonology consulted and appreciate recommendations  -patient receiving IV Heparin at this time. Will transition to Eliquis in the am  -Titrate oxygen to maintain saturation >90% (currently on RA)  -Bilateral Venous duplex to r/o DVT  -2D echo ordered    Colon cancer  -Continue with outpatient management of IV chemo per oncology  -Will need outpatient colonoscopy and GI follow up based on CT findings    HTN  Supportive care  Home meds continued    Sleep Apnea  CPAP per RT- patient does not have his machine  Continuous pulse oximetry      Risk Assessment: High  DVT Prophylaxis: Heparin  Code Status: Full  Diet: cardiac/cons. carb    Advance Care Planning   ACP discussion was held with the patient during this  visit. Patient does not have an advance directive, information provided.           MARTA Webster  08/03/23  17:08 EDT    Dictated utilizing Dragon dictation.

## 2023-08-04 LAB
ANION GAP SERPL CALCULATED.3IONS-SCNC: 8.6 MMOL/L (ref 5–15)
ANISOCYTOSIS BLD QL: NORMAL
BASOPHILS # BLD AUTO: 0.04 10*3/MM3 (ref 0–0.2)
BASOPHILS NFR BLD AUTO: 0.5 % (ref 0–1.5)
BUN SERPL-MCNC: 16 MG/DL (ref 6–20)
BUN/CREAT SERPL: 15.2 (ref 7–25)
CALCIUM SPEC-SCNC: 8.4 MG/DL (ref 8.6–10.5)
CHLORIDE SERPL-SCNC: 100 MMOL/L (ref 98–107)
CO2 SERPL-SCNC: 25.4 MMOL/L (ref 22–29)
CREAT SERPL-MCNC: 1.05 MG/DL (ref 0.76–1.27)
DEPRECATED RDW RBC AUTO: 65.1 FL (ref 37–54)
EGFRCR SERPLBLD CKD-EPI 2021: 84.9 ML/MIN/1.73
EOSINOPHIL # BLD AUTO: 0.09 10*3/MM3 (ref 0–0.4)
EOSINOPHIL NFR BLD AUTO: 1 % (ref 0.3–6.2)
ERYTHROCYTE [DISTWIDTH] IN BLOOD BY AUTOMATED COUNT: 20.9 % (ref 12.3–15.4)
GLUCOSE SERPL-MCNC: 101 MG/DL (ref 65–99)
HCT VFR BLD AUTO: 34.1 % (ref 37.5–51)
HGB BLD-MCNC: 11.1 G/DL (ref 13–17.7)
IMM GRANULOCYTES # BLD AUTO: 0.05 10*3/MM3 (ref 0–0.05)
IMM GRANULOCYTES NFR BLD AUTO: 0.6 % (ref 0–0.5)
LYMPHOCYTES # BLD AUTO: 2.56 10*3/MM3 (ref 0.7–3.1)
LYMPHOCYTES NFR BLD AUTO: 29.2 % (ref 19.6–45.3)
MCH RBC QN AUTO: 28 PG (ref 26.6–33)
MCHC RBC AUTO-ENTMCNC: 32.6 G/DL (ref 31.5–35.7)
MCV RBC AUTO: 85.9 FL (ref 79–97)
MONOCYTES # BLD AUTO: 1.07 10*3/MM3 (ref 0.1–0.9)
MONOCYTES NFR BLD AUTO: 12.2 % (ref 5–12)
NEUTROPHILS NFR BLD AUTO: 4.95 10*3/MM3 (ref 1.7–7)
NEUTROPHILS NFR BLD AUTO: 56.5 % (ref 42.7–76)
NRBC BLD AUTO-RTO: 0 /100 WBC (ref 0–0.2)
PLAT MORPH BLD: NORMAL
PLATELET # BLD AUTO: 143 10*3/MM3 (ref 140–450)
PMV BLD AUTO: 10.6 FL (ref 6–12)
POTASSIUM SERPL-SCNC: 4.4 MMOL/L (ref 3.5–5.2)
RBC # BLD AUTO: 3.97 10*6/MM3 (ref 4.14–5.8)
SODIUM SERPL-SCNC: 134 MMOL/L (ref 136–145)
UFH PPP CHRO-ACNC: 0.46 IU/ML (ref 0.3–0.7)
WBC MORPH BLD: NORMAL
WBC NRBC COR # BLD: 8.76 10*3/MM3 (ref 3.4–10.8)

## 2023-08-04 PROCEDURE — 99232 SBSQ HOSP IP/OBS MODERATE 35: CPT | Performed by: NURSE PRACTITIONER

## 2023-08-04 PROCEDURE — 80048 BASIC METABOLIC PNL TOTAL CA: CPT | Performed by: NURSE PRACTITIONER

## 2023-08-04 PROCEDURE — 85025 COMPLETE CBC W/AUTO DIFF WBC: CPT | Performed by: NURSE PRACTITIONER

## 2023-08-04 PROCEDURE — 85007 BL SMEAR W/DIFF WBC COUNT: CPT | Performed by: NURSE PRACTITIONER

## 2023-08-04 PROCEDURE — 85520 HEPARIN ASSAY: CPT | Performed by: FAMILY MEDICINE

## 2023-08-04 RX ADMIN — SENNOSIDES AND DOCUSATE SODIUM 2 TABLET: 50; 8.6 TABLET ORAL at 20:29

## 2023-08-04 RX ADMIN — BUPROPION HYDROCHLORIDE 300 MG: 150 TABLET, FILM COATED, EXTENDED RELEASE ORAL at 09:16

## 2023-08-04 RX ADMIN — APIXABAN 10 MG: 5 TABLET, FILM COATED ORAL at 09:16

## 2023-08-04 RX ADMIN — OXYCODONE HYDROCHLORIDE AND ACETAMINOPHEN 1 TABLET: 5; 325 TABLET ORAL at 21:50

## 2023-08-04 RX ADMIN — SENNOSIDES AND DOCUSATE SODIUM 2 TABLET: 50; 8.6 TABLET ORAL at 09:17

## 2023-08-04 RX ADMIN — Medication 10 ML: at 20:30

## 2023-08-04 RX ADMIN — HYDROCHLOROTHIAZIDE 25 MG: 25 TABLET ORAL at 09:16

## 2023-08-04 RX ADMIN — FLUTICASONE PROPIONATE 1 SPRAY: 50 SPRAY, METERED NASAL at 09:24

## 2023-08-04 RX ADMIN — ALLOPURINOL 300 MG: 100 TABLET ORAL at 09:16

## 2023-08-04 RX ADMIN — Medication 10 ML: at 09:17

## 2023-08-04 RX ADMIN — PANTOPRAZOLE SODIUM 40 MG: 40 TABLET, DELAYED RELEASE ORAL at 09:16

## 2023-08-04 RX ADMIN — TAMSULOSIN HYDROCHLORIDE 0.4 MG: 0.4 CAPSULE ORAL at 09:16

## 2023-08-04 RX ADMIN — AZELASTINE HYDROCHLORIDE 1 SPRAY: 137 SPRAY, METERED NASAL at 09:24

## 2023-08-04 RX ADMIN — LEVOTHYROXINE SODIUM 50 MCG: 0.05 TABLET ORAL at 06:18

## 2023-08-04 RX ADMIN — OXYCODONE HYDROCHLORIDE AND ACETAMINOPHEN 1 TABLET: 5; 325 TABLET ORAL at 06:18

## 2023-08-04 RX ADMIN — OXYCODONE HYDROCHLORIDE AND ACETAMINOPHEN 1 TABLET: 5; 325 TABLET ORAL at 01:13

## 2023-08-04 RX ADMIN — SPIRONOLACTONE 100 MG: 25 TABLET, FILM COATED ORAL at 09:16

## 2023-08-04 RX ADMIN — LISINOPRIL 20 MG: 20 TABLET ORAL at 09:17

## 2023-08-04 RX ADMIN — APIXABAN 10 MG: 5 TABLET, FILM COATED ORAL at 20:29

## 2023-08-04 NOTE — CASE MANAGEMENT/SOCIAL WORK
Discharge Planning Assessment  [facility]     Patient Name: Ladarius Jones  MRN: [id number]  Today's Date: 8/4/2023    Admit Date: 8/3/2023    Plan: Dcp initated at bedside with pt who provided demographics. Pt's primary is Maame Higgins, Dr. Selin Salas is his oncologist, and [facility] in [place] is preferred pharmacy. Enrollment in meds to bed acceptable. He has not been inpatient in past 30 days, has no LW or POA. Only home dme is a cpap. Financial concerns denied. Pt and his wife have been at current address 5 yrs. His dcp is to return home with family transporting, no dc concerns voiced.   Discharge Needs Assessment       Row Name 08/04/23 1045       Living Environment    People in Home spouse    Current Living Arrangements home    Duration at Residence 5 yrs    Potentially Unsafe Housing Conditions none    Primary Care Provided by self    Family Caregiver if Needed child(milan), adult;spouse    Quality of Family Relationships helpful;involved    Able to Return to Prior Arrangements yes       Resource/Environmental Concerns    Resource/Environmental Concerns none    Transportation Concerns none       Food Insecurity    Within the past 12 months, you worried that your food would run out before you got the money to buy more. Never true    Within the past 12 months, the food you bought just didn't last and you didn't have money to get more. Never true       Transition Planning    Patient/Family Anticipates Transition to home with family    Patient/Family Anticipated Services at Transition none    Transportation Anticipated family or friend will provide       Discharge Needs Assessment    Readmission Within the Last 30 Days no previous admission in last 30 days    Concerns to be Addressed no discharge needs identified    Anticipated Changes Related to Illness none    Equipment Needed After Discharge none                   Discharge Plan       Row Name 08/04/23 1057       Plan    Plan Dcp initated at bedside with  pt who provided demographics. Pt's primary is Maame Higgins, Dr. Selin Salas is his oncologist, and [facility] in [place] is preferred pharmacy. Enrollment in meds to bed acceptable. He has not been inpatient in past 30 days, has no LW or POA. Only home dme is a cpap. Financial concerns denied. Pt and his wife have been at current address 5 yrs. His dcp is to return home with family transporting, no dc concerns voiced.                  Continued Care and Services - Admitted Since 8/3/2023    Coordination has not been started for this encounter.          Demographic Summary       Row Name 08/04/23 1044       General Information    Admission Type inpatient    Arrived From emergency department    Referral Source admission list    Reason for Consult discharge planning    Preferred Language English       Contact Information    Permission Granted to Share Info With family/designee                   Functional Status       Row Name 08/04/23 1044       Functional Status    Usual Activity Tolerance good    Current Activity Tolerance good       Functional Status, IADL    Medications independent    Meal Preparation independent    Housekeeping independent    Laundry independent    Shopping independent       Mental Status    General Appearance WDL WDL       Mental Status Summary    Recent Changes in Mental Status/Cognitive Functioning no changes       Employment/    Employment Status unemployed                   Psychosocial    No documentation.                  Abuse/Neglect    No documentation.                  Legal    No documentation.                  Substance Abuse    No documentation.                  Patient Forms    No documentation.                     Florina Pichardo, RN

## 2023-08-04 NOTE — PROGRESS NOTES
[facility]    Name:  Ladarius Jones   Age:  52 y.o.  Sex:  male  :  1970  MRN:  [id number]   Visit Number:  [id number]  Admission Date:  8/3/2023  Date Of Service:  23  Primary Care Physician:  Maame Higgins DO     LOS: 1 day :    Chief Complaint:      Chest pain, abdominal pain, PE    Subjective:    Patient seen and examined at the bedside this am. Sitting up on bedside. Appears in no distress. Reports significant improvement over night. He states pain has improved with oral pain medication. He states that the shortness of breath is still mildly present but seems better as well. He tells me he did not use the CPAP last night but did use the oxygen only at night. Denies cough or hemoptysis. We did discuss results from US duplex which was positive for DVT above left knee. Reviewed 2D echo. Patient will transition to oral eliquis today and discontinue Heparin gtt. Patient has no other complaints at this time.     Hospital Course:    This is a pleasant 52-year-old male patient who presents to the hospital today with complaints of right upper quadrant/right lower lateral chest discomfort for the last several days.  Patient states that he is also noticed some shortness of breath, mostly with exertion and when he is experiencing the pain in his right side.  He has a history of colon cancer stage IV with mets to the liver, diabetes mellitus type 2 that is currently untreated, hypertension to which she is taking spironolactone, and sleep apnea and wears a CPAP at night.  He is currently receiving infusion chemotherapy every 2 weeks at [facility].  He is not currently receiving radiation treatments.  No surgical intervention at this time.  He states for the last several days he has noticed progressively worsening right-sided pain.  He denies any cough, more specifically any productive cough or hemoptysis.  Denies any recent travel.  Denies any  unilateral leg swelling or pain.  Denies any previous episodes of PE or DVT.  He does report that he receives an injectable anticoagulant with each chemotherapy treatment he has received.  He denies any fever or chills.  Denies any nausea or vomiting.  Denies any diarrhea.  Pain is worse with activity and movement.     He presented to the emergency department today complaining of right upper quadrant abdominal pain and some shortness of breath.  An EKG was performed which revealed sinus rhythm with a rate of 88 bpm.  No acute ST changes were noted.  CBC was unremarkable.  CMP was noted for sodium of 135 but otherwise unremarkable.  Lipase was 99.  BNP normal.  Normal cardiac enzyme.  CT of the abdomen and pelvis with contrast and CAT scan of the chest reveal multiple large bilateral acute pulmonary embolisms.  There is additionally an area of a wedge-shaped opacity that is compatible with pulmonary infarct on the right side.  There is evidence of right heart strain.  A notable 5 mm pulmonary nodule in the left lower lobe and a few tiny pulmonary nodules in the right upper lobe.  These are indeterminate and metastasis could not be excluded based upon CT read.  There is also an indeterminate 13 mm right adrenal nodule which is concerning for metastasis.  There is no obvious colonic mass that is identified.  It is recommended for the patient to have a colonoscopy.  The patient was initiated on a heparin drip with bolus.  This will be managed by pharmacy.  The hospitalist was consulted for admission for further management of care.  Additionally, pulmonology was consulted and has agreed to see the patient for further recommendations.  He is currently on room air upon my examination and maintaining oxygen saturation greater than 95% on room air. Patient transitioned from IV Heparin to oral Eliquis as of 8/4/2023       Review of Systems:     All systems were reviewed and negative except as mentioned in subjective,  assessment and plan.    Vital Signs:    Temp:  [98 øF (36.7 øC)-99.6 øF (37.6 øC)] 98.4 øF (36.9 øC)  Heart Rate:  [] 98  Resp:  [15-20] 15  BP: (102-152)/(57-97) 117/76    Intake and output:    No intake/output data recorded.  No intake/output data recorded.    Physical Examination:    General Appearance:  Alert and cooperative. NAD. Afebrile   Head:  Atraumatic and normocephalic.   Eyes: Conjunctivae and sclerae normal, no icterus. No pallor.   Throat: No oral lesions, no thrush, oral mucosa moist.   Neck: Supple, trachea midline, no thyromegaly.   Lungs:   Breath sounds heard bilaterally equally.  No wheezing. Crackles to RLL. No Pleural rub or bronchial breathing. RR even, unlabored at rest. Currently on RA, SP02 94%   Heart:  Normal S1 and S2, no murmur, no gallop, no rub. No JVD.   Abdomen:   Normal bowel sounds, no masses, no organomegaly. Soft, nontender, nondistended, no rebound tenderness.   Extremities: Supple, no edema, no cyanosis, no clubbing.   Skin: No bleeding or rash.   Neurologic: Alert and oriented x 3. No facial asymmetry. Moves all four limbs. No tremors.      Laboratory results:    Results from last 7 days   Lab Units 08/04/23  0443 08/03/23  1122   SODIUM mmol/L 134* 135*   POTASSIUM mmol/L 4.4 4.5   CHLORIDE mmol/L 100 101   CO2 mmol/L 25.4 22.6   BUN mg/dL 16 16   CREATININE mg/dL 1.05 0.99   CALCIUM mg/dL 8.4* 8.9   BILIRUBIN mg/dL  --  0.6   ALK PHOS U/L  --  122*   ALT (SGPT) U/L  --  27   AST (SGOT) U/L  --  29   GLUCOSE mg/dL 101* 98     Results from last 7 days   Lab Units 08/04/23  0443 08/03/23  1122   WBC 10*3/mm3 8.76 9.83   HEMOGLOBIN g/dL 11.1* 13.0   HEMATOCRIT % 34.1* 39.3   PLATELETS 10*3/mm3 143 174     Results from last 7 days   Lab Units 08/03/23  1122   INR  1.04     Results from last 7 days   Lab Units 08/03/23  1122   HSTROP T ng/L 8         No results for input(s): PHART, PCR8SUP, PO2ART, ZUA7LJI, BASEEXCESS in the last 8760 hours.   I have reviewed the patient's  laboratory results.    Radiology results:    Adult Transthoracic Echo Complete w/ Color, Spectral and Contrast if necessary per protocol    Result Date: 8/3/2023    Left ventricular systolic function is normal. Estimated left ventricular EF = 64% Left ventricular ejection fraction appears to be 61 - 65%.   Left ventricular diastolic function was normal.     CT Abdomen Pelvis With Contrast    Result Date: 8/3/2023  CT SCAN OF THE CHEST, ABDOMEN AND PELVIS WITH CONTRAST    8/3/2023 1:17 PM  HISTORY: right flank pain .  PROCEDURE: Axial CT images were obtained from the lung apex to the pubic symphysis following IV contrast administration. Coronal and sagittal reformatted images were generated from the axial data set and provided for interpretation. This study was performed with techniques to keep radiation doses as low as reasonably achievable, (ALARA). Individualized dose reduction techniques using automated exposure control or adjustment of mA and/or kV according to the patient size were employed.  COMPARISON: CT abdomen and pelvis dated 2/4/2016.  FINDINGS:  CHEST: Lungs/Pleura: Central airways are patent. Right lower lobe large peripheral wedge-shaped opacity, likely a pulmonary infarct. There is a 5 mm pulmonary nodule in the left lower lobe. There are a few tiny pulmonary nodules in the anterior right upper lobe. No pneumothorax or pleural effusion.  Vessels: There are multiple large intraluminal filling defects within the right lower lobe lobar, segmental and subsegmental pulmonary arteries, compatible with acute pulmonary embolism. There are few scattered areas of left lower lobe subsegmental filling defects, compatible with acute pulmonary emboli. Thoracic aorta is normal in caliber. Coronary artery disease is present.  Heart/mediastinum: Heart is normal in size. There is evidence of right heart strain with reversal of the intraventricular septum, right ventricle is measuring approximately 40 mm in diameter  left ventricle is measuring approximately 44 mm in diameter (series 5, image 60). No pericardial effusion.  Lymph nodes: No pathologically enlarged thoracic lymph nodes. Right hilar lymphadenopathy, likely reactive/inflammatory. Partially calcified subcarinal and left hilar and right hilar lymph nodes.  Chest wall: No acute findings right chest wall. Port-A-Cath tip in the SVC.  Bones: No acute fracture. No aggressive osseous sclerotic or lucent lesions.  ABDOMEN/PELVIS: Liver, gallbladder and bile ducts: The liver demonstrates innumerable hypodense lesions, compatible with hepatic steatosis, largest lesion in the right hemiliver measuring up to 5 cm. Unremarkable gallbladder. No biliary ductal dilatation.  Adrenal glands: There is indeterminate 13 mm right adrenal nodule. There is fat-containing 2.3 cm left adrenal nodule, probable adrenal myelolipoma.  Kidneys, ureters and urinary bladder: No suspicious renal lesions. Right nephrolithiasis with few stones in the right kidney measuring up to 6 mm. There are few stones in the left kidney midpole. No hydronephrosis. Unremarkable urinary bladder.  Spleen: The spleen is normal in size.  Pancreas: The pancreas is unremarkable.  Gastrointestinal system and mesentery: There is no evidence of bowel obstruction. The appendix is visualized and unremarkable. No significant mesenteric inflammation. No discrete colonic mass is identified. Moderate amount of colonic stool burden.  Lymph nodes: No pathologically enlarged abdominal or pelvic lymph nodes are present.  Vessels: The abdominal aorta is normal in caliber. The celiac trunk, superior mesenteric artery, inferior mesenteric artery and their branch vessels appear grossly patent. The superior mesenteric vein, splenic vein and main portal veins are patent. The inferior vena cava is unremarkable.  Peritoneum: No free intraperitoneal fluid or pneumoperitoneum.  Pelvic viscera: No acute findings.  Body wall: No acute findings.  No significant body wall hernias.  Bones: No acute fracture. There is a sclerotic focus in the left iliac bone, likely a bony island or osseous metastasis. No aggressive osseous sclerotic or lucent lesions. Moderate multilevel degenerative changes of the lumbar spine.      Impression: There are multiple large intraluminal filling defects in the right lower lobe lobar, segmental and subsegmental pulmonary arteries, consistent with acute pulmonary embolism. Right lower lobe large area of wedge-shaped opacity, compatible with pulmonary infarct. There are few subsegmental pulmonary arterial filling defects in the left lower lobe, compatible with acute pulmonary emboli. There is evidence of right heart strain with reversal of interventricular septum. Correlate clinically.  There is a 5 mm pulmonary nodule in the left lower lobe. Few tiny pulmonary nodules in the right upper lobe. These nodules are indeterminate, metastasis cannot be excluded. Correlate clinically and consider follow-up imaging.  Innumerable hypodense hepatic lesions, compatible with metastases. Unknown primary.  Indeterminate 13 mm right adrenal nodule, concerning for metastasis.  No definite colonic mass is identified, suspected. Recommended colonoscopy evaluation if not already performed.  Nonobstructing bilateral nephrolithiasis.    These findings were discussed with JENELLE TORRES PA-C on 8/3/2023 1:19 PM by EDUARDO Grey, over the phone.  Images personally reviewed, interpreted and dictated by EDUARDO Grey.          This report was signed and finalized on 8/3/2023 1:28 PM by EDUARDO Grey.      CT Angiogram Chest Pulmonary Embolism    Result Date: 8/3/2023  CT SCAN OF THE CHEST, ABDOMEN AND PELVIS WITH CONTRAST    8/3/2023 1:17 PM  HISTORY: right flank pain .  PROCEDURE: Axial CT images were obtained from the lung apex to the pubic symphysis following IV contrast administration. Coronal and sagittal reformatted  images were generated from the axial data set and provided for interpretation. This study was performed with techniques to keep radiation doses as low as reasonably achievable, (ALARA). Individualized dose reduction techniques using automated exposure control or adjustment of mA and/or kV according to the patient size were employed.  COMPARISON: CT abdomen and pelvis dated 2/4/2016.  FINDINGS:  CHEST: Lungs/Pleura: Central airways are patent. Right lower lobe large peripheral wedge-shaped opacity, likely a pulmonary infarct. There is a 5 mm pulmonary nodule in the left lower lobe. There are a few tiny pulmonary nodules in the anterior right upper lobe. No pneumothorax or pleural effusion.  Vessels: There are multiple large intraluminal filling defects within the right lower lobe lobar, segmental and subsegmental pulmonary arteries, compatible with acute pulmonary embolism. There are few scattered areas of left lower lobe subsegmental filling defects, compatible with acute pulmonary emboli. Thoracic aorta is normal in caliber. Coronary artery disease is present.  Heart/mediastinum: Heart is normal in size. There is evidence of right heart strain with reversal of the intraventricular septum, right ventricle is measuring approximately 40 mm in diameter left ventricle is measuring approximately 44 mm in diameter (series 5, image 60). No pericardial effusion.  Lymph nodes: No pathologically enlarged thoracic lymph nodes. Right hilar lymphadenopathy, likely reactive/inflammatory. Partially calcified subcarinal and left hilar and right hilar lymph nodes.  Chest wall: No acute findings right chest wall. Port-A-Cath tip in the SVC.  Bones: No acute fracture. No aggressive osseous sclerotic or lucent lesions.  ABDOMEN/PELVIS: Liver, gallbladder and bile ducts: The liver demonstrates innumerable hypodense lesions, compatible with hepatic steatosis, largest lesion in the right hemiliver measuring up to 5 cm. Unremarkable  gallbladder. No biliary ductal dilatation.  Adrenal glands: There is indeterminate 13 mm right adrenal nodule. There is fat-containing 2.3 cm left adrenal nodule, probable adrenal myelolipoma.  Kidneys, ureters and urinary bladder: No suspicious renal lesions. Right nephrolithiasis with few stones in the right kidney measuring up to 6 mm. There are few stones in the left kidney midpole. No hydronephrosis. Unremarkable urinary bladder.  Spleen: The spleen is normal in size.  Pancreas: The pancreas is unremarkable.  Gastrointestinal system and mesentery: There is no evidence of bowel obstruction. The appendix is visualized and unremarkable. No significant mesenteric inflammation. No discrete colonic mass is identified. Moderate amount of colonic stool burden.  Lymph nodes: No pathologically enlarged abdominal or pelvic lymph nodes are present.  Vessels: The abdominal aorta is normal in caliber. The celiac trunk, superior mesenteric artery, inferior mesenteric artery and their branch vessels appear grossly patent. The superior mesenteric vein, splenic vein and main portal veins are patent. The inferior vena cava is unremarkable.  Peritoneum: No free intraperitoneal fluid or pneumoperitoneum.  Pelvic viscera: No acute findings.  Body wall: No acute findings. No significant body wall hernias.  Bones: No acute fracture. There is a sclerotic focus in the left iliac bone, likely a bony island or osseous metastasis. No aggressive osseous sclerotic or lucent lesions. Moderate multilevel degenerative changes of the lumbar spine.      Impression: There are multiple large intraluminal filling defects in the right lower lobe lobar, segmental and subsegmental pulmonary arteries, consistent with acute pulmonary embolism. Right lower lobe large area of wedge-shaped opacity, compatible with pulmonary infarct. There are few subsegmental pulmonary arterial filling defects in the left lower lobe, compatible with acute pulmonary emboli.  There is evidence of right heart strain with reversal of interventricular septum. Correlate clinically.  There is a 5 mm pulmonary nodule in the left lower lobe. Few tiny pulmonary nodules in the right upper lobe. These nodules are indeterminate, metastasis cannot be excluded. Correlate clinically and consider follow-up imaging.  Innumerable hypodense hepatic lesions, compatible with metastases. Unknown primary.  Indeterminate 13 mm right adrenal nodule, concerning for metastasis.  No definite colonic mass is identified, suspected. Recommended colonoscopy evaluation if not already performed.  Nonobstructing bilateral nephrolithiasis.    These findings were discussed with JENELLE TORRES PA-C on 8/3/2023 1:19 PM by EDUARDO Grey, over the phone.  Images personally reviewed, interpreted and dictated by EDUARDO Grey.          This report was signed and finalized on 8/3/2023 1:28 PM by EDUARDO Grey.      US Venous Doppler Lower Extremity Bilateral (duplex)    Result Date: 8/3/2023  FINAL REPORT CLINICAL HISTORY: PE, r/o DVT FINDINGS: Right lower extremity: Grayscale, color Doppler and pulsed Doppler evaluation revealed normal compressibility, flow and augmentation.  There are normal venous waveforms. Left lower extremity: There is noncompressible thrombus occluding the femoral and popliteal veins.     Impression: Left DVT, above the knee. Authenticated and Electronically Signed by Viktor Gallegos M.D. on 08/03/2023 08:26:59 PM   I have reviewed the patient's radiology reports.    Medication Review:     I have reviewed the patient's active and prn medications.     Problem List:      Pulmonary embolus    Obstructive sleep apnea syndrome    Hypertensive disorder    Type 2 diabetes mellitus without complication      Assessment:    Bilateral pulmonary emboli with right lung infarct  Metastatic Stage IV Colon cancer  HTN  SUKHWINDER    Plan:    Bilateral PE with right lung infarct  -pulmonology  consulted and appreciate recommendations  -Heparin discontinued, patient initiated on Eliquis today  -Titrate oxygen to maintain saturation >90% (currently on RA)  -+DVT to LLE  -2D echo completed, normal EF  -hemodynamically stable     Colon cancer  -Continue with outpatient management of IV chemo per oncology  -Will need outpatient colonoscopy and GI follow up based on CT findings (adrenal lesions noted)     HTN  Supportive care  Home meds continued     Sleep Apnea  CPAP per RT- patient does not have his machine  Continuous pulse oximetry    DVT Prophylaxis: Eliquis  Code Status: Full  Diet: Cardiac/CC  Discharge Plan: pending    Michelle Guillen, APRN  08/04/23  15:47 EDT    Dictated utilizing Dragon dictation.

## 2023-08-04 NOTE — PLAN OF CARE
Goal Outcome Evaluation:              Outcome Evaluation: PT LYING IN BED RESTING AT THIS TIME. PT CONTINUES TO C/O INTERMITTENT RIGHT FLANK PAIN. PRN PAIN MEDICATIONS GIVEN PER MAR. NO ACUTE EVENTS THIS SHIFT. WILL CONTINUE TO MONITOR CLOSELY.

## 2023-08-04 NOTE — PAYOR COMM NOTE
"TO:BC  FROM:KRISTI CREWS, RN PHONE [phone]  IN CLINICALS REF# [id number]    Ladarius Nye Junior (52 y.o. Male)       Date of Birth   1970    Social Security Number       Address   [address] [place] [address]    Home Phone   [phone]    MRN   [id number]       Evangel[employer]   Unknown    Marital Status                               Admission Date   8/3/23    Admission Type   Emergency    Admitting Provider   Adonay Vásquez MD    Attending Provider   Adonay Vásquez MD    Department, Room/Bed   [facility] TELEMETRY 4, 429/1       Discharge Date       Discharge Disposition       Discharge Destination                                 Attending Provider: Adonay Vásquez MD    Allergies: No Known Allergies    Isolation: None   Infection: None   Code Status: CPR    Ht: 175.3 cm (69.02\")   Wt: 121 kg (267 lb 3.2 oz)    Admission Cmt: None   Principal Problem: Pulmonary embolus [I26.99]                   Active Insurance as of 8/3/2023       Primary Coverage       Payor Plan Insurance Group Employer/Plan Group    [employer] BLUE CROSS AN[employer] BLUE CROSS BLUE SHIELD PPO [id number]       Payor Plan Address Payor Plan Phone Number Payor Plan Fax Number Effective Dates    [address] [phone]  2023 - None Entered    [place] [address]         Subscriber Name Subscriber Birth Date Member ID       STEFANI NYE 1973 [id number]                     Emergency Contacts        (Rel.) Home Phone Work Phone Mobile Phone    Stefani Nye (Spouse) -- -- [phone]                 History & Physical        Michelle Guillen, APRN at 23 1545       Attestation signed by Adonay Vásquez MD at 23 0806    I have reviewed this documentation and agree.                    [facility] HOSPITALIST   HISTORY AND PHYSICAL      Name:  Ladarius Nye   Age:  52 y.o.  Sex:  male  :  1970  MRN:  [id number]   Visit Number:  " [id number]  Admission Date:  8/3/2023  Date Of Service:  08/03/23  Primary Care Physician:  Maame Higgins DO    Chief Complaint:     Chest/Abdominal pain, pulmonary embolus    History Of Presenting Illness:      This is a pleasant 52-year-old male patient who presents to the hospital today with complaints of right upper quadrant/right lower lateral chest discomfort for the last several days.  Patient states that he is also noticed some shortness of breath, mostly with exertion and when he is experiencing the pain in his right side.  He has a history of colon cancer stage IV with mets to the liver, diabetes mellitus type 2 that is currently untreated, hypertension to which she is taking spironolactone, and sleep apnea and wears a CPAP at night.  He is currently receiving infusion chemotherapy every 2 weeks at [facility].  He is not currently receiving radiation treatments.  No surgical intervention at this time.  He states for the last several days he has noticed progressively worsening right-sided pain.  He denies any cough, more specifically any productive cough or hemoptysis.  Denies any recent travel.  Denies any unilateral leg swelling or pain.  Denies any previous episodes of PE or DVT.  He does report that he receives an injectable anticoagulant with each chemotherapy treatment he has received.  He denies any fever or chills.  Denies any nausea or vomiting.  Denies any diarrhea.  Pain is worse with activity and movement.    He presented to the emergency department today complaining of right upper quadrant abdominal pain and some shortness of breath.  An EKG was performed which revealed sinus rhythm with a rate of 88 bpm.  No acute ST changes were noted.  CBC was unremarkable.  CMP was noted for sodium of 135 but otherwise unremarkable.  Lipase was 99.  BNP normal.  Normal cardiac enzyme.  CT of the abdomen and pelvis with contrast and CAT scan of the chest reveal multiple large bilateral  acute pulmonary embolisms.  There is additionally an area of a wedge-shaped opacity that is compatible with pulmonary infarct on the right side.  There is evidence of right heart strain.  A notable 5 mm pulmonary nodule in the left lower lobe and a few tiny pulmonary nodules in the right upper lobe.  These are indeterminate and metastasis could not be excluded based upon CT read.  There is also an indeterminate 13 mm right adrenal nodule which is concerning for metastasis.  There is no obvious colonic mass that is identified.  It is recommended for the patient to have a colonoscopy.  The patient was initiated on a heparin drip with bolus.  This will be managed by pharmacy.  The hospitalist was consulted for admission for further management of care.  Additionally, pulmonology was consulted and has agreed to see the patient for further recommendations.  He is currently on room air upon my examination and maintaining oxygen saturation greater than 95% on room air.    Review Of Systems:    All systems were reviewed and negative except as mentioned in history of presenting illness, assessment and plan.    Past Medical History: Patient  has a past medical history of Allergic rhinitis, Anemia, Benign prostate hyperplasia, Depression, Diabetes mellitus, Hypertension, Hyperuricemia, Osteoarthritis, and Sleep apnea.    Past Surgical History: Patient  has a past surgical history that includes Rotator cuff repair (Left); Wrist surgery (Right); Knee surgery (Left); Esophagogastroduodenoscopy (N/A, 5/8/2023); and Colonoscopy (N/A, 5/8/2023).    Social History: Patient  reports that he has never smoked. He has never used smokeless tobacco. He reports current alcohol use. He reports that he does not use drugs.    Family History:  Patient's family history has been reviewed and found to be noncontributory.     Allergies:      Patient has no known allergies.    Home Medications:    Prior to Admission Medications       Prescriptions  Last Dose Informant Patient Reported? Taking?    allopurinol (ZYLOPRIM) 300 MG tablet   Yes No    Take 1 tablet by mouth Daily.    azelastine (ASTELIN) 0.1 % nasal spray   Yes No    One spray each nostril at night as needed    buPROPion XL (WELLBUTRIN XL) 300 MG 24 hr tablet   Yes No    Take 1 tablet by mouth Daily.    ferrous sulfate 325 (65 FE) MG tablet   Yes No    Take 1 tablet by mouth Daily With Breakfast.    fluticasone (FLONASE) 50 MCG/ACT nasal spray   Yes No    INHALE 2 SPRAYS BY INTRANASAL ROUTE EVERY DAY IN EACH NOSTRIL    Hydrocortisone, Perianal, (ANUSOL-HC) 2.5 % rectal cream   No No    Insert  into the rectum 2 (Two) Times a Day As Needed for Hemorrhoids.    levothyroxine (SYNTHROID, LEVOTHROID) 50 MCG tablet   Yes No    Take 1 tablet by mouth Every Morning.    lisinopril-hydrochlorothiazide (PRINZIDE,ZESTORETIC) 20-25 MG per tablet   Yes No    TAKE 1 TABLET BY ORAL ROUTE EVERY DAY    metFORMIN ER (GLUCOPHAGE-XR) 500 MG 24 hr tablet   Yes No    Take 1 tablet by mouth Daily With Breakfast.    Patient not taking:  Reported on 7/6/2023    ondansetron (ZOFRAN) 4 MG tablet   Yes No    Take 1 tablet by mouth As Needed.    pantoprazole (PROTONIX) 40 MG EC tablet   No No    Take 1 tablet by mouth Daily.    spironolactone (ALDACTONE) 100 MG tablet   Yes No    Take 1 tablet by mouth Daily.    tamsulosin (FLOMAX) 0.4 MG capsule 24 hr capsule   Yes No    Take 1 capsule by mouth Daily. 2 CAPSULES BY MOUTH DAILY          ED Medications:    Medications   sodium chloride 0.9 % flush 10 mL (has no administration in time range)   heparin 95791 units/250 ml (100 units/ml) in D5W (12.3 Units/kg/hr x 122 kg Intravenous Currently Infusing 8/3/23 1500)   Pharmacy to Dose Heparin (has no administration in time range)   allopurinol (ZYLOPRIM) tablet 300 mg (has no administration in time range)   azelastine (ASTELIN) nasal spray 1 spray (has no administration in time range)   buPROPion XL (WELLBUTRIN XL) 24 hr tablet 300 mg  (has no administration in time range)   fluticasone (FLONASE) 50 MCG/ACT nasal spray 1 spray (has no administration in time range)   levothyroxine (SYNTHROID, LEVOTHROID) tablet 50 mcg (has no administration in time range)   lisinopril (PRINIVIL,ZESTRIL) tablet 20 mg (has no administration in time range)     And   hydroCHLOROthiazide (HYDRODIURIL) tablet 25 mg (has no administration in time range)   pantoprazole (PROTONIX) EC tablet 40 mg (has no administration in time range)   spironolactone (ALDACTONE) tablet 100 mg (has no administration in time range)   tamsulosin (FLOMAX) 24 hr capsule 0.4 mg (has no administration in time range)   sodium chloride 0.9 % flush 10 mL (has no administration in time range)   sodium chloride 0.9 % flush 10 mL (has no administration in time range)   sodium chloride 0.9 % infusion 40 mL (has no administration in time range)   sennosides-docusate (PERICOLACE) 8.6-50 MG per tablet 2 tablet (has no administration in time range)     And   polyethylene glycol (MIRALAX) packet 17 g (has no administration in time range)     And   bisacodyl (DULCOLAX) EC tablet 5 mg (has no administration in time range)     And   bisacodyl (DULCOLAX) suppository 10 mg (has no administration in time range)   nitroglycerin (NITROSTAT) SL tablet 0.4 mg (has no administration in time range)   Potassium Replacement - Follow Nurse / BPA Driven Protocol (has no administration in time range)   Magnesium Standard Dose Replacement - Follow Nurse / BPA Driven Protocol (has no administration in time range)   Phosphorus Replacement - Follow Nurse / BPA Driven Protocol (has no administration in time range)   Calcium Replacement - Follow Nurse / BPA Driven Protocol (has no administration in time range)   morphine injection 4 mg (has no administration in time range)     And   naloxone (NARCAN) injection 0.4 mg (has no administration in time range)   oxyCODONE-acetaminophen (PERCOCET) 5-325 MG per tablet 1 tablet (has no  "administration in time range)   LORazepam (ATIVAN) tablet 0.5 mg (has no administration in time range)   melatonin tablet 5 mg (has no administration in time range)   ondansetron (ZOFRAN) tablet 4 mg (has no administration in time range)     Or   ondansetron (ZOFRAN) injection 4 mg (has no administration in time range)   iopamidol (ISOVUE-300) 61 % injection 100 mL (100 mL Intravenous Given 8/3/23 1234)   heparin (porcine) injection 9,760 Units (9,760 Units Intravenous Given 8/3/23 1423)     Vital Signs:  Temp:  [98.1 øF (36.7 øC)] 98.1 øF (36.7 øC)  Heart Rate:  [] 97  Resp:  [17-18] 18  BP: (113-146)/(63-82) 146/80        08/03/23  1034 08/03/23  1456   Weight: 122 kg (270 lb) 131 kg (289 lb 12.8 oz)     Body mass index is 42.8 kg/mý.    Physical Exam:     Most recent vital Signs: /80 (BP Location: Right arm, Patient Position: Lying)   Pulse 97   Temp 98.1 øF (36.7 øC) (Oral)   Resp 18   Ht 175.3 cm (69\")   Wt 131 kg (289 lb 12.8 oz)   SpO2 96%   BMI 42.80 kg/mý     Physical Exam    Laboratory data:    I have reviewed the labs done in the emergency room.    Results from last 7 days   Lab Units 08/03/23  1122   SODIUM mmol/L 135*   POTASSIUM mmol/L 4.5   CHLORIDE mmol/L 101   CO2 mmol/L 22.6   BUN mg/dL 16   CREATININE mg/dL 0.99   CALCIUM mg/dL 8.9   BILIRUBIN mg/dL 0.6   ALK PHOS U/L 122*   ALT (SGPT) U/L 27   AST (SGOT) U/L 29   GLUCOSE mg/dL 98     Results from last 7 days   Lab Units 08/03/23  1122   WBC 10*3/mm3 9.83   HEMOGLOBIN g/dL 13.0   HEMATOCRIT % 39.3   PLATELETS 10*3/mm3 174     Results from last 7 days   Lab Units 08/03/23  1122   INR  1.04     Results from last 7 days   Lab Units 08/03/23  1122   HSTROP T ng/L 8     Results from last 7 days   Lab Units 08/03/23  1122   PROBNP pg/mL <36.0         Results from last 7 days   Lab Units 08/03/23  1122   LIPASE U/L 99*         Results from last 7 days   Lab Units 08/03/23  1123   COLOR UA  Yellow   GLUCOSE UA  Negative   KETONES UA  " Negative   LEUKOCYTES UA  Moderate (2+)*   PH, URINE  6.0   BILIRUBIN UA  Negative   UROBILINOGEN UA  0.2 E.U./dL   RBC UA /HPF None Seen   WBC UA /HPF 13-20*       Pain Management Panel           No data to display                EKG:      Reviewed by me.  Normal sinus rhythm.  Heart rate 88.  No acute ST changes.    Radiology:    CT Abdomen Pelvis With Contrast    Result Date: 8/3/2023  CT SCAN OF THE CHEST, ABDOMEN AND PELVIS WITH CONTRAST    8/3/2023 1:17 PM  HISTORY: right flank pain .  PROCEDURE: Axial CT images were obtained from the lung apex to the pubic symphysis following IV contrast administration. Coronal and sagittal reformatted images were generated from the axial data set and provided for interpretation. This study was performed with techniques to keep radiation doses as low as reasonably achievable, (ALARA). Individualized dose reduction techniques using automated exposure control or adjustment of mA and/or kV according to the patient size were employed.  COMPARISON: CT abdomen and pelvis dated 2/4/2016.  FINDINGS:  CHEST: Lungs/Pleura: Central airways are patent. Right lower lobe large peripheral wedge-shaped opacity, likely a pulmonary infarct. There is a 5 mm pulmonary nodule in the left lower lobe. There are a few tiny pulmonary nodules in the anterior right upper lobe. No pneumothorax or pleural effusion.  Vessels: There are multiple large intraluminal filling defects within the right lower lobe lobar, segmental and subsegmental pulmonary arteries, compatible with acute pulmonary embolism. There are few scattered areas of left lower lobe subsegmental filling defects, compatible with acute pulmonary emboli. Thoracic aorta is normal in caliber. Coronary artery disease is present.  Heart/mediastinum: Heart is normal in size. There is evidence of right heart strain with reversal of the intraventricular septum, right ventricle is measuring approximately 40 mm in diameter left ventricle is measuring  approximately 44 mm in diameter (series 5, image 60). No pericardial effusion.  Lymph nodes: No pathologically enlarged thoracic lymph nodes. Right hilar lymphadenopathy, likely reactive/inflammatory. Partially calcified subcarinal and left hilar and right hilar lymph nodes.  Chest wall: No acute findings right chest wall. Port-A-Cath tip in the SVC.  Bones: No acute fracture. No aggressive osseous sclerotic or lucent lesions.  ABDOMEN/PELVIS: Liver, gallbladder and bile ducts: The liver demonstrates innumerable hypodense lesions, compatible with hepatic steatosis, largest lesion in the right hemiliver measuring up to 5 cm. Unremarkable gallbladder. No biliary ductal dilatation.  Adrenal glands: There is indeterminate 13 mm right adrenal nodule. There is fat-containing 2.3 cm left adrenal nodule, probable adrenal myelolipoma.  Kidneys, ureters and urinary bladder: No suspicious renal lesions. Right nephrolithiasis with few stones in the right kidney measuring up to 6 mm. There are few stones in the left kidney midpole. No hydronephrosis. Unremarkable urinary bladder.  Spleen: The spleen is normal in size.  Pancreas: The pancreas is unremarkable.  Gastrointestinal system and mesentery: There is no evidence of bowel obstruction. The appendix is visualized and unremarkable. No significant mesenteric inflammation. No discrete colonic mass is identified. Moderate amount of colonic stool burden.  Lymph nodes: No pathologically enlarged abdominal or pelvic lymph nodes are present.  Vessels: The abdominal aorta is normal in caliber. The celiac trunk, superior mesenteric artery, inferior mesenteric artery and their branch vessels appear grossly patent. The superior mesenteric vein, splenic vein and main portal veins are patent. The inferior vena cava is unremarkable.  Peritoneum: No free intraperitoneal fluid or pneumoperitoneum.  Pelvic viscera: No acute findings.  Body wall: No acute findings. No significant body wall  hernias.  Bones: No acute fracture. There is a sclerotic focus in the left iliac bone, likely a bony island or osseous metastasis. No aggressive osseous sclerotic or lucent lesions. Moderate multilevel degenerative changes of the lumbar spine.      There are multiple large intraluminal filling defects in the right lower lobe lobar, segmental and subsegmental pulmonary arteries, consistent with acute pulmonary embolism. Right lower lobe large area of wedge-shaped opacity, compatible with pulmonary infarct. There are few subsegmental pulmonary arterial filling defects in the left lower lobe, compatible with acute pulmonary emboli. There is evidence of right heart strain with reversal of interventricular septum. Correlate clinically.  There is a 5 mm pulmonary nodule in the left lower lobe. Few tiny pulmonary nodules in the right upper lobe. These nodules are indeterminate, metastasis cannot be excluded. Correlate clinically and consider follow-up imaging.  Innumerable hypodense hepatic lesions, compatible with metastases. Unknown primary.  Indeterminate 13 mm right adrenal nodule, concerning for metastasis.  No definite colonic mass is identified, suspected. Recommended colonoscopy evaluation if not already performed.  Nonobstructing bilateral nephrolithiasis.    These findings were discussed with JENELLE TORRES PA-C on 8/3/2023 1:19 PM by EDUARDO Grey, over the phone.  Images personally reviewed, interpreted and dictated by EDUARDO Grey.          This report was signed and finalized on 8/3/2023 1:28 PM by EDUARDO Grey.      CT Angiogram Chest Pulmonary Embolism    Result Date: 8/3/2023  CT SCAN OF THE CHEST, ABDOMEN AND PELVIS WITH CONTRAST    8/3/2023 1:17 PM  HISTORY: right flank pain .  PROCEDURE: Axial CT images were obtained from the lung apex to the pubic symphysis following IV contrast administration. Coronal and sagittal reformatted images were generated from the axial  data set and provided for interpretation. This study was performed with techniques to keep radiation doses as low as reasonably achievable, (ALARA). Individualized dose reduction techniques using automated exposure control or adjustment of mA and/or kV according to the patient size were employed.  COMPARISON: CT abdomen and pelvis dated 2/4/2016.  FINDINGS:  CHEST: Lungs/Pleura: Central airways are patent. Right lower lobe large peripheral wedge-shaped opacity, likely a pulmonary infarct. There is a 5 mm pulmonary nodule in the left lower lobe. There are a few tiny pulmonary nodules in the anterior right upper lobe. No pneumothorax or pleural effusion.  Vessels: There are multiple large intraluminal filling defects within the right lower lobe lobar, segmental and subsegmental pulmonary arteries, compatible with acute pulmonary embolism. There are few scattered areas of left lower lobe subsegmental filling defects, compatible with acute pulmonary emboli. Thoracic aorta is normal in caliber. Coronary artery disease is present.  Heart/mediastinum: Heart is normal in size. There is evidence of right heart strain with reversal of the intraventricular septum, right ventricle is measuring approximately 40 mm in diameter left ventricle is measuring approximately 44 mm in diameter (series 5, image 60). No pericardial effusion.  Lymph nodes: No pathologically enlarged thoracic lymph nodes. Right hilar lymphadenopathy, likely reactive/inflammatory. Partially calcified subcarinal and left hilar and right hilar lymph nodes.  Chest wall: No acute findings right chest wall. Port-A-Cath tip in the SVC.  Bones: No acute fracture. No aggressive osseous sclerotic or lucent lesions.  ABDOMEN/PELVIS: Liver, gallbladder and bile ducts: The liver demonstrates innumerable hypodense lesions, compatible with hepatic steatosis, largest lesion in the right hemiliver measuring up to 5 cm. Unremarkable gallbladder. No biliary ductal dilatation.   Adrenal glands: There is indeterminate 13 mm right adrenal nodule. There is fat-containing 2.3 cm left adrenal nodule, probable adrenal myelolipoma.  Kidneys, ureters and urinary bladder: No suspicious renal lesions. Right nephrolithiasis with few stones in the right kidney measuring up to 6 mm. There are few stones in the left kidney midpole. No hydronephrosis. Unremarkable urinary bladder.  Spleen: The spleen is normal in size.  Pancreas: The pancreas is unremarkable.  Gastrointestinal system and mesentery: There is no evidence of bowel obstruction. The appendix is visualized and unremarkable. No significant mesenteric inflammation. No discrete colonic mass is identified. Moderate amount of colonic stool burden.  Lymph nodes: No pathologically enlarged abdominal or pelvic lymph nodes are present.  Vessels: The abdominal aorta is normal in caliber. The celiac trunk, superior mesenteric artery, inferior mesenteric artery and their branch vessels appear grossly patent. The superior mesenteric vein, splenic vein and main portal veins are patent. The inferior vena cava is unremarkable.  Peritoneum: No free intraperitoneal fluid or pneumoperitoneum.  Pelvic viscera: No acute findings.  Body wall: No acute findings. No significant body wall hernias.  Bones: No acute fracture. There is a sclerotic focus in the left iliac bone, likely a bony island or osseous metastasis. No aggressive osseous sclerotic or lucent lesions. Moderate multilevel degenerative changes of the lumbar spine.      There are multiple large intraluminal filling defects in the right lower lobe lobar, segmental and subsegmental pulmonary arteries, consistent with acute pulmonary embolism. Right lower lobe large area of wedge-shaped opacity, compatible with pulmonary infarct. There are few subsegmental pulmonary arterial filling defects in the left lower lobe, compatible with acute pulmonary emboli. There is evidence of right heart strain with reversal of  interventricular septum. Correlate clinically.  There is a 5 mm pulmonary nodule in the left lower lobe. Few tiny pulmonary nodules in the right upper lobe. These nodules are indeterminate, metastasis cannot be excluded. Correlate clinically and consider follow-up imaging.  Innumerable hypodense hepatic lesions, compatible with metastases. Unknown primary.  Indeterminate 13 mm right adrenal nodule, concerning for metastasis.  No definite colonic mass is identified, suspected. Recommended colonoscopy evaluation if not already performed.  Nonobstructing bilateral nephrolithiasis.    These findings were discussed with JENELLE TORRES PA-C on 8/3/2023 1:19 PM by EDUARDO Grey, over the phone.  Images personally reviewed, interpreted and dictated by EDUARDO Grey.          This report was signed and finalized on 8/3/2023 1:28 PM by EDUARDO Grey.       Assessment:    Bilateral pulmonary emboli with right lung infarct  Metastatic Stage IV Colon cancer  HTN  SUKHWINDER    Plan:    Bilateral PE with right lung infarct  -pulmonology consulted and appreciate recommendations  -patient receiving IV Heparin at this time. Will transition to Eliquis in the am  -Titrate oxygen to maintain saturation >90% (currently on RA)  -Bilateral Venous duplex to r/o DVT  -2D echo ordered    Colon cancer  -Continue with outpatient management of IV chemo per oncology  -Will need outpatient colonoscopy and GI follow up based on CT findings    HTN  Supportive care  Home meds continued    Sleep Apnea  CPAP per RT- patient does not have his machine  Continuous pulse oximetry      Risk Assessment: High  DVT Prophylaxis: Heparin  Code Status: Full  Diet: cardiac/cons. carb    Advance Care Planning   ACP discussion was held with the patient during this visit. Patient does not have an advance directive, information provided.           MARTA Webster  08/03/23  17:08 EDT    Dictated utilizing Dragon  dictation.    Electronically signed by Adonay Vásquez MD at 08/04/23 0806          Emergency Department Notes        Tammy Payne RN at 08/03/23 1356          Started pt on 0.5L NC for pt comfort    Electronically signed by Tammy Payne RN at 08/03/23 1416       Tomas Castellano PA-C at 08/03/23 1348       Attestation signed by Nikia Ramsey MD at 08/03/23 1622    EKG interpreted by me reveals sinus rhythm with rate of 88 bpm.  There is low QRS voltage in chest leads.  This is an atypical appearing EKG.    NON FACE TO FACE: This visit was performed by BOTH a physician and an APC. I performed all aspects of the MDM as documented.  Nikia Ramsey MD 8/3/2023 16:22 EDT                         Subjective  History of Present Illness:    Chief Complaint:   Chief Complaint   Patient presents with    Abdominal Pain      History of Present Illness: Ladarius Jones is a 52 y.o. male who presents to the emergency department complaining of right upper quadrant/right flank/right costal margin pain and some shortness of breath.  Patient has a history of colon cancer stage IV with mets to the liver on chemotherapy.  Gets biweekly to daily chemo treatments through port in his right chest.  States about a week ago noticed his symptoms.  No significant cough, no dysuria.  Follows with [facility] cancer at .  Onset: 1 week ago  Duration: Ongoing  Exacerbating / Alleviating factors: Worse with activity  Associated symptoms: None      Nurses Notes reviewed and agree, including vitals, allergies, social history and prior medical history.     Review of Systems   Constitutional: Negative.    HENT: Negative.     Eyes: Negative.    Respiratory:  Positive for shortness of breath.    Cardiovascular: Negative.    Gastrointestinal:         Right flank pain   Genitourinary: Negative.    Musculoskeletal: Negative.    Skin: Negative.    Allergic/Immunologic: Negative.    Neurological: Negative.   "  Psychiatric/Behavioral: Negative.     All other systems reviewed and are negative.    Past Medical History:   Diagnosis Date    Allergic rhinitis     Anemia     Benign prostate hyperplasia     Depression     Diabetes mellitus     Hypertension     Hyperuricemia     Osteoarthritis     Sleep apnea        Allergies:    Patient has no known allergies.      Past Surgical History:   Procedure Laterality Date    COLONOSCOPY N/A 5/8/2023    Procedure: COLONOSCOPY WITH POLYPECTOMY AND BIOPSY AND TATTOO;  Surgeon: Eileen Boggs MD;  Location: [facility] ENDOSCOPY;  Service: Gastroenterology;  Laterality: N/A;    ENDOSCOPY N/A 5/8/2023    Procedure: ESOPHAGOGASTRODUODENOSCOPY WITH BIOPSY;  Surgeon: Eileen Boggs MD;  Location: [facility] ENDOSCOPY;  Service: Gastroenterology;  Laterality: N/A;    KNEE SURGERY Left     ROTATOR CUFF REPAIR Left     WRIST SURGERY Right          Social History     Socioeconomic History    Marital status:    Tobacco Use    Smoking status: Never    Smokeless tobacco: Never   Vaping Use    Vaping Use: Never used   Substance and Sexual Activity    Alcohol use: Yes     Comment: occasional    Drug use: No    Sexual activity: Defer         Family History   Problem Relation Age of Onset    Asthma Mother     Diabetes Mother     Asthma Father     Diabetes Father     Heart disease Father     Hypertension Father     Stroke Father     Colon cancer Neg Hx        Objective  Physical Exam:  /82   Pulse 100   Temp 98.1 øF (36.7 øC) (Oral)   Resp 17   Ht 175.3 cm (69\")   Wt 122 kg (270 lb)   SpO2 97%   BMI 39.87 kg/mý      Physical Exam  Vitals and nursing note reviewed.   Constitutional:       General: He is not in acute distress.     Appearance: He is well-developed. He is obese. He is not ill-appearing, toxic-appearing or diaphoretic.   HENT:      Head: Normocephalic and atraumatic.   Eyes:      Extraocular Movements: Extraocular movements intact.   Cardiovascular:      Rate and " Rhythm: Normal rate and regular rhythm.   Pulmonary:      Effort: Pulmonary effort is normal.      Breath sounds: Normal breath sounds.   Abdominal:      General: Abdomen is flat. Bowel sounds are normal.      Palpations: Abdomen is soft.      Tenderness:  in the right upper quadrant   Skin:     General: Skin is warm and dry.   Neurological:      General: No focal deficit present.      Mental Status: He is alert.   Psychiatric:         Mood and Affect: Mood normal.         Behavior: Behavior normal.         Procedures    ED Course:    ED Course as of 08/03/23 1355   Thu Aug 03, 2023   1344 WBC, UA(!): 13-20 [TM]   1344 Bacteria, UA(!): 2+ [TM]   1344 HS Troponin T: 8 [TM]   1346 proBNP: <36.0 [TM]   1346 HS Troponin T: 8 [TM]      ED Course User Index  [TM] Tomas Castellano PA-C       Lab Results (last 24 hours)       Procedure Component Value Units Date/Time    CBC & Differential [[id number]]  (Abnormal) Collected: 08/03/23 1122    Specimen: Blood Updated: 08/03/23 1154    Narrative:      The following orders were created for panel order CBC & Differential.  Procedure                               Abnormality         Status                     ---------                               -----------         ------                     CBC Auto Differential[[id number]]        Abnormal            Final result               Scan Slide[[id number]]                                       Final result                 Please view results for these tests on the individual orders.    Comprehensive Metabolic Panel [[id number]]  (Abnormal) Collected: 08/03/23 1122    Specimen: Blood Updated: 08/03/23 1146     Glucose 98 mg/dL      BUN 16 mg/dL      Creatinine 0.99 mg/dL      Sodium 135 mmol/L      Potassium 4.5 mmol/L      Chloride 101 mmol/L      CO2 22.6 mmol/L      Calcium 8.9 mg/dL      Total Protein 6.9 g/dL      Albumin 3.6 g/dL      ALT (SGPT) 27 U/L      AST (SGOT) 29 U/L      Alkaline Phosphatase 122 U/L      Total  Bilirubin 0.6 mg/dL      Globulin 3.3 gm/dL      A/G Ratio 1.1 g/dL      BUN/Creatinine Ratio 16.2     Anion Gap 11.4 mmol/L      eGFR 91.1 mL/min/1.73     Narrative:      GFR Normal >60  Chronic Kidney Disease <60  Kidney Failure <15      Lipase [[id number]]  (Abnormal) Collected: 08/03/23 1122    Specimen: Blood Updated: 08/03/23 1146     Lipase 99 U/L     Lactic Acid, Plasma [[id number]]  (Normal) Collected: 08/03/23 1122    Specimen: Blood Updated: 08/03/23 1144     Lactate 1.5 mmol/L     CBC Auto Differential [[id number]]  (Abnormal) Collected: 08/03/23 1122    Specimen: Blood Updated: 08/03/23 1136     WBC 9.83 10*3/mm3      RBC 4.70 10*6/mm3      Hemoglobin 13.0 g/dL      Hematocrit 39.3 %      MCV 83.6 fL      MCH 27.7 pg      MCHC 33.1 g/dL      RDW 21.3 %      RDW-SD 62.2 fl      MPV 10.2 fL      Platelets 174 10*3/mm3      Neutrophil % 66.4 %      Lymphocyte % 20.7 %      Monocyte % 10.5 %      Eosinophil % 1.4 %      Basophil % 0.4 %      Immature Grans % 0.6 %      Neutrophils, Absolute 6.53 10*3/mm3      Lymphocytes, Absolute 2.03 10*3/mm3      Monocytes, Absolute 1.03 10*3/mm3      Eosinophils, Absolute 0.14 10*3/mm3      Basophils, Absolute 0.04 10*3/mm3      Immature Grans, Absolute 0.06 10*3/mm3      nRBC 0.0 /100 WBC     Scan Slide [[id number]] Collected: 08/03/23 1122    Specimen: Blood Updated: 08/03/23 1154     Anisocytosis Slight/1+     WBC Morphology Normal     Platelet Morphology Normal    BNP [[id number]]  (Normal) Collected: 08/03/23 1122    Specimen: Blood Updated: 08/03/23 1346     proBNP <36.0 pg/mL     Narrative:      Among patients with dyspnea, NT-proBNP is highly sensitive for the detection of acute congestive heart failure. In addition NT-proBNP of <300 pg/ml effectively rules out acute congestive heart failure with 99% negative predictive value.      Single High Sensitivity Troponin T [[id number]]  (Normal) Collected: 08/03/23 1122    Specimen: Blood Updated: 08/03/23 1343     HS  Troponin T 8 ng/L     Narrative:      High Sensitive Troponin T Reference Range:  <10.0 ng/L- Negative Female for AMI  <15.0 ng/L- Negative Male for AMI  >=10 - Abnormal Female indicating possible myocardial injury.  >=15 - Abnormal Male indicating possible myocardial injury.   Clinicians would have to utilize clinical acumen, EKG, Troponin, and serial changes to determine if it is an Acute Myocardial Infarction or myocardial injury due to an underlying chronic condition.         Heparin Anti-Xa [[id number]] Collected: 08/03/23 1122    Specimen: Blood Updated: 08/03/23 1349    Protime-INR [[id number]]  (Normal) Collected: 08/03/23 1122    Specimen: Blood Updated: 08/03/23 1339     Protime 14.1 Seconds      INR 1.04    Narrative:      Suggested INR therapeutic range for stable oral anticoagulant therapy:    Low Intensity therapy:   1.5-2.0  Moderate Intensity therapy:   2.0-3.0  High Intensity therapy:   2.5-4.0    aPTT [[id number]]  (Abnormal) Collected: 08/03/23 1122    Specimen: Blood Updated: 08/03/23 1339     PTT 32.8 seconds     Urinalysis With Microscopic If Indicated (No Culture) - Urine, Clean Catch [[id number]]  (Abnormal) Collected: 08/03/23 1123    Specimen: Urine, Clean Catch Updated: 08/03/23 1136     Color, UA Yellow     Appearance, UA Clear     pH, UA 6.0     Specific Gravity, UA 1.016     Glucose, UA Negative     Ketones, UA Negative     Bilirubin, UA Negative     Blood, UA Negative     Protein, UA Negative     Leuk Esterase, UA Moderate (2+)     Nitrite, UA Negative     Urobilinogen, UA 0.2 E.U./dL    Urinalysis, Microscopic Only - Urine, Clean Catch [[id number]]  (Abnormal) Collected: 08/03/23 1123    Specimen: Urine, Clean Catch Updated: 08/03/23 1142     RBC, UA None Seen /HPF      WBC, UA 13-20 /HPF      Bacteria, UA 2+ /HPF      Squamous Epithelial Cells, UA None Seen /HPF      Hyaline Casts, UA None Seen /LPF      Methodology Manual Light Microscopy             CT Abdomen Pelvis With  Contrast    Result Date: 8/3/2023  CT SCAN OF THE CHEST, ABDOMEN AND PELVIS WITH CONTRAST    8/3/2023 1:17 PM  HISTORY: right flank pain .  PROCEDURE: Axial CT images were obtained from the lung apex to the pubic symphysis following IV contrast administration. Coronal and sagittal reformatted images were generated from the axial data set and provided for interpretation. This study was performed with techniques to keep radiation doses as low as reasonably achievable, (ALARA). Individualized dose reduction techniques using automated exposure control or adjustment of mA and/or kV according to the patient size were employed.  COMPARISON: CT abdomen and pelvis dated 2/4/2016.  FINDINGS:  CHEST: Lungs/Pleura: Central airways are patent. Right lower lobe large peripheral wedge-shaped opacity, likely a pulmonary infarct. There is a 5 mm pulmonary nodule in the left lower lobe. There are a few tiny pulmonary nodules in the anterior right upper lobe. No pneumothorax or pleural effusion.  Vessels: There are multiple large intraluminal filling defects within the right lower lobe lobar, segmental and subsegmental pulmonary arteries, compatible with acute pulmonary embolism. There are few scattered areas of left lower lobe subsegmental filling defects, compatible with acute pulmonary emboli. Thoracic aorta is normal in caliber. Coronary artery disease is present.  Heart/mediastinum: Heart is normal in size. There is evidence of right heart strain with reversal of the intraventricular septum, right ventricle is measuring approximately 40 mm in diameter left ventricle is measuring approximately 44 mm in diameter (series 5, image 60). No pericardial effusion.  Lymph nodes: No pathologically enlarged thoracic lymph nodes. Right hilar lymphadenopathy, likely reactive/inflammatory. Partially calcified subcarinal and left hilar and right hilar lymph nodes.  Chest wall: No acute findings right chest wall. Port-A-Cath tip in the SVC.   Bones: No acute fracture. No aggressive osseous sclerotic or lucent lesions.  ABDOMEN/PELVIS: Liver, gallbladder and bile ducts: The liver demonstrates innumerable hypodense lesions, compatible with hepatic steatosis, largest lesion in the right hemiliver measuring up to 5 cm. Unremarkable gallbladder. No biliary ductal dilatation.  Adrenal glands: There is indeterminate 13 mm right adrenal nodule. There is fat-containing 2.3 cm left adrenal nodule, probable adrenal myelolipoma.  Kidneys, ureters and urinary bladder: No suspicious renal lesions. Right nephrolithiasis with few stones in the right kidney measuring up to 6 mm. There are few stones in the left kidney midpole. No hydronephrosis. Unremarkable urinary bladder.  Spleen: The spleen is normal in size.  Pancreas: The pancreas is unremarkable.  Gastrointestinal system and mesentery: There is no evidence of bowel obstruction. The appendix is visualized and unremarkable. No significant mesenteric inflammation. No discrete colonic mass is identified. Moderate amount of colonic stool burden.  Lymph nodes: No pathologically enlarged abdominal or pelvic lymph nodes are present.  Vessels: The abdominal aorta is normal in caliber. The celiac trunk, superior mesenteric artery, inferior mesenteric artery and their branch vessels appear grossly patent. The superior mesenteric vein, splenic vein and main portal veins are patent. The inferior vena cava is unremarkable.  Peritoneum: No free intraperitoneal fluid or pneumoperitoneum.  Pelvic viscera: No acute findings.  Body wall: No acute findings. No significant body wall hernias.  Bones: No acute fracture. There is a sclerotic focus in the left iliac bone, likely a bony island or osseous metastasis. No aggressive osseous sclerotic or lucent lesions. Moderate multilevel degenerative changes of the lumbar spine.      Impression: There are multiple large intraluminal filling defects in the right lower lobe lobar, segmental  and subsegmental pulmonary arteries, consistent with acute pulmonary embolism. Right lower lobe large area of wedge-shaped opacity, compatible with pulmonary infarct. There are few subsegmental pulmonary arterial filling defects in the left lower lobe, compatible with acute pulmonary emboli. There is evidence of right heart strain with reversal of interventricular septum. Correlate clinically.  There is a 5 mm pulmonary nodule in the left lower lobe. Few tiny pulmonary nodules in the right upper lobe. These nodules are indeterminate, metastasis cannot be excluded. Correlate clinically and consider follow-up imaging.  Innumerable hypodense hepatic lesions, compatible with metastases. Unknown primary.  Indeterminate 13 mm right adrenal nodule, concerning for metastasis.  No definite colonic mass is identified, suspected. Recommended colonoscopy evaluation if not already performed.  Nonobstructing bilateral nephrolithiasis.    These findings were discussed with JENELLE TORRES PA-C on 8/3/2023 1:19 PM by EDUARDO Grey, over the phone.  Images personally reviewed, interpreted and dictated by EDUARDO Grey.          This report was signed and finalized on 8/3/2023 1:28 PM by EDUARDO Grey.      CT Angiogram Chest Pulmonary Embolism    Result Date: 8/3/2023  CT SCAN OF THE CHEST, ABDOMEN AND PELVIS WITH CONTRAST    8/3/2023 1:17 PM  HISTORY: right flank pain .  PROCEDURE: Axial CT images were obtained from the lung apex to the pubic symphysis following IV contrast administration. Coronal and sagittal reformatted images were generated from the axial data set and provided for interpretation. This study was performed with techniques to keep radiation doses as low as reasonably achievable, (ALARA). Individualized dose reduction techniques using automated exposure control or adjustment of mA and/or kV according to the patient size were employed.  COMPARISON: CT abdomen and pelvis dated  2/4/2016.  FINDINGS:  CHEST: Lungs/Pleura: Central airways are patent. Right lower lobe large peripheral wedge-shaped opacity, likely a pulmonary infarct. There is a 5 mm pulmonary nodule in the left lower lobe. There are a few tiny pulmonary nodules in the anterior right upper lobe. No pneumothorax or pleural effusion.  Vessels: There are multiple large intraluminal filling defects within the right lower lobe lobar, segmental and subsegmental pulmonary arteries, compatible with acute pulmonary embolism. There are few scattered areas of left lower lobe subsegmental filling defects, compatible with acute pulmonary emboli. Thoracic aorta is normal in caliber. Coronary artery disease is present.  Heart/mediastinum: Heart is normal in size. There is evidence of right heart strain with reversal of the intraventricular septum, right ventricle is measuring approximately 40 mm in diameter left ventricle is measuring approximately 44 mm in diameter (series 5, image 60). No pericardial effusion.  Lymph nodes: No pathologically enlarged thoracic lymph nodes. Right hilar lymphadenopathy, likely reactive/inflammatory. Partially calcified subcarinal and left hilar and right hilar lymph nodes.  Chest wall: No acute findings right chest wall. Port-A-Cath tip in the SVC.  Bones: No acute fracture. No aggressive osseous sclerotic or lucent lesions.  ABDOMEN/PELVIS: Liver, gallbladder and bile ducts: The liver demonstrates innumerable hypodense lesions, compatible with hepatic steatosis, largest lesion in the right hemiliver measuring up to 5 cm. Unremarkable gallbladder. No biliary ductal dilatation.  Adrenal glands: There is indeterminate 13 mm right adrenal nodule. There is fat-containing 2.3 cm left adrenal nodule, probable adrenal myelolipoma.  Kidneys, ureters and urinary bladder: No suspicious renal lesions. Right nephrolithiasis with few stones in the right kidney measuring up to 6 mm. There are few stones in the left kidney  midpole. No hydronephrosis. Unremarkable urinary bladder.  Spleen: The spleen is normal in size.  Pancreas: The pancreas is unremarkable.  Gastrointestinal system and mesentery: There is no evidence of bowel obstruction. The appendix is visualized and unremarkable. No significant mesenteric inflammation. No discrete colonic mass is identified. Moderate amount of colonic stool burden.  Lymph nodes: No pathologically enlarged abdominal or pelvic lymph nodes are present.  Vessels: The abdominal aorta is normal in caliber. The celiac trunk, superior mesenteric artery, inferior mesenteric artery and their branch vessels appear grossly patent. The superior mesenteric vein, splenic vein and main portal veins are patent. The inferior vena cava is unremarkable.  Peritoneum: No free intraperitoneal fluid or pneumoperitoneum.  Pelvic viscera: No acute findings.  Body wall: No acute findings. No significant body wall hernias.  Bones: No acute fracture. There is a sclerotic focus in the left iliac bone, likely a bony island or osseous metastasis. No aggressive osseous sclerotic or lucent lesions. Moderate multilevel degenerative changes of the lumbar spine.      Impression: There are multiple large intraluminal filling defects in the right lower lobe lobar, segmental and subsegmental pulmonary arteries, consistent with acute pulmonary embolism. Right lower lobe large area of wedge-shaped opacity, compatible with pulmonary infarct. There are few subsegmental pulmonary arterial filling defects in the left lower lobe, compatible with acute pulmonary emboli. There is evidence of right heart strain with reversal of interventricular septum. Correlate clinically.  There is a 5 mm pulmonary nodule in the left lower lobe. Few tiny pulmonary nodules in the right upper lobe. These nodules are indeterminate, metastasis cannot be excluded. Correlate clinically and consider follow-up imaging.  Innumerable hypodense hepatic lesions, compatible  with metastases. Unknown primary.  Indeterminate 13 mm right adrenal nodule, concerning for metastasis.  No definite colonic mass is identified, suspected. Recommended colonoscopy evaluation if not already performed.  Nonobstructing bilateral nephrolithiasis.    These findings were discussed with JENELLE TORRES PA-C on 8/3/2023 1:19 PM by EDUARDO Grey, over the phone.  Images personally reviewed, interpreted and dictated by EDUARDO Grey.          This report was signed and finalized on 8/3/2023 1:28 PM by EDUARDO Grey.          Medical Decision Making  Amount and/or Complexity of Data Reviewed  Labs: ordered. Decision-making details documented in ED Course.  Radiology: ordered.    Risk  OTC drugs.  Prescription drug management.        Ladarius Jones is a 52 y.o. male who presents to the emergency department for evaluation of right upper quadrant/right flank/right lung pain with some shortness of breath    Differential diagnosis includes PE, cholecystitis, ureteral stone, UTI, pyelonephritis among other etiologies.    CBC, CMP, troponin, BNP, ordered for further evaluation of the patient's presentation.    Chart review if available included outside testing, previous visits, prior labs, prior imaging, available notes from prior evaluations or visits with specialists, medication list, allergies, past medical history, past surgical history when applicable.    Patient was treated with heparin bolus and drip    Patient has a right lower lobe pulmonary infarct with PE, more tachypneic and short of breath with any exertion here.  Given cancer history will place on heparin.  Dr. Vásquez, graciously except the patient for admission    Plan for disposition is admission to the hospital.  Patient/family comfortable with and understanding of the plan.      Final diagnoses:   Acute pulmonary embolism with acute cor pulmonale, unspecified pulmonary embolism type   Pulmonary infarct           Tomas Castellano PA-C  08/03/23 1355      Electronically signed by Nikia Ramsey MD at 08/03/23 1622       Vital Signs (last day)       Date/Time Temp Temp src Pulse Resp BP Patient Position SpO2    08/04/23 0721 98 (36.7) Oral 77 18 119/58 Lying 96    08/04/23 0404 98.2 (36.8) Oral 85 16 102/59 Lying 98    08/03/23 2329 98.4 (36.9) Oral 91 18 103/57 Sitting 96    08/03/23 1932 99.6 (37.6) Oral 104 20 152/97 Sitting 95    08/03/23 1731 -- -- 97 -- -- -- 96    08/03/23 1518 -- -- 97 -- -- -- 96    08/03/23 1456 98.1 (36.7) Oral -- 18 146/80 Lying 98    08/03/23 1353 -- -- 88 -- 137/78 -- 98    08/03/23 1336 -- -- 100 -- -- -- 97    08/03/23 1301 -- -- -- -- -- -- 97    08/03/23 1254 -- -- -- -- 145/82 -- --    08/03/23 1154 -- -- -- -- 113/63 -- 98    08/03/23 1124 -- -- -- -- 138/78 -- 97    08/03/23 1034 98.1 (36.7) Oral 100 17 137/79 Sitting 97          Current Facility-Administered Medications   Medication Dose Route Frequency Provider Last Rate Last Admin    allopurinol (ZYLOPRIM) tablet 300 mg  300 mg Oral Daily Michelle Guillen APRN   300 mg at 08/04/23 0916    apixaban (ELIQUIS) tablet 10 mg  10 mg Oral Q12H Michelle Guillen APRN   10 mg at 08/04/23 0916    Followed by    [START ON 8/11/2023] apixaban (ELIQUIS) tablet 5 mg  5 mg Oral Q12H Michelle Guillen APRN        azelastine (ASTELIN) nasal spray 1 spray  1 spray Each Nare Daily Michelle Guillen APRN   1 spray at 08/04/23 0924    sennosides-docusate (PERICOLACE) 8.6-50 MG per tablet 2 tablet  2 tablet Oral BID Michelle Guillen APRN   2 tablet at 08/04/23 0917    And    polyethylene glycol (MIRALAX) packet 17 g  17 g Oral Daily PRN Michelle Guillen APRN        And    bisacodyl (DULCOLAX) EC tablet 5 mg  5 mg Oral Daily PRN Michelle Guillen APRN        And    bisacodyl (DULCOLAX) suppository 10 mg  10 mg Rectal Daily PRN Michelle Guillen APRN        buPROPion XL (WELLBUTRIN XL) 24 hr tablet 300 mg  300 mg Oral  Daily Michelle Guillen APRN   300 mg at 08/04/23 0916    Calcium Replacement - Follow Nurse / BPA Driven Protocol   Does not apply PRN Michelle Guillen, APRN        fluticasone (FLONASE) 50 MCG/ACT nasal spray 1 spray  1 spray Each Nare Daily Michelle Guillen, APRN   1 spray at 08/04/23 0924    lisinopril (PRINIVIL,ZESTRIL) tablet 20 mg  20 mg Oral Q24H Michelle Guillen, APRN   20 mg at 08/04/23 0917    And    hydroCHLOROthiazide (HYDRODIURIL) tablet 25 mg  25 mg Oral Q24H Michelle Guillen, APRN   25 mg at 08/04/23 0916    levothyroxine (SYNTHROID, LEVOTHROID) tablet 50 mcg  50 mcg Oral QAM Michelle Guillen APRN   50 mcg at 08/04/23 0618    LORazepam (ATIVAN) tablet 0.5 mg  0.5 mg Oral Q8H PRN Michelle Guillen APRN        Magnesium Standard Dose Replacement - Follow Nurse / BPA Driven Protocol   Does not apply PRN Michelle Guillen APRRIMA        melatonin tablet 5 mg  5 mg Oral Nightly PRN Michelle Guillen APRN        morphine injection 4 mg  4 mg Intravenous Q3H PRN Michelle Guillen APRN   4 mg at 08/03/23 1745    And    naloxone (NARCAN) injection 0.4 mg  0.4 mg Intravenous Q5 Min PRN Michelle Guillen APRN        nitroglycerin (NITROSTAT) SL tablet 0.4 mg  0.4 mg Sublingual Q5 Min PRN Michelle Guillen, APRN        ondansetron (ZOFRAN) tablet 4 mg  4 mg Oral Q6H PRN Michelle Guillen, APRN        Or    ondansetron (ZOFRAN) injection 4 mg  4 mg Intravenous Q6H PRN Michelle Guillen, APRN        oxyCODONE-acetaminophen (PERCOCET) 5-325 MG per tablet 1 tablet  1 tablet Oral Q4H PRN Michelle Guillen, APRN   1 tablet at 08/04/23 0618    pantoprazole (PROTONIX) EC tablet 40 mg  40 mg Oral Daily Michelle Guillen APRN   40 mg at 08/04/23 0916    Phosphorus Replacement - Follow Nurse / BPA Driven Protocol   Does not apply PRN Michelle Guillen, MARTA        Potassium Replacement - Follow Nurse / BPA Driven Protocol   Does not apply PRN Michelle Guillen, APRN        sodium chloride 0.9 %  flush 10 mL  10 mL Intravenous PRN Michelle Guillen, APRN   10 mL at 08/03/23 1746    sodium chloride 0.9 % flush 10 mL  10 mL Intravenous Q12H Michelle Guillen, APRN   10 mL at 08/04/23 0917    sodium chloride 0.9 % flush 10 mL  10 mL Intravenous PRN Michelle Guillen, APRN        sodium chloride 0.9 % infusion 40 mL  40 mL Intravenous PRN Michelle Guillen, APRN        spironolactone (ALDACTONE) tablet 100 mg  100 mg Oral Daily Michelle Guillen, APRN   100 mg at 08/04/23 0916    tamsulosin (FLOMAX) 24 hr capsule 0.4 mg  0.4 mg Oral Daily Michelle Guillen, APRN   0.4 mg at 08/04/23 0916     Lab Results (last 24 hours)       Procedure Component Value Units Date/Time    CBC & Differential [[id number]]  (Abnormal) Collected: 08/04/23 0443    Specimen: Blood Updated: 08/04/23 0532    Narrative:      The following orders were created for panel order CBC & Differential.  Procedure                               Abnormality         Status                     ---------                               -----------         ------                     CBC Auto Differential[[id number]]        Abnormal            Final result               Scan Slide[[id number]]                                       Final result                 Please view results for these tests on the individual orders.    Scan Slide [[id number]] Collected: 08/04/23 0443    Specimen: Blood Updated: 08/04/23 0532     Anisocytosis Mod/2+     WBC Morphology Normal     Platelet Morphology Normal    CBC Auto Differential [[id number]]  (Abnormal) Collected: 08/04/23 0443    Specimen: Blood Updated: 08/04/23 0532     WBC 8.76 10*3/mm3      RBC 3.97 10*6/mm3      Hemoglobin 11.1 g/dL      Hematocrit 34.1 %      MCV 85.9 fL      MCH 28.0 pg      MCHC 32.6 g/dL      RDW 20.9 %      RDW-SD 65.1 fl      MPV 10.6 fL      Platelets 143 10*3/mm3      Neutrophil % 56.5 %      Lymphocyte % 29.2 %      Monocyte % 12.2 %      Eosinophil % 1.0 %      Basophil % 0.5 %       Immature Grans % 0.6 %      Neutrophils, Absolute 4.95 10*3/mm3      Lymphocytes, Absolute 2.56 10*3/mm3      Monocytes, Absolute 1.07 10*3/mm3      Eosinophils, Absolute 0.09 10*3/mm3      Basophils, Absolute 0.04 10*3/mm3      Immature Grans, Absolute 0.05 10*3/mm3      nRBC 0.0 /100 WBC     Basic Metabolic Panel [[id number]]  (Abnormal) Collected: 08/04/23 0443    Specimen: Blood Updated: 08/04/23 0522     Glucose 101 mg/dL      BUN 16 mg/dL      Creatinine 1.05 mg/dL      Sodium 134 mmol/L      Potassium 4.4 mmol/L      Chloride 100 mmol/L      CO2 25.4 mmol/L      Calcium 8.4 mg/dL      BUN/Creatinine Ratio 15.2     Anion Gap 8.6 mmol/L      eGFR 84.9 mL/min/1.73     Narrative:      GFR Normal >60  Chronic Kidney Disease <60  Kidney Failure <15      Heparin Anti-Xa [[id number]]  (Normal) Collected: 08/04/23 0443    Specimen: Blood Updated: 08/04/23 0517     Heparin Anti-Xa (UFH) 0.46 IU/ml     Heparin Anti-Xa [[id number]]  (Abnormal) Collected: 08/03/23 2035    Specimen: Blood Updated: 08/03/23 2128     Heparin Anti-Xa (UFH) 0.75 IU/ml     Heparin Anti-Xa [[id number]]  (Abnormal) Collected: 08/03/23 1122    Specimen: Blood Updated: 08/03/23 1400     Heparin Anti-Xa (UFH) 0.10 IU/ml     BNP [[id number]]  (Normal) Collected: 08/03/23 1122    Specimen: Blood Updated: 08/03/23 1346     proBNP <36.0 pg/mL     Narrative:      Among patients with dyspnea, NT-proBNP is highly sensitive for the detection of acute congestive heart failure. In addition NT-proBNP of <300 pg/ml effectively rules out acute congestive heart failure with 99% negative predictive value.      Single High Sensitivity Troponin T [[id number]]  (Normal) Collected: 08/03/23 1122    Specimen: Blood Updated: 08/03/23 1343     HS Troponin T 8 ng/L     Narrative:      High Sensitive Troponin T Reference Range:  <10.0 ng/L- Negative Female for AMI  <15.0 ng/L- Negative Male for AMI  >=10 - Abnormal Female indicating possible myocardial injury.  >=15 - Abnormal  Male indicating possible myocardial injury.   Clinicians would have to utilize clinical acumen, EKG, Troponin, and serial changes to determine if it is an Acute Myocardial Infarction or myocardial injury due to an underlying chronic condition.         Protime-INR [[id number]]  (Normal) Collected: 08/03/23 1122    Specimen: Blood Updated: 08/03/23 1339     Protime 14.1 Seconds      INR 1.04    Narrative:      Suggested INR therapeutic range for stable oral anticoagulant therapy:    Low Intensity therapy:   1.5-2.0  Moderate Intensity therapy:   2.0-3.0  High Intensity therapy:   2.5-4.0    aPTT [[id number]]  (Abnormal) Collected: 08/03/23 1122    Specimen: Blood Updated: 08/03/23 1339     PTT 32.8 seconds     [place] Draw [[id number]] Collected: 08/03/23 1122    Specimen: Blood Updated: 08/03/23 1230    Narrative:      The following orders were created for panel order [place] [facility].  Procedure                               Abnormality         Status                     ---------                               -----------         ------                     Green Top (Gel)[[id number]]                                  Final result               Lavender Top[[id number]]                                     Final result               Gold Top - SST[[id number]]                                   Final result               Light Blue Top[[id number]]                                   Final result                 Please view results for these tests on the individual orders.    Light Blue Top [[id number]] Collected: 08/03/23 1122    Specimen: Blood Updated: 08/03/23 1230     Extra Tube Hold for add-ons.     Comment: Auto resulted       Green Top (Gel) [[id number]] Collected: 08/03/23 1122    Specimen: Blood Updated: 08/03/23 1230     Extra Tube Hold for add-ons.     Comment: Auto resulted.       Lavender Top [[id number]] Collected: 08/03/23 1122    Specimen: Blood Updated: 08/03/23 1230     Extra Tube hold for add-on     Comment: Auto  resulted       Gold Top - [facility] [[id number]] Collected: 08/03/23 1122    Specimen: Blood Updated: 08/03/23 1230     Extra Tube Hold for add-ons.     Comment: Auto resulted.       CBC & Differential [[id number]]  (Abnormal) Collected: 08/03/23 1122    Specimen: Blood Updated: 08/03/23 1154    Narrative:      The following orders were created for panel order CBC & Differential.  Procedure                               Abnormality         Status                     ---------                               -----------         ------                     CBC Auto Differential[[id number]]        Abnormal            Final result               Scan Slide[[id number]]                                       Final result                 Please view results for these tests on the individual orders.    Scan Slide [[id number]] Collected: 08/03/23 1122    Specimen: Blood Updated: 08/03/23 1154     Anisocytosis Slight/1+     WBC Morphology Normal     Platelet Morphology Normal    Lipase [[id number]]  (Abnormal) Collected: 08/03/23 1122    Specimen: Blood Updated: 08/03/23 1146     Lipase 99 U/L     Comprehensive Metabolic Panel [[id number]]  (Abnormal) Collected: 08/03/23 1122    Specimen: Blood Updated: 08/03/23 1146     Glucose 98 mg/dL      BUN 16 mg/dL      Creatinine 0.99 mg/dL      Sodium 135 mmol/L      Potassium 4.5 mmol/L      Chloride 101 mmol/L      CO2 22.6 mmol/L      Calcium 8.9 mg/dL      Total Protein 6.9 g/dL      Albumin 3.6 g/dL      ALT (SGPT) 27 U/L      AST (SGOT) 29 U/L      Alkaline Phosphatase 122 U/L      Total Bilirubin 0.6 mg/dL      Globulin 3.3 gm/dL      A/G Ratio 1.1 g/dL      BUN/Creatinine Ratio 16.2     Anion Gap 11.4 mmol/L      eGFR 91.1 mL/min/1.73     Narrative:      GFR Normal >60  Chronic Kidney Disease <60  Kidney Failure <15      Lactic Acid, Plasma [[id number]]  (Normal) Collected: 08/03/23 1122    Specimen: Blood Updated: 08/03/23 1144     Lactate 1.5 mmol/L     Urinalysis, Microscopic Only - Urine,  Clean Catch [[id number]]  (Abnormal) Collected: 08/03/23 1123    Specimen: Urine, Clean Catch Updated: 08/03/23 1142     RBC, UA None Seen /HPF      WBC, UA 13-20 /HPF      Bacteria, UA 2+ /HPF      Squamous Epithelial Cells, UA None Seen /HPF      Hyaline Casts, UA None Seen /LPF      Methodology Manual Light Microscopy    Urinalysis With Microscopic If Indicated (No Culture) - Urine, Clean Catch [[id number]]  (Abnormal) Collected: 08/03/23 1123    Specimen: Urine, Clean Catch Updated: 08/03/23 1136     Color, UA Yellow     Appearance, UA Clear     pH, UA 6.0     Specific Gravity, UA 1.016     Glucose, UA Negative     Ketones, UA Negative     Bilirubin, UA Negative     Blood, UA Negative     Protein, UA Negative     Leuk Esterase, UA Moderate (2+)     Nitrite, UA Negative     Urobilinogen, UA 0.2 E.U./dL    CBC Auto Differential [[id number]]  (Abnormal) Collected: 08/03/23 1122    Specimen: Blood Updated: 08/03/23 1136     WBC 9.83 10*3/mm3      RBC 4.70 10*6/mm3      Hemoglobin 13.0 g/dL      Hematocrit 39.3 %      MCV 83.6 fL      MCH 27.7 pg      MCHC 33.1 g/dL      RDW 21.3 %      RDW-SD 62.2 fl      MPV 10.2 fL      Platelets 174 10*3/mm3      Neutrophil % 66.4 %      Lymphocyte % 20.7 %      Monocyte % 10.5 %      Eosinophil % 1.4 %      Basophil % 0.4 %      Immature Grans % 0.6 %      Neutrophils, Absolute 6.53 10*3/mm3      Lymphocytes, Absolute 2.03 10*3/mm3      Monocytes, Absolute 1.03 10*3/mm3      Eosinophils, Absolute 0.14 10*3/mm3      Basophils, Absolute 0.04 10*3/mm3      Immature Grans, Absolute 0.06 10*3/mm3      nRBC 0.0 /100 WBC           Imaging Results (Last 24 Hours)       Procedure Component Value Units Date/Time    US Venous Doppler Lower Extremity Bilateral (duplex) [[id number]] Collected: 08/03/23 2026     Updated: 08/03/23 2027    Narrative:      FINAL REPORT    CLINICAL HISTORY:  PE, r/o DVT    FINDINGS:  Right lower extremity:    Grayscale, color Doppler and pulsed Doppler evaluation  revealed normal compressibility, flow and augmentation.  There are normal venous waveforms.    Left lower extremity:    There is noncompressible thrombus occluding the femoral and popliteal veins.      Impression:      Left DVT, above the knee.    Authenticated and Electronically Signed by Viktor Gallegos M.D. on  08/03/2023 08:26:59 PM    CT Abdomen Pelvis With Contrast [[id number]] Collected: 08/03/23 1213     Updated: 08/03/23 1330    Narrative:      CT SCAN OF THE CHEST, ABDOMEN AND PELVIS WITH CONTRAST    8/3/2023 1:17  PM      HISTORY:   right flank pain .     PROCEDURE:   Axial CT images were obtained from the lung apex to the pubic symphysis  following IV contrast administration. Coronal and sagittal reformatted  images were generated from the axial data set and provided for  interpretation. This study was performed with techniques to keep  radiation doses as low as reasonably achievable, (ALARA). Individualized  dose reduction techniques using automated exposure control or adjustment  of mA and/or kV according to the patient size were employed.     COMPARISON:   CT abdomen and pelvis dated 2/4/2016.     FINDINGS:      CHEST:   Lungs/Pleura:  Central airways are patent. Right lower lobe large peripheral  wedge-shaped opacity, likely a pulmonary infarct. There is a 5 mm  pulmonary nodule in the left lower lobe. There are a few tiny pulmonary  nodules in the anterior right upper lobe. No pneumothorax or pleural  effusion.     Vessels:  There are multiple large intraluminal filling defects within the right  lower lobe lobar, segmental and subsegmental pulmonary arteries,  compatible with acute pulmonary embolism. There are few scattered areas  of left lower lobe subsegmental filling defects, compatible with acute  pulmonary emboli. Thoracic aorta is normal in caliber. Coronary artery  disease is present.     Heart/mediastinum:  Heart is normal in size. There is evidence of right heart strain with  reversal of the  intraventricular septum, right ventricle is measuring  approximately 40 mm in diameter left ventricle is measuring  approximately 44 mm in diameter (series 5, image 60). No pericardial  effusion.      Lymph nodes:  No pathologically enlarged thoracic lymph nodes. Right hilar  lymphadenopathy, likely reactive/inflammatory. Partially calcified  subcarinal and left hilar and right hilar lymph nodes.     Chest wall:  No acute findings right chest wall. Port-A-Cath tip in the SVC.     Bones:  No acute fracture. No aggressive osseous sclerotic or lucent lesions.     ABDOMEN/PELVIS:  Liver, gallbladder and bile ducts:  The liver demonstrates innumerable hypodense lesions, compatible with  hepatic steatosis, largest lesion in the right hemiliver measuring up to  5 cm. Unremarkable gallbladder. No biliary ductal dilatation.      Adrenal glands:  There is indeterminate 13 mm right adrenal nodule. There is  fat-containing 2.3 cm left adrenal nodule, probable adrenal myelolipoma.     Kidneys, ureters and urinary bladder:  No suspicious renal lesions. Right nephrolithiasis with few stones in  the right kidney measuring up to 6 mm. There are few stones in the left  kidney midpole. No hydronephrosis. Unremarkable urinary bladder.     Spleen:  The spleen is normal in size.     Pancreas:  The pancreas is unremarkable.      Gastrointestinal system and mesentery:  There is no evidence of bowel obstruction. The appendix is visualized  and unremarkable. No significant mesenteric inflammation. No discrete  colonic mass is identified. Moderate amount of colonic stool burden.     Lymph nodes:  No pathologically enlarged abdominal or pelvic lymph nodes are present.     Vessels:  The abdominal aorta is normal in caliber. The celiac trunk, superior  mesenteric artery, inferior mesenteric artery and their branch vessels  appear grossly patent. The superior mesenteric vein, splenic vein and  main portal veins are patent. The inferior vena cava  is unremarkable.     Peritoneum:  No free intraperitoneal fluid or pneumoperitoneum.     Pelvic viscera:  No acute findings.     Body wall:  No acute findings. No significant body wall hernias.     Bones:  No acute fracture. There is a sclerotic focus in the left iliac bone,  likely a bony island or osseous metastasis. No aggressive osseous  sclerotic or lucent lesions. Moderate multilevel degenerative changes of  the lumbar spine.       Impression:      There are multiple large intraluminal filling defects in the right lower  lobe lobar, segmental and subsegmental pulmonary arteries, consistent  with acute pulmonary embolism. Right lower lobe large area of  wedge-shaped opacity, compatible with pulmonary infarct. There are few  subsegmental pulmonary arterial filling defects in the left lower lobe,  compatible with acute pulmonary emboli. There is evidence of right heart  strain with reversal of interventricular septum. Correlate clinically.     There is a 5 mm pulmonary nodule in the left lower lobe. Few tiny  pulmonary nodules in the right upper lobe. These nodules are  indeterminate, metastasis cannot be excluded. Correlate clinically and  consider follow-up imaging.     Innumerable hypodense hepatic lesions, compatible with metastases.  Unknown primary.     Indeterminate 13 mm right adrenal nodule, concerning for metastasis.     No definite colonic mass is identified, suspected. Recommended  colonoscopy evaluation if not already performed.     Nonobstructing bilateral nephrolithiasis.           These findings were discussed with JENELLE TORRES PA-C on  8/3/2023 1:19 PM by EDUARDO Grey, over the phone.     Images personally reviewed, interpreted and dictated by EDUARDO Grey.                             This report was signed and finalized on 8/3/2023 1:28 PM by EDUARDO Grey.       CT Angiogram Chest Pulmonary Embolism [[id number]] Collected: 08/03/23 1213     Updated:  08/03/23 1330    Narrative:      CT SCAN OF THE CHEST, ABDOMEN AND PELVIS WITH CONTRAST    8/3/2023 1:17  PM      HISTORY:   right flank pain .     PROCEDURE:   Axial CT images were obtained from the lung apex to the pubic symphysis  following IV contrast administration. Coronal and sagittal reformatted  images were generated from the axial data set and provided for  interpretation. This study was performed with techniques to keep  radiation doses as low as reasonably achievable, (ALARA). Individualized  dose reduction techniques using automated exposure control or adjustment  of mA and/or kV according to the patient size were employed.     COMPARISON:   CT abdomen and pelvis dated 2/4/2016.     FINDINGS:      CHEST:   Lungs/Pleura:  Central airways are patent. Right lower lobe large peripheral  wedge-shaped opacity, likely a pulmonary infarct. There is a 5 mm  pulmonary nodule in the left lower lobe. There are a few tiny pulmonary  nodules in the anterior right upper lobe. No pneumothorax or pleural  effusion.     Vessels:  There are multiple large intraluminal filling defects within the right  lower lobe lobar, segmental and subsegmental pulmonary arteries,  compatible with acute pulmonary embolism. There are few scattered areas  of left lower lobe subsegmental filling defects, compatible with acute  pulmonary emboli. Thoracic aorta is normal in caliber. Coronary artery  disease is present.     Heart/mediastinum:  Heart is normal in size. There is evidence of right heart strain with  reversal of the intraventricular septum, right ventricle is measuring  approximately 40 mm in diameter left ventricle is measuring  approximately 44 mm in diameter (series 5, image 60). No pericardial  effusion.      Lymph nodes:  No pathologically enlarged thoracic lymph nodes. Right hilar  lymphadenopathy, likely reactive/inflammatory. Partially calcified  subcarinal and left hilar and right hilar lymph nodes.     Chest wall:  No  acute findings right chest wall. Port-A-Cath tip in the SVC.     Bones:  No acute fracture. No aggressive osseous sclerotic or lucent lesions.     ABDOMEN/PELVIS:  Liver, gallbladder and bile ducts:  The liver demonstrates innumerable hypodense lesions, compatible with  hepatic steatosis, largest lesion in the right hemiliver measuring up to  5 cm. Unremarkable gallbladder. No biliary ductal dilatation.      Adrenal glands:  There is indeterminate 13 mm right adrenal nodule. There is  fat-containing 2.3 cm left adrenal nodule, probable adrenal myelolipoma.     Kidneys, ureters and urinary bladder:  No suspicious renal lesions. Right nephrolithiasis with few stones in  the right kidney measuring up to 6 mm. There are few stones in the left  kidney midpole. No hydronephrosis. Unremarkable urinary bladder.     Spleen:  The spleen is normal in size.     Pancreas:  The pancreas is unremarkable.      Gastrointestinal system and mesentery:  There is no evidence of bowel obstruction. The appendix is visualized  and unremarkable. No significant mesenteric inflammation. No discrete  colonic mass is identified. Moderate amount of colonic stool burden.     Lymph nodes:  No pathologically enlarged abdominal or pelvic lymph nodes are present.     Vessels:  The abdominal aorta is normal in caliber. The celiac trunk, superior  mesenteric artery, inferior mesenteric artery and their branch vessels  appear grossly patent. The superior mesenteric vein, splenic vein and  main portal veins are patent. The inferior vena cava is unremarkable.     Peritoneum:  No free intraperitoneal fluid or pneumoperitoneum.     Pelvic viscera:  No acute findings.     Body wall:  No acute findings. No significant body wall hernias.     Bones:  No acute fracture. There is a sclerotic focus in the left iliac bone,  likely a bony island or osseous metastasis. No aggressive osseous  sclerotic or lucent lesions. Moderate multilevel degenerative changes  of  the lumbar spine.       Impression:      There are multiple large intraluminal filling defects in the right lower  lobe lobar, segmental and subsegmental pulmonary arteries, consistent  with acute pulmonary embolism. Right lower lobe large area of  wedge-shaped opacity, compatible with pulmonary infarct. There are few  subsegmental pulmonary arterial filling defects in the left lower lobe,  compatible with acute pulmonary emboli. There is evidence of right heart  strain with reversal of interventricular septum. Correlate clinically.     There is a 5 mm pulmonary nodule in the left lower lobe. Few tiny  pulmonary nodules in the right upper lobe. These nodules are  indeterminate, metastasis cannot be excluded. Correlate clinically and  consider follow-up imaging.     Innumerable hypodense hepatic lesions, compatible with metastases.  Unknown primary.     Indeterminate 13 mm right adrenal nodule, concerning for metastasis.     No definite colonic mass is identified, suspected. Recommended  colonoscopy evaluation if not already performed.     Nonobstructing bilateral nephrolithiasis.           These findings were discussed with JENELLE TORRES PA-C on  8/3/2023 1:19 PM by EDUARDO Grey, over the phone.     Images personally reviewed, interpreted and dictated by EDUARDO Grey.                             This report was signed and finalized on 8/3/2023 1:28 PM by EDUARDO Grey.             ECG/EMG Results (last 24 hours)       Procedure Component Value Units Date/Time    ECG 12 Lead Other; PE [[id number]] Resulted: 08/03/23 1359     Updated: 08/03/23 1423    Adult Transthoracic Echo Complete w/ Color, Spectral and Contrast if necessary per protocol [[id number]] Resulted: 08/03/23 1738     Updated: 08/03/23 1740     EF(MOD-bp) 61.3 %      LVIDd 5.0 cm      LVIDs 2.8 cm      IVSd 1.04 cm      LVPWd 1.0 cm      FS 44.9 %      IVS/LVPW 0.78 cm      ESV(cubed) 21.0 ml      EDV(cubed)  125.8 ml      LVOT area 4.5 cm2      LV mass(C)d 231.8 grams      LVOT diam 2.40 cm      EDV(MOD-sp2) 112.0 ml      EDV(MOD-sp4) 137.0 ml      ESV(MOD-sp2) 38.5 ml      ESV(MOD-sp4) 57.6 ml      SV(MOD-sp2) 73.5 ml      SV(MOD-sp4) 79.4 ml      EF(MOD-sp2) 65.6 %      EF(MOD-sp4) 58.0 %      MV E max jeffry 69.2 cm/sec      MV A max jeffry 79.6 cm/sec      MV dec time 0.19 msec      MV E/A 0.87     Pulm A Revs Dur 0.11 sec      LA ESV Index (BP) 21.1 ml/m2      Med Peak E' Jeffry 10.2 cm/sec      Lat Peak E' Jeffry 8.6 cm/sec      Avg E/e' ratio 7.36     SV(LVOT) 78.3 ml      RV Base 4.0 cm      RV Mid 2.38 cm      RV Length 7.1 cm      TAPSE (>1.6) 2.8 cm      RV S' 20.4 cm/sec      LA dimension (2D)  3.7 cm      Pulm Sys Jeffry 58.3 cm/sec      Pulm Stokes Jeffry 33.1 cm/sec      Pulm S/D 1.76     Pulm A Revs Jeffry 34.0 cm/sec      LV V1 max 102.0 cm/sec      LV V1 max PG 4.2 mmHg      LV V1 mean PG 2.00 mmHg      LV V1 VTI 17.3 cm      Ao pk jeffry 128.0 cm/sec      Ao max PG 6.6 mmHg      Ao mean PG 4.0 mmHg      Ao V2 VTI 22.1 cm      TERI(I,D) 3.5 cm2      MV max PG 4.4 mmHg      MV mean PG 2.00 mmHg      MV V2 VTI 17.6 cm      MVA(VTI) 4.4 cm2      RAP systole 3.0 mmHg      RV V1 max PG 2.37 mmHg      RV V1 max 77.0 cm/sec      RV V1 VTI 12.5 cm      PA V2 max 103.0 cm/sec      PA acc time 0.08 sec      Ao root diam 3.2 cm      Echo EF Estimated 64.0 %     Narrative:        Left ventricular systolic function is normal. Estimated left   ventricular EF = 64% Left ventricular ejection fraction appears to be 61 -   65%.    Left ventricular diastolic function was normal.      SCANNED - TELEMETRY   [[id number]] Resulted: 08/03/23     Updated: 08/04/23 0901    SCANNED - TELEMETRY   [[id number]] Resulted: 08/03/23     Updated: 08/04/23 0938          Physician Progress Notes (last 24 hours)  Notes from 08/03/23 1134 through 08/04/23 1134   No notes of this type exist for this encounter.          Consult Notes (last 24 hours)        Valerio  "Lou Cleary MD at 08/03/23 1402          Date of consultation:   August 3, 2023    Requested by:    Hospitalist Service. MARTA Richardson    PCP: Maame Higgins DO    Reason:  PE    History of Present Illness:  52 y.o. male with Stage IV colorectal cancer s/p recent PET scan showing good response per Oncology note and on palliative systemic treatment with FOLFOX/bevacizumab s/p 6 cycles.  He finished his last cycle last week and on Friday his wife removed his \"chemo bomb\" without difficulty.  Later in the day he noticed right-sided lower chest/right upper quadrant pain, but did not really notice significant shortness of breath.  This pain waxed and waned over the weekend, but then became constant.  He did contact his outpatient physician and when they noted the had not gotten better with conservative measures they asked him to come to the emergency room today.  Imaging studies have shown pulmonary emboli as detailed below.    Patient did not really notice much shortness of breath until the last 1 to 2 days.  He noticed even here trying to go to and from the CT scan was very tiring for him.  He is currently requiring a couple liters nasal cannula to maintain his sats.  He denies any hemoptysis, cough, hematuria, bright red blood per rectum or melanotic stools.  States he is not really had much difficulty with that even with his chemotherapy and cancer diagnosis.  He denies any recent long car rides or airplane travel.  States he was relatively active until the pain started becoming a problem over the weekend.  He has no known family history of blood clots.    He is being started on heparin drip.  His BNP and troponin level have been normal.    Pulmonary Consultation was requested for further recommendations.       Review of System: All other review of systems negative except indicated in HPI.      Past Medical History:  Past Medical History:   Diagnosis Date    Allergic rhinitis     Anemia     Benign " "prostate hyperplasia     Depression     Diabetes mellitus     Hypertension     Hyperuricemia     Osteoarthritis     Sleep apnea          Past Surgical History:  Past Surgical History:   Procedure Laterality Date    COLONOSCOPY N/A 5/8/2023    Procedure: COLONOSCOPY WITH POLYPECTOMY AND BIOPSY AND TATTOO;  Surgeon: Eileen Boggs MD;  Location: [facility] ENDOSCOPY;  Service: Gastroenterology;  Laterality: N/A;    ENDOSCOPY N/A 5/8/2023    Procedure: ESOPHAGOGASTRODUODENOSCOPY WITH BIOPSY;  Surgeon: Eileen Boggs MD;  Location: [facility] ENDOSCOPY;  Service: Gastroenterology;  Laterality: N/A;    KNEE SURGERY Left     ROTATOR CUFF REPAIR Left     WRIST SURGERY Right          Family History:  Family History   Problem Relation Age of Onset    Asthma Mother     Diabetes Mother     Asthma Father     Diabetes Father     Heart disease Father     Hypertension Father     Stroke Father     Colon cancer Neg Hx          Social History:  Social History     Socioeconomic History    Marital status:    Tobacco Use    Smoking status: Never    Smokeless tobacco: Never   Vaping Use    Vaping Use: Never used   Substance and Sexual Activity    Alcohol use: Yes     Comment: occasional    Drug use: No    Sexual activity: Defer         Physical Exam:  /82   Pulse 100   Temp 98.1 øF (36.7 øC) (Oral)   Resp 17   Ht 175.3 cm (69\")   Wt 122 kg (270 lb)   SpO2 97%   BMI 39.87 kg/mý   Constitutional:Vital signs reviewed           General:  No respiratory distress noted when speaking in complete sentences  Eyes: Extraocular movement was intact, non-icteric sclera  ENT:No nasal discharge noted. Oropharynx is moist and non-erythematous  Neck: Supple.  No JVD noted.Trachea midline  Cardiovascular: S1 + S2.  Regular rate  Lungs/Respiratory: Good air movement.  Clear to ascultation bilaterally.  Right subcutaneous port in place.  No erythema at site.  No fluctuance noted.  Abdomen/GI: Soft.  Bowel sounds were positive.  " No distension noted.  MSK/Extremities: No significant edema noted. No cyanosis noted. No clubbing noted  Neurologic: Awake, alert and oriented x 3.Able to follow simple commands.  Psychiatric: Normal mood and affect. Does not appear anxious on exam  Skin: No rash noted on visible skin. No excessive bruising noted.      Labs: Reviewed. Pertinent labs were noted.     Results from last 7 days   Lab Units 08/03/23  1122   WBC 10*3/mm3 9.83   HEMOGLOBIN g/dL 13.0   HEMATOCRIT % 39.3   PLATELETS 10*3/mm3 174   NEUTROPHIL % % 66.4   NEUTROS ABS 10*3/mm3 6.53   EOSINOPHIL % % 1.4   EOS ABS 10*3/mm3 0.14   LYMPHOCYTE % % 20.7   LYMPHS ABS 10*3/mm3 2.03       Results from last 7 days   Lab Units 08/03/23  1122   SODIUM mmol/L 135*   POTASSIUM mmol/L 4.5   CHLORIDE mmol/L 101   CO2 mmol/L 22.6   BUN mg/dL 16   CREATININE mg/dL 0.99   CALCIUM mg/dL 8.9   ANION GAP mmol/L 11.4   BILIRUBIN mg/dL 0.6   ALK PHOS U/L 122*   ALT (SGPT) U/L 27   AST (SGOT) U/L 29   GLUCOSE mg/dL 98   TOTAL PROTEIN g/dL 6.9   ALBUMIN g/dL 3.6             Lab Results   Component Value Date    PROBNP <36.0 08/03/2023       Lab Results   Component Value Date    INR 1.04 08/03/2023    INR 1.05 05/02/2023    INR 1.1 06/12/2014       No results found for: PROCALCITO    No results found for: CRP    No results found for: SEDRATE    ABG: No results found for: PHART, WZI6LHQ, PO2ART, HGBBG, I2URSAPE, CFIO2, FCOHB, CARBOXYHGB, FMETHB      Micro: As of August 3, 2023   No results found for: RESPCX  No results found for: BLOODCX  Lab Results   Component Value Date    URINECX >100,000 CFU/mL Mixed Lucinda Isolated 07/11/2023     No results found for: MRSACX  No results found for: MRSAPCR  No results found for: URCX  No components found for: LOWRESPCF  No results found for: THROATCX  No results found for: CULTURES  No components found for: STREPBCX  No results found for: STREPPNEUAG  No results found for: LEGIONELLA  No results found for: MYCOPLASCX  No results found  for: GCCX  No results found for: WOUNDCX  No results found for: BODYFLDCX    No results found for: FLU    No results found for: ADENOVIRUS  No results found for: BJ451D  No results found for: CVHKU1  No results found for: CVNL63  No results found for: CVOC43  No results found for: HUMETPNEVS  No results found for: HURVEV  No results found for: FLUBPCR  No results found for: PARAINFLUE  No results found for: PARAFLUV2  No results found for: PARAFLUV3  No results found for: PARAFLUV4  No results found for: BPERTPCR  No results found for: [id number]  No results found for: CPNEUPCR  No results found for: MPNEUMO  No results found for: FLUAPCR  No results found for: FLUAH3  No results found for: FLUAH1  No results found for: RSV  No results found for: BPARAPCR    COVID 19:  No results found for: COVID19    No results found for: THCURSCR  No results found for: PCPUR  No results found for: COCAINEUR  No results found for: METAMPSCNUR  No results found for: LABOPIASCN  No results found for: AMPHETSCREEN  No results found for: LABBENZSCN  No results found for: TRICYCLICSCN  No results found for: LABMETHSCN  No results found for: BARBITSCNUR  No results found for: OXYCODONESCN  No results found for: PROPOXSCN  No results found for: BUPRENORSCNU  No results found for: ETHANOLMGDL  No results found for: ETOHPCT       heparin, 12.3 Units/kg/hr  Pharmacy to Dose Heparin,         Imaging Study: Latest imaging studies was reviewed personally.   Imaging Results (Last 72 Hours)       Procedure Component Value Units Date/Time    CT Abdomen Pelvis With Contrast [[id number]] Collected: 08/03/23 1213     Updated: 08/03/23 1330    Narrative:      CT SCAN OF THE CHEST, ABDOMEN AND PELVIS WITH CONTRAST    8/3/2023 1:17  PM      HISTORY:   right flank pain .     PROCEDURE:   Axial CT images were obtained from the lung apex to the pubic symphysis  following IV contrast administration. Coronal and sagittal reformatted  images were generated  from the axial data set and provided for  interpretation. This study was performed with techniques to keep  radiation doses as low as reasonably achievable, (ALARA). Individualized  dose reduction techniques using automated exposure control or adjustment  of mA and/or kV according to the patient size were employed.     COMPARISON:   CT abdomen and pelvis dated 2/4/2016.     FINDINGS:      CHEST:   Lungs/Pleura:  Central airways are patent. Right lower lobe large peripheral  wedge-shaped opacity, likely a pulmonary infarct. There is a 5 mm  pulmonary nodule in the left lower lobe. There are a few tiny pulmonary  nodules in the anterior right upper lobe. No pneumothorax or pleural  effusion.     Vessels:  There are multiple large intraluminal filling defects within the right  lower lobe lobar, segmental and subsegmental pulmonary arteries,  compatible with acute pulmonary embolism. There are few scattered areas  of left lower lobe subsegmental filling defects, compatible with acute  pulmonary emboli. Thoracic aorta is normal in caliber. Coronary artery  disease is present.     Heart/mediastinum:  Heart is normal in size. There is evidence of right heart strain with  reversal of the intraventricular septum, right ventricle is measuring  approximately 40 mm in diameter left ventricle is measuring  approximately 44 mm in diameter (series 5, image 60). No pericardial  effusion.      Lymph nodes:  No pathologically enlarged thoracic lymph nodes. Right hilar  lymphadenopathy, likely reactive/inflammatory. Partially calcified  subcarinal and left hilar and right hilar lymph nodes.     Chest wall:  No acute findings right chest wall. Port-A-Cath tip in the SVC.     Bones:  No acute fracture. No aggressive osseous sclerotic or lucent lesions.     ABDOMEN/PELVIS:  Liver, gallbladder and bile ducts:  The liver demonstrates innumerable hypodense lesions, compatible with  hepatic steatosis, largest lesion in the right hemiliver  measuring up to  5 cm. Unremarkable gallbladder. No biliary ductal dilatation.      Adrenal glands:  There is indeterminate 13 mm right adrenal nodule. There is  fat-containing 2.3 cm left adrenal nodule, probable adrenal myelolipoma.     Kidneys, ureters and urinary bladder:  No suspicious renal lesions. Right nephrolithiasis with few stones in  the right kidney measuring up to 6 mm. There are few stones in the left  kidney midpole. No hydronephrosis. Unremarkable urinary bladder.     Spleen:  The spleen is normal in size.     Pancreas:  The pancreas is unremarkable.      Gastrointestinal system and mesentery:  There is no evidence of bowel obstruction. The appendix is visualized  and unremarkable. No significant mesenteric inflammation. No discrete  colonic mass is identified. Moderate amount of colonic stool burden.     Lymph nodes:  No pathologically enlarged abdominal or pelvic lymph nodes are present.     Vessels:  The abdominal aorta is normal in caliber. The celiac trunk, superior  mesenteric artery, inferior mesenteric artery and their branch vessels  appear grossly patent. The superior mesenteric vein, splenic vein and  main portal veins are patent. The inferior vena cava is unremarkable.     Peritoneum:  No free intraperitoneal fluid or pneumoperitoneum.     Pelvic viscera:  No acute findings.     Body wall:  No acute findings. No significant body wall hernias.     Bones:  No acute fracture. There is a sclerotic focus in the left iliac bone,  likely a bony island or osseous metastasis. No aggressive osseous  sclerotic or lucent lesions. Moderate multilevel degenerative changes of  the lumbar spine.       Impression:      There are multiple large intraluminal filling defects in the right lower  lobe lobar, segmental and subsegmental pulmonary arteries, consistent  with acute pulmonary embolism. Right lower lobe large area of  wedge-shaped opacity, compatible with pulmonary infarct. There are  few  subsegmental pulmonary arterial filling defects in the left lower lobe,  compatible with acute pulmonary emboli. There is evidence of right heart  strain with reversal of interventricular septum. Correlate clinically.     There is a 5 mm pulmonary nodule in the left lower lobe. Few tiny  pulmonary nodules in the right upper lobe. These nodules are  indeterminate, metastasis cannot be excluded. Correlate clinically and  consider follow-up imaging.     Innumerable hypodense hepatic lesions, compatible with metastases.  Unknown primary.     Indeterminate 13 mm right adrenal nodule, concerning for metastasis.     No definite colonic mass is identified, suspected. Recommended  colonoscopy evaluation if not already performed.     Nonobstructing bilateral nephrolithiasis.           These findings were discussed with JENELLE TORRES PA-C on  8/3/2023 1:19 PM by EDUARDO rGey, over the phone.     Images personally reviewed, interpreted and dictated by EDUARDO Grey.                             This report was signed and finalized on 8/3/2023 1:28 PM by EDUARDO Grey.       CT Angiogram Chest Pulmonary Embolism [[id number]] Collected: 08/03/23 1213     Updated: 08/03/23 1330    Narrative:      CT SCAN OF THE CHEST, ABDOMEN AND PELVIS WITH CONTRAST    8/3/2023 1:17  PM      HISTORY:   right flank pain .     PROCEDURE:   Axial CT images were obtained from the lung apex to the pubic symphysis  following IV contrast administration. Coronal and sagittal reformatted  images were generated from the axial data set and provided for  interpretation. This study was performed with techniques to keep  radiation doses as low as reasonably achievable, (ALARA). Individualized  dose reduction techniques using automated exposure control or adjustment  of mA and/or kV according to the patient size were employed.     COMPARISON:   CT abdomen and pelvis dated 2/4/2016.     FINDINGS:      CHEST:    Lungs/Pleura:  Central airways are patent. Right lower lobe large peripheral  wedge-shaped opacity, likely a pulmonary infarct. There is a 5 mm  pulmonary nodule in the left lower lobe. There are a few tiny pulmonary  nodules in the anterior right upper lobe. No pneumothorax or pleural  effusion.     Vessels:  There are multiple large intraluminal filling defects within the right  lower lobe lobar, segmental and subsegmental pulmonary arteries,  compatible with acute pulmonary embolism. There are few scattered areas  of left lower lobe subsegmental filling defects, compatible with acute  pulmonary emboli. Thoracic aorta is normal in caliber. Coronary artery  disease is present.     Heart/mediastinum:  Heart is normal in size. There is evidence of right heart strain with  reversal of the intraventricular septum, right ventricle is measuring  approximately 40 mm in diameter left ventricle is measuring  approximately 44 mm in diameter (series 5, image 60). No pericardial  effusion.      Lymph nodes:  No pathologically enlarged thoracic lymph nodes. Right hilar  lymphadenopathy, likely reactive/inflammatory. Partially calcified  subcarinal and left hilar and right hilar lymph nodes.     Chest wall:  No acute findings right chest wall. Port-A-Cath tip in the SVC.     Bones:  No acute fracture. No aggressive osseous sclerotic or lucent lesions.     ABDOMEN/PELVIS:  Liver, gallbladder and bile ducts:  The liver demonstrates innumerable hypodense lesions, compatible with  hepatic steatosis, largest lesion in the right hemiliver measuring up to  5 cm. Unremarkable gallbladder. No biliary ductal dilatation.      Adrenal glands:  There is indeterminate 13 mm right adrenal nodule. There is  fat-containing 2.3 cm left adrenal nodule, probable adrenal myelolipoma.     Kidneys, ureters and urinary bladder:  No suspicious renal lesions. Right nephrolithiasis with few stones in  the right kidney measuring up to 6 mm. There are few  stones in the left  kidney midpole. No hydronephrosis. Unremarkable urinary bladder.     Spleen:  The spleen is normal in size.     Pancreas:  The pancreas is unremarkable.      Gastrointestinal system and mesentery:  There is no evidence of bowel obstruction. The appendix is visualized  and unremarkable. No significant mesenteric inflammation. No discrete  colonic mass is identified. Moderate amount of colonic stool burden.     Lymph nodes:  No pathologically enlarged abdominal or pelvic lymph nodes are present.     Vessels:  The abdominal aorta is normal in caliber. The celiac trunk, superior  mesenteric artery, inferior mesenteric artery and their branch vessels  appear grossly patent. The superior mesenteric vein, splenic vein and  main portal veins are patent. The inferior vena cava is unremarkable.     Peritoneum:  No free intraperitoneal fluid or pneumoperitoneum.     Pelvic viscera:  No acute findings.     Body wall:  No acute findings. No significant body wall hernias.     Bones:  No acute fracture. There is a sclerotic focus in the left iliac bone,  likely a bony island or osseous metastasis. No aggressive osseous  sclerotic or lucent lesions. Moderate multilevel degenerative changes of  the lumbar spine.       Impression:      There are multiple large intraluminal filling defects in the right lower  lobe lobar, segmental and subsegmental pulmonary arteries, consistent  with acute pulmonary embolism. Right lower lobe large area of  wedge-shaped opacity, compatible with pulmonary infarct. There are few  subsegmental pulmonary arterial filling defects in the left lower lobe,  compatible with acute pulmonary emboli. There is evidence of right heart  strain with reversal of interventricular septum. Correlate clinically.     There is a 5 mm pulmonary nodule in the left lower lobe. Few tiny  pulmonary nodules in the right upper lobe. These nodules are  indeterminate, metastasis cannot be excluded. Correlate  clinically and  consider follow-up imaging.     Innumerable hypodense hepatic lesions, compatible with metastases.  Unknown primary.     Indeterminate 13 mm right adrenal nodule, concerning for metastasis.     No definite colonic mass is identified, suspected. Recommended  colonoscopy evaluation if not already performed.     Nonobstructing bilateral nephrolithiasis.           These findings were discussed with JENELLE TORRES PA-C on  8/3/2023 1:19 PM by EDUARDO Grey, over the phone.     Images personally reviewed, interpreted and dictated by EDUARDO Grey.                             This report was signed and finalized on 8/3/2023 1:28 PM by EDUARDO Grey.                 ECHO:   None available for review.        Assessment/plan:  1.  Bilateral pulmonary emboli with right lung infarct  Clinically doing well and has required minimal oxygen supplementation to maintain sats.  Currently on 2 L nasal cannula while was in the room  They have ordered heparin and this is currently being started.  If patient continues to do well clinically, can transition to Eliquis and I would expect will need at least 6 months worth of therapy, but given malignancy will most likely need to be kept on indefinitely.  Discussed risk, benefits and possible complications of treatment including risk of bleeding with patient.  We will need to continue with pain control for right lung infarct and I explained that I expect this to clinically improve over the next few weeks.  Troponin and BNP both normal and no indication of right heart strain based on the normal labs.    2.  Stage IV colon cancer  Currently undergoing palliative treatment  Has follow-up appointment with oncologist next Tuesday for regularly scheduled visit    No family at bedside.  Will order DSM follow-up in 1-2 weeks.     Recommendations were also discussed with the referring provider, Michelle LOZANO.     I would like to thank you for  the opportunity to participate in the care of this patient.  We will communicate changes and recommendations, if and when necessary.      This document was electronically signed by Lou Luo MD on 08/03/23 at 14:02 EDT      Dictated utilizing Dragon dictation.     Electronically signed by Lou Luo MD at 08/03/23 0836

## 2023-08-04 NOTE — PLAN OF CARE
Goal Outcome Evaluation:  Plan of Care Reviewed With: patient        Progress: improving  Outcome Evaluation: Pt has rested comfortably in bed majority of shift. Pt denies pain or SOB. No acute events occured during shift. Will continue to closely monitor. Probable discharge tomorrow.          extension

## 2023-08-05 VITALS
RESPIRATION RATE: 17 BRPM | SYSTOLIC BLOOD PRESSURE: 127 MMHG | WEIGHT: 260.36 LBS | DIASTOLIC BLOOD PRESSURE: 65 MMHG | TEMPERATURE: 98.1 F | OXYGEN SATURATION: 95 % | HEART RATE: 79 BPM | BODY MASS INDEX: 38.56 KG/M2 | HEIGHT: 69 IN

## 2023-08-05 LAB
ANION GAP SERPL CALCULATED.3IONS-SCNC: 10.2 MMOL/L (ref 5–15)
BUN SERPL-MCNC: 19 MG/DL (ref 6–20)
BUN/CREAT SERPL: 16.5 (ref 7–25)
CALCIUM SPEC-SCNC: 8.1 MG/DL (ref 8.6–10.5)
CHLORIDE SERPL-SCNC: 101 MMOL/L (ref 98–107)
CO2 SERPL-SCNC: 23.8 MMOL/L (ref 22–29)
CREAT SERPL-MCNC: 1.15 MG/DL (ref 0.76–1.27)
DEPRECATED RDW RBC AUTO: 62.5 FL (ref 37–54)
EGFRCR SERPLBLD CKD-EPI 2021: 76.1 ML/MIN/1.73
ERYTHROCYTE [DISTWIDTH] IN BLOOD BY AUTOMATED COUNT: 20.6 % (ref 12.3–15.4)
GLUCOSE SERPL-MCNC: 133 MG/DL (ref 65–99)
HCT VFR BLD AUTO: 31.4 % (ref 37.5–51)
HGB BLD-MCNC: 10.4 G/DL (ref 13–17.7)
MCH RBC QN AUTO: 28 PG (ref 26.6–33)
MCHC RBC AUTO-ENTMCNC: 33.1 G/DL (ref 31.5–35.7)
MCV RBC AUTO: 84.6 FL (ref 79–97)
PLATELET # BLD AUTO: 136 10*3/MM3 (ref 140–450)
PMV BLD AUTO: 10.4 FL (ref 6–12)
POTASSIUM SERPL-SCNC: 4.1 MMOL/L (ref 3.5–5.2)
RBC # BLD AUTO: 3.71 10*6/MM3 (ref 4.14–5.8)
SODIUM SERPL-SCNC: 135 MMOL/L (ref 136–145)
WBC NRBC COR # BLD: 6.97 10*3/MM3 (ref 3.4–10.8)

## 2023-08-05 PROCEDURE — 80048 BASIC METABOLIC PNL TOTAL CA: CPT | Performed by: NURSE PRACTITIONER

## 2023-08-05 PROCEDURE — 85027 COMPLETE CBC AUTOMATED: CPT | Performed by: NURSE PRACTITIONER

## 2023-08-05 PROCEDURE — 99239 HOSP IP/OBS DSCHRG MGMT >30: CPT | Performed by: FAMILY MEDICINE

## 2023-08-05 RX ORDER — OXYCODONE HYDROCHLORIDE AND ACETAMINOPHEN 5; 325 MG/1; MG/1
1 TABLET ORAL EVERY 8 HOURS PRN
Qty: 15 TABLET | Refills: 0 | Status: SHIPPED | OUTPATIENT
Start: 2023-08-05 | End: 2023-08-10

## 2023-08-05 RX ADMIN — AZELASTINE HYDROCHLORIDE 1 SPRAY: 137 SPRAY, METERED NASAL at 09:08

## 2023-08-05 RX ADMIN — LEVOTHYROXINE SODIUM 50 MCG: 0.05 TABLET ORAL at 06:16

## 2023-08-05 RX ADMIN — LISINOPRIL 20 MG: 20 TABLET ORAL at 09:08

## 2023-08-05 RX ADMIN — ALLOPURINOL 300 MG: 100 TABLET ORAL at 09:08

## 2023-08-05 RX ADMIN — SPIRONOLACTONE 100 MG: 25 TABLET, FILM COATED ORAL at 09:07

## 2023-08-05 RX ADMIN — HYDROCHLOROTHIAZIDE 25 MG: 25 TABLET ORAL at 09:08

## 2023-08-05 RX ADMIN — PANTOPRAZOLE SODIUM 40 MG: 40 TABLET, DELAYED RELEASE ORAL at 09:08

## 2023-08-05 RX ADMIN — APIXABAN 10 MG: 5 TABLET, FILM COATED ORAL at 09:07

## 2023-08-05 RX ADMIN — Medication 10 ML: at 09:11

## 2023-08-05 RX ADMIN — OXYCODONE HYDROCHLORIDE AND ACETAMINOPHEN 1 TABLET: 5; 325 TABLET ORAL at 07:37

## 2023-08-05 RX ADMIN — TAMSULOSIN HYDROCHLORIDE 0.4 MG: 0.4 CAPSULE ORAL at 09:08

## 2023-08-05 RX ADMIN — BUPROPION HYDROCHLORIDE 300 MG: 150 TABLET, FILM COATED, EXTENDED RELEASE ORAL at 09:08

## 2023-08-05 RX ADMIN — FLUTICASONE PROPIONATE 1 SPRAY: 50 SPRAY, METERED NASAL at 09:08

## 2023-08-05 NOTE — PLAN OF CARE
Goal Outcome Evaluation:  Plan of Care Reviewed With: patient        Progress: eleni Durand has met protocol for safe discharge per provider. Discharge teaching complete. He will discharge home on Eliquis for PE/DVT. Spouse to provide transport and assist as needed.

## 2023-08-05 NOTE — DISCHARGE INSTRUCTIONS
-Take Eliquis 10 mg every 12 hours until 8/11, then continue with 5 mg every 12 hours.  -Follow-up with your oncologist for further management and treatment of your cancer and pulmonary embolism.  -Follow-up with pulmonology in the office as an outpatient as directed and scheduled.

## 2023-08-05 NOTE — PAYOR COMM NOTE
"D/C summary  UR manager; Katy Hathaway [phone] and fax [phone]      Ladarius Nye Junior (52 y.o. Male)       Date of Birth   1970    Social Security Number       Address   [address]  [facility] [place] [address]    Home Phone   [phone]    MRN   [id number]       Gnosticist   Unknown    Marital Status                               Admission Date   8/3/23    Admission Type   Emergency    Admitting Provider   Adonay Vásquez MD    Attending Provider       Department, Room/Bed   [facility] TELEMETRY /       Discharge Date   2023    Discharge Disposition   Home or Self Care    Discharge Destination                                 Attending Provider: (none)   Allergies: No Known Allergies    Isolation: None   Infection: None   Code Status: Prior    Ht: 175.3 cm (69.02\")   Wt: 118 kg (260 lb 5.8 oz)    Admission Cmt: None   Principal Problem: Pulmonary embolus [I26.99]                   Active Insurance as of 8/3/2023       Primary Coverage       Payor Plan Insurance Group Employer/Plan Group    [employer]HE[employer] BLUE CROSS ANTHEM BLUE CROSS BLUE SHIELD PPO [id number]       Payor Plan Address Payor Plan Phone Number Payor Plan Fax Number Effective Dates    [address] [phone]  2023 - None Entered    [place] [address]         Subscriber Name Subscriber Birth Date Member ID       STEFANI NYE 1973 [id number]                     Emergency Contacts        (Rel.) Home Phone Work Phone Mobile Phone    Stefani Nye (Spouse) -- -- [phone]              Physician Progress Notes (last 24 hours)  Notes from 23 1433 through 23 1433   No notes of this type exist for this encounter.          Discharge Summary        Adonay Vásquez MD at 23 0858              [facility] HOSPITALIST   DISCHARGE SUMMARY      Name:  Ladarius Nye   Age:  52 y.o.  Sex:  male  :  1970  MRN:  [id number]   Visit Number:  " [id number]    Admission Date:  8/3/2023  Date of Discharge:  8/5/2023  Primary Care Physician:  Maame Higgins, DO    Important issues to note:    -Patient discharged on Eliquis.  -Prescribed Percocet as needed.  -Patient follow-up with his oncologist at [facility] at  and with pulmonology.    Discharge Diagnoses:     Bilateral pulmonary emboli with right lung infarct, poa  Left lower extremity DVT  Metastatic Stage IV Colon cancer  HTN  SUKHWINDER    Problem List:     Active Hospital Problems    Diagnosis  POA    **Pulmonary embolus [I26.99]  Yes    Type 2 diabetes mellitus without complication [E11.9]  Yes    Obstructive sleep apnea syndrome [G47.33]  Yes    Hypertensive disorder [I10]  Yes      Resolved Hospital Problems   No resolved problems to display.     Presenting Problem:    Chief Complaint   Patient presents with    Abdominal Pain      Consults:     Consulting Physician(s)       Provider Relationship Specialty    Lou Luo MD Consulting Physician Pulmonary Disease          Procedures Performed:        History of presenting illness/Hospital Course:    This is a pleasant 52-year-old male patient who presents to the hospital today with complaints of right upper quadrant/right lower lateral chest discomfort for the last several days.  Patient states that he is also noticed some shortness of breath, mostly with exertion and when he is experiencing the pain in his right side.  He has a history of colon cancer stage IV with mets to the liver, diabetes mellitus type 2 that is currently untreated, hypertension to which she is taking spironolactone, and sleep apnea and wears a CPAP at night.  He is currently receiving infusion chemotherapy every 2 weeks at [facility].  He is not currently receiving radiation treatments.  No surgical intervention at this time.  He states for the last several days he has noticed progressively worsening right-sided pain.  He denies any cough, more  specifically any productive cough or hemoptysis.  Denies any recent travel.  Denies any unilateral leg swelling or pain.  Denies any previous episodes of PE or DVT.  He does report that he receives an injectable anticoagulant with each chemotherapy treatment he has received.  He denies any fever or chills.  Denies any nausea or vomiting.  Denies any diarrhea.  Pain is worse with activity and movement.     He presented to the emergency department today complaining of right upper quadrant abdominal pain and some shortness of breath.  An EKG was performed which revealed sinus rhythm with a rate of 88 bpm.  No acute ST changes were noted.  CBC was unremarkable.  CMP was noted for sodium of 135 but otherwise unremarkable.  Lipase was 99.  BNP normal.  Normal cardiac enzyme.  CT of the abdomen and pelvis with contrast and CAT scan of the chest reveal multiple large bilateral acute pulmonary embolisms.  There is additionally an area of a wedge-shaped opacity that is compatible with pulmonary infarct on the right side.  There is evidence of right heart strain.  A notable 5 mm pulmonary nodule in the left lower lobe and a few tiny pulmonary nodules in the right upper lobe.  These are indeterminate and metastasis could not be excluded based upon CT read.  There is also an indeterminate 13 mm right adrenal nodule which is concerning for metastasis.  There is no obvious colonic mass that is identified.  It is recommended for the patient to have a colonoscopy.  The patient was initiated on a heparin drip with bolus.  This will be managed by pharmacy.  The hospitalist was consulted for admission for further management of care.  Additionally, pulmonology was consulted and has agreed to see the patient for further recommendations.  He is currently on room air upon my examination and maintaining oxygen saturation greater than 95% on room air. Patient transitioned from IV Heparin to oral Eliquis.    Bilateral PE with right lung  infarct  Left lower extremity DVT  -pulmonology consulted and appreciate recommendations.  -Heparin discontinued, patient initiated on Eliquis.  -Titrated down on oxygen and was on room air with saturation above 95%  -+DVT to LLE  -2D echo completed, EF 64%  -hemodynamically stable  -Hestia criteria with zero points.  -We will follow-up with pulmonology as an outpatient.     Colon cancer  -Continue with outpatient management of IV chemo per oncology at [facility].  Patient has an appointment on next Tuesday.  -Will need outpatient colonoscopy and GI follow up based on CT findings (adrenal lesions noted)  -I personally discussed with the patient on the day of discharge about his CT findings including the right adrenal nodule. Patient reports that he is aware of the lung and hepatic lesions from previously.  Patient instructed on following up with his oncologist as scheduled on Tuesday and discuss further management and plan.      Discharge back to CT     HTN  -Home meds continued     Sleep Apnea  -CPAP per RT- patient does not have his machine  -Continuous pulse oximetry  -Will follow-up with pulmonology as an outpatient    Patient was seen and examined on day of discharge.  Patient was ambulating around the room, appears very comfortable with no distress, patient reports feeling significantly better and is ready to go home.  Patient was on room air and saturating above 95%.  Hemodynamically stable otherwise. Patient denies any chest pain, shortness of breath or palpitations.  Patient denies any acute symptoms currently.  Patient instructed on Eliquis, discussed risks and benefits and management plan.  Patient instructed on following up with his oncologist for further management of his cancer and PE.  Patient also to follow-up with pulmonology as an outpatient.  Patient instructed on following up with his PCP for continued of care.  He was instructed clearly on return precautions.  Patient verbalized understanding and agreeable to plan.  All questions were answered to his satisfaction.    Vital Signs:    Temp:  [98.1 øF (36.7 øC)-98.4 øF (36.9 øC)] 98.1 øF (36.7 øC)  Heart Rate:  [79-98] 79  Resp:  [15-18] 17  BP: (102-144)/(45-79) 127/65    Physical Exam:    General Appearance:  Alert and cooperative.  No acute distress.   Head:  Atraumatic and normocephalic.   Eyes: Conjunctivae and sclerae normal, no icterus. No pallor.   Ears:  Ears with no abnormalities noted.   Throat: No oral lesions, no thrush, oral mucosa moist.   Neck: Supple, trachea midline, no thyromegaly.   Back:   No kyphoscoliosis present. No tenderness to palpation.   Lungs:   Breath sounds heard bilaterally equally.  No crackles or wheezing. No Pleural rub or bronchial breathing.  Unlabored.  Nontachypneic.   Heart:  Normal S1 and S2, no murmur, no gallop, no rub. No JVD.   Abdomen:   Normal bowel sounds, no masses, no organomegaly. Soft, nontender, nondistended, no rebound tenderness.   Extremities: Supple, no edema, no cyanosis, no clubbing.   Pulses: Pulses palpable bilaterally.   Skin: No bleeding or rash.   Neurologic: Alert and oriented x 3. No facial asymmetry. Moves all four limbs. No tremors.  Ambulatory with no difficulty.     Pertinent Lab Results:     Results from last 7 days   Lab Units 08/05/23 0541 08/04/23 0443 08/03/23  1122   SODIUM mmol/L 135* 134* 135*   POTASSIUM mmol/L 4.1 4.4 4.5   CHLORIDE mmol/L 101 100 101   CO2 mmol/L 23.8 25.4 22.6   BUN mg/dL 19 16 16   CREATININE mg/dL 1.15 1.05 0.99   CALCIUM mg/dL 8.1* 8.4* 8.9   BILIRUBIN mg/dL  --   --  0.6   ALK PHOS U/L  --   --  122*   ALT (SGPT) U/L  --   --  27   AST (SGOT) U/L  --   --  29   GLUCOSE mg/dL 133* 101* 98     Results from last 7 days   Lab Units 08/05/23 0541 08/04/23 0443 08/03/23  1122   WBC 10*3/mm3 6.97 8.76 9.83   HEMOGLOBIN g/dL 10.4* 11.1* 13.0   HEMATOCRIT % 31.4* 34.1* 39.3   PLATELETS 10*3/mm3 136* 143 174     Results from  last 7 days   Lab Units 08/03/23  1122   INR  1.04     Results from last 7 days   Lab Units 08/03/23  1122   HSTROP T ng/L 8     Results from last 7 days   Lab Units 08/03/23  1122   PROBNP pg/mL <36.0         Results from last 7 days   Lab Units 08/03/23  1122   LIPASE U/L 99*               Pertinent Radiology Results:    Imaging Results (All)       Procedure Component Value Units Date/Time    US Venous Doppler Lower Extremity Bilateral (duplex) [[id number]] Collected: 08/03/23 2026     Updated: 08/03/23 2027    Narrative:      FINAL REPORT    CLINICAL HISTORY:  PE, r/o DVT    FINDINGS:  Right lower extremity:    Grayscale, color Doppler and pulsed Doppler evaluation revealed normal compressibility, flow and augmentation.  There are normal venous waveforms.    Left lower extremity:    There is noncompressible thrombus occluding the femoral and popliteal veins.      Impression:      Left DVT, above the knee.    Authenticated and Electronically Signed by Viktor Gallegos M.D. on  08/03/2023 08:26:59 PM    CT Abdomen Pelvis With Contrast [[id number]] Collected: 08/03/23 1213     Updated: 08/03/23 1330    Narrative:      CT SCAN OF THE CHEST, ABDOMEN AND PELVIS WITH CONTRAST    8/3/2023 1:17  PM      HISTORY:   right flank pain .     PROCEDURE:   Axial CT images were obtained from the lung apex to the pubic symphysis  following IV contrast administration. Coronal and sagittal reformatted  images were generated from the axial data set and provided for  interpretation. This study was performed with techniques to keep  radiation doses as low as reasonably achievable, (ALARA). Individualized  dose reduction techniques using automated exposure control or adjustment  of mA and/or kV according to the patient size were employed.     COMPARISON:   CT abdomen and pelvis dated 2/4/2016.     FINDINGS:      CHEST:   Lungs/Pleura:  Central airways are patent. Right lower lobe large peripheral  wedge-shaped opacity, likely a pulmonary  infarct. There is a 5 mm  pulmonary nodule in the left lower lobe. There are a few tiny pulmonary  nodules in the anterior right upper lobe. No pneumothorax or pleural  effusion.     Vessels:  There are multiple large intraluminal filling defects within the right  lower lobe lobar, segmental and subsegmental pulmonary arteries,  compatible with acute pulmonary embolism. There are few scattered areas  of left lower lobe subsegmental filling defects, compatible with acute  pulmonary emboli. Thoracic aorta is normal in caliber. Coronary artery  disease is present.     Heart/mediastinum:  Heart is normal in size. There is evidence of right heart strain with  reversal of the intraventricular septum, right ventricle is measuring  approximately 40 mm in diameter left ventricle is measuring  approximately 44 mm in diameter (series 5, image 60). No pericardial  effusion.      Lymph nodes:  No pathologically enlarged thoracic lymph nodes. Right hilar  lymphadenopathy, likely reactive/inflammatory. Partially calcified  subcarinal and left hilar and right hilar lymph nodes.     Chest wall:  No acute findings right chest wall. Port-A-Cath tip in the SVC.     Bones:  No acute fracture. No aggressive osseous sclerotic or lucent lesions.     ABDOMEN/PELVIS:  Liver, gallbladder and bile ducts:  The liver demonstrates innumerable hypodense lesions, compatible with  hepatic steatosis, largest lesion in the right hemiliver measuring up to  5 cm. Unremarkable gallbladder. No biliary ductal dilatation.      Adrenal glands:  There is indeterminate 13 mm right adrenal nodule. There is  fat-containing 2.3 cm left adrenal nodule, probable adrenal myelolipoma.     Kidneys, ureters and urinary bladder:  No suspicious renal lesions. Right nephrolithiasis with few stones in  the right kidney measuring up to 6 mm. There are few stones in the left  kidney midpole. No hydronephrosis. Unremarkable urinary bladder.     Spleen:  The spleen is normal  in size.     Pancreas:  The pancreas is unremarkable.      Gastrointestinal system and mesentery:  There is no evidence of bowel obstruction. The appendix is visualized  and unremarkable. No significant mesenteric inflammation. No discrete  colonic mass is identified. Moderate amount of colonic stool burden.     Lymph nodes:  No pathologically enlarged abdominal or pelvic lymph nodes are present.     Vessels:  The abdominal aorta is normal in caliber. The celiac trunk, superior  mesenteric artery, inferior mesenteric artery and their branch vessels  appear grossly patent. The superior mesenteric vein, splenic vein and  main portal veins are patent. The inferior vena cava is unremarkable.     Peritoneum:  No free intraperitoneal fluid or pneumoperitoneum.     Pelvic viscera:  No acute findings.     Body wall:  No acute findings. No significant body wall hernias.     Bones:  No acute fracture. There is a sclerotic focus in the left iliac bone,  likely a bony island or osseous metastasis. No aggressive osseous  sclerotic or lucent lesions. Moderate multilevel degenerative changes of  the lumbar spine.       Impression:      There are multiple large intraluminal filling defects in the right lower  lobe lobar, segmental and subsegmental pulmonary arteries, consistent  with acute pulmonary embolism. Right lower lobe large area of  wedge-shaped opacity, compatible with pulmonary infarct. There are few  subsegmental pulmonary arterial filling defects in the left lower lobe,  compatible with acute pulmonary emboli. There is evidence of right heart  strain with reversal of interventricular septum. Correlate clinically.     There is a 5 mm pulmonary nodule in the left lower lobe. Few tiny  pulmonary nodules in the right upper lobe. These nodules are  indeterminate, metastasis cannot be excluded. Correlate clinically and  consider follow-up imaging.     Innumerable hypodense hepatic lesions, compatible with metastases.  Unknown  primary.     Indeterminate 13 mm right adrenal nodule, concerning for metastasis.     No definite colonic mass is identified, suspected. Recommended  colonoscopy evaluation if not already performed.     Nonobstructing bilateral nephrolithiasis.           These findings were discussed with JENELLE TORRES PA-C on  8/3/2023 1:19 PM by EDUARDO Grey, over the phone.     Images personally reviewed, interpreted and dictated by EDAURDO Grey.                             This report was signed and finalized on 8/3/2023 1:28 PM by EDUARDO Grey.       CT Angiogram Chest Pulmonary Embolism [[id number]] Collected: 08/03/23 1213     Updated: 08/03/23 1330    Narrative:      CT SCAN OF THE CHEST, ABDOMEN AND PELVIS WITH CONTRAST    8/3/2023 1:17  PM      HISTORY:   right flank pain .     PROCEDURE:   Axial CT images were obtained from the lung apex to the pubic symphysis  following IV contrast administration. Coronal and sagittal reformatted  images were generated from the axial data set and provided for  interpretation. This study was performed with techniques to keep  radiation doses as low as reasonably achievable, (ALARA). Individualized  dose reduction techniques using automated exposure control or adjustment  of mA and/or kV according to the patient size were employed.     COMPARISON:   CT abdomen and pelvis dated 2/4/2016.     FINDINGS:      CHEST:   Lungs/Pleura:  Central airways are patent. Right lower lobe large peripheral  wedge-shaped opacity, likely a pulmonary infarct. There is a 5 mm  pulmonary nodule in the left lower lobe. There are a few tiny pulmonary  nodules in the anterior right upper lobe. No pneumothorax or pleural  effusion.     Vessels:  There are multiple large intraluminal filling defects within the right  lower lobe lobar, segmental and subsegmental pulmonary arteries,  compatible with acute pulmonary embolism. There are few scattered areas  of left lower lobe  subsegmental filling defects, compatible with acute  pulmonary emboli. Thoracic aorta is normal in caliber. Coronary artery  disease is present.     Heart/mediastinum:  Heart is normal in size. There is evidence of right heart strain with  reversal of the intraventricular septum, right ventricle is measuring  approximately 40 mm in diameter left ventricle is measuring  approximately 44 mm in diameter (series 5, image 60). No pericardial  effusion.      Lymph nodes:  No pathologically enlarged thoracic lymph nodes. Right hilar  lymphadenopathy, likely reactive/inflammatory. Partially calcified  subcarinal and left hilar and right hilar lymph nodes.     Chest wall:  No acute findings right chest wall. Port-A-Cath tip in the SVC.     Bones:  No acute fracture. No aggressive osseous sclerotic or lucent lesions.     ABDOMEN/PELVIS:  Liver, gallbladder and bile ducts:  The liver demonstrates innumerable hypodense lesions, compatible with  hepatic steatosis, largest lesion in the right hemiliver measuring up to  5 cm. Unremarkable gallbladder. No biliary ductal dilatation.      Adrenal glands:  There is indeterminate 13 mm right adrenal nodule. There is  fat-containing 2.3 cm left adrenal nodule, probable adrenal myelolipoma.     Kidneys, ureters and urinary bladder:  No suspicious renal lesions. Right nephrolithiasis with few stones in  the right kidney measuring up to 6 mm. There are few stones in the left  kidney midpole. No hydronephrosis. Unremarkable urinary bladder.     Spleen:  The spleen is normal in size.     Pancreas:  The pancreas is unremarkable.      Gastrointestinal system and mesentery:  There is no evidence of bowel obstruction. The appendix is visualized  and unremarkable. No significant mesenteric inflammation. No discrete  colonic mass is identified. Moderate amount of colonic stool burden.     Lymph nodes:  No pathologically enlarged abdominal or pelvic lymph nodes are present.     Vessels:  The  abdominal aorta is normal in caliber. The celiac trunk, superior  mesenteric artery, inferior mesenteric artery and their branch vessels  appear grossly patent. The superior mesenteric vein, splenic vein and  main portal veins are patent. The inferior vena cava is unremarkable.     Peritoneum:  No free intraperitoneal fluid or pneumoperitoneum.     Pelvic viscera:  No acute findings.     Body wall:  No acute findings. No significant body wall hernias.     Bones:  No acute fracture. There is a sclerotic focus in the left iliac bone,  likely a bony island or osseous metastasis. No aggressive osseous  sclerotic or lucent lesions. Moderate multilevel degenerative changes of  the lumbar spine.       Impression:      There are multiple large intraluminal filling defects in the right lower  lobe lobar, segmental and subsegmental pulmonary arteries, consistent  with acute pulmonary embolism. Right lower lobe large area of  wedge-shaped opacity, compatible with pulmonary infarct. There are few  subsegmental pulmonary arterial filling defects in the left lower lobe,  compatible with acute pulmonary emboli. There is evidence of right heart  strain with reversal of interventricular septum. Correlate clinically.     There is a 5 mm pulmonary nodule in the left lower lobe. Few tiny  pulmonary nodules in the right upper lobe. These nodules are  indeterminate, metastasis cannot be excluded. Correlate clinically and  consider follow-up imaging.     Innumerable hypodense hepatic lesions, compatible with metastases.  Unknown primary.     Indeterminate 13 mm right adrenal nodule, concerning for metastasis.     No definite colonic mass is identified, suspected. Recommended  colonoscopy evaluation if not already performed.     Nonobstructing bilateral nephrolithiasis.           These findings were discussed with JENELLE TORRES PA-C on  8/3/2023 1:19 PM by EDUARDO Grey, over the phone.     Images personally reviewed,  interpreted and dictated by EDUARDO Grey.                             This report was signed and finalized on 8/3/2023 1:28 PM by EDUARDO Grey.               Echo:    Results for orders placed during the hospital encounter of 08/03/23    Adult Transthoracic Echo Complete w/ Color, Spectral and Contrast if necessary per protocol    Interpretation Summary    Left ventricular systolic function is normal. Estimated left ventricular EF = 64% Left ventricular ejection fraction appears to be 61 - 65%.    Left ventricular diastolic function was normal.    Condition on Discharge:      Stable.    Code status during the hospital stay:    Code Status and Medical Interventions:   Ordered at: 08/03/23 1614     Level Of Support Discussed With:    Patient     Code Status (Patient has no pulse and is not breathing):    CPR (Attempt to Resuscitate)     Medical Interventions (Patient has pulse or is breathing):    Full Support     Release to patient:    Routine Release     Discharge Disposition:    Home or Self Care    Discharge Medications:       Discharge Medications        New Medications        Instructions Start Date   apixaban 5 MG tablet tablet  Commonly known as: ELIQUIS   10 mg, Oral, Every 12 Hours Scheduled      apixaban 5 MG tablet tablet  Commonly known as: ELIQUIS   5 mg, Oral, Every 12 Hours Scheduled   Start Date: August 11, 2023     oxyCODONE-acetaminophen 5-325 MG per tablet  Commonly known as: PERCOCET   1 tablet, Oral, Every 8 Hours PRN             Continue These Medications        Instructions Start Date   allopurinol 300 MG tablet  Commonly known as: ZYLOPRIM   300 mg, Oral, Daily      azelastine 0.1 % nasal spray  Commonly known as: ASTELIN   One spray each nostril at night as needed  PT stated only uses as needed      buPROPion  MG 24 hr tablet  Commonly known as: WELLBUTRIN XL   300 mg, Oral, Daily      ferrous sulfate 325 (65 FE) MG tablet   325 mg, Oral, Daily With Breakfast       fluticasone 50 MCG/ACT nasal spray  Commonly known as: FLONASE   INHALE 2 SPRAYS BY INTRANASAL ROUTE EVERY DAY IN EACH NOSTRIL      Hydrocortisone (Perianal) 2.5 % rectal cream  Commonly known as: ANUSOL-HC   Rectal, 2 Times Daily PRN      levothyroxine 50 MCG tablet  Commonly known as: SYNTHROID, LEVOTHROID   50 mcg, Oral, Every Morning      lisinopril-hydrochlorothiazide 20-25 MG per tablet  Commonly known as: PRINZIDE,ZESTORETIC   Take 1 tablet by mouth Daily.      metFORMIN  MG 24 hr tablet  Commonly known as: GLUCOPHAGE-XR   500 mg, Daily With Breakfast      ondansetron 4 MG tablet  Commonly known as: ZOFRAN   4 mg, Oral, As Needed      pantoprazole 40 MG EC tablet  Commonly known as: PROTONIX   40 mg, Oral, Daily      spironolactone 100 MG tablet  Commonly known as: ALDACTONE   100 mg, Oral, Daily      tamsulosin 0.4 MG capsule 24 hr capsule  Commonly known as: FLOMAX   1 capsule, Oral, Daily, 2 CAPSULES BY MOUTH DAILY             Discharge Diet:   Cardiac    Activity at Discharge:   As tolerated    Follow-up Appointments:     Follow-up Information       Maame Higgins, DO Follow up in 3 day(s).    Specialty: Family Medicine  Contact information:  [address] [facility] [address]  [phone]               Lou Luo MD Follow up in 1 week(s).    Specialty: Pulmonary Disease  Contact information:  [address]  [facility] [address]  [phone]                           Future Appointments   Date Time Provider Department Center   7/8/2024  1:45 PM Eileen Boggs MD [facility]     Test Results Pending at Discharge:           Adonay Vásquez MD  08/05/23  08:58 EDT    Time: I spent 36 minutes on this discharge activity which included: face-to-face encounter with the patient, reviewing the data in the system, coordination of the care with the nursing staff as well as consultants, documentation, and entering orders.     Dictated utilizing Ash  dictation.      Electronically signed by Adonay Vásquez MD at 08/05/23 [id number]

## 2023-08-05 NOTE — PLAN OF CARE
Goal Outcome Evaluation:  Plan of Care Reviewed With: patient        Progress: improving  Outcome Evaluation: PT RESTING COMFORTABLY THIS SHIFT. PRN PAIN MEDICATION GIVEN ONCE THIS SHIFT. PT REPORTS FEELING MUCH IMPROVED THIS SHIFT WITH DECREASED PAIN TO RIGHT FLANK AREA OVERALL. PT HOPEFUL TO BE DISCHARGED HOME TODAY ON PO ELIQUIS. VSS. WILL CONTINUE TO MONITOR CLOSELY.

## 2023-08-05 NOTE — CASE MANAGEMENT/SOCIAL WORK
Case Management Discharge Note                Selected Continued Care - Discharged on 8/5/2023 Admission date: 8/3/2023 - Discharge disposition: Home or Self Care      Destination    No services have been selected for the patient.                Durable Medical Equipment    No services have been selected for the patient.                Dialysis/Infusion    No services have been selected for the patient.                Home Medical Care    No services have been selected for the patient.                Therapy    No services have been selected for the patient.                Community Resources    No services have been selected for the patient.                Community & DME    No services have been selected for the patient.                    Transportation Services  Private: Car    Final Discharge Disposition Code: 01 - home or self-care

## 2023-08-05 NOTE — DISCHARGE SUMMARY
[facility]   DISCHARGE SUMMARY      Name:  Ladarius Jones   Age:  52 y.o.  Sex:  male  :  1970  MRN:  [id number]   Visit Number:  [id number]    Admission Date:  8/3/2023  Date of Discharge:  2023  Primary Care Physician:  Maame Higgins, DO    Important issues to note:    -Patient discharged on Eliquis.  -Prescribed Percocet as needed.  -Patient follow-up with his oncologist at [facility] at  and with pulmonology.    Discharge Diagnoses:     Bilateral pulmonary emboli with right lung infarct, poa  Left lower extremity DVT  Metastatic Stage IV Colon cancer  HTN  SUKHWINDER    Problem List:     Active Hospital Problems    Diagnosis  POA    **Pulmonary embolus [I26.99]  Yes    Type 2 diabetes mellitus without complication [E11.9]  Yes    Obstructive sleep apnea syndrome [G47.33]  Yes    Hypertensive disorder [I10]  Yes      Resolved Hospital Problems   No resolved problems to display.     Presenting Problem:    Chief Complaint   Patient presents with    Abdominal Pain      Consults:     Consulting Physician(s)       Provider Relationship Specialty    Lou Luo MD Consulting Physician Pulmonary Disease          Procedures Performed:        History of presenting illness/Hospital Course:    This is a pleasant 52-year-old male patient who presents to the hospital today with complaints of right upper quadrant/right lower lateral chest discomfort for the last several days.  Patient states that he is also noticed some shortness of breath, mostly with exertion and when he is experiencing the pain in his right side.  He has a history of colon cancer stage IV with mets to the liver, diabetes mellitus type 2 that is currently untreated, hypertension to which she is taking spironolactone, and sleep apnea and wears a CPAP at night.  He is currently receiving infusion chemotherapy every 2 weeks at [facility].  He is not currently receiving radiation  treatments.  No surgical intervention at this time.  He states for the last several days he has noticed progressively worsening right-sided pain.  He denies any cough, more specifically any productive cough or hemoptysis.  Denies any recent travel.  Denies any unilateral leg swelling or pain.  Denies any previous episodes of PE or DVT.  He does report that he receives an injectable anticoagulant with each chemotherapy treatment he has received.  He denies any fever or chills.  Denies any nausea or vomiting.  Denies any diarrhea.  Pain is worse with activity and movement.     He presented to the emergency department today complaining of right upper quadrant abdominal pain and some shortness of breath.  An EKG was performed which revealed sinus rhythm with a rate of 88 bpm.  No acute ST changes were noted.  CBC was unremarkable.  CMP was noted for sodium of 135 but otherwise unremarkable.  Lipase was 99.  BNP normal.  Normal cardiac enzyme.  CT of the abdomen and pelvis with contrast and CAT scan of the chest reveal multiple large bilateral acute pulmonary embolisms.  There is additionally an area of a wedge-shaped opacity that is compatible with pulmonary infarct on the right side.  There is evidence of right heart strain.  A notable 5 mm pulmonary nodule in the left lower lobe and a few tiny pulmonary nodules in the right upper lobe.  These are indeterminate and metastasis could not be excluded based upon CT read.  There is also an indeterminate 13 mm right adrenal nodule which is concerning for metastasis.  There is no obvious colonic mass that is identified.  It is recommended for the patient to have a colonoscopy.  The patient was initiated on a heparin drip with bolus.  This will be managed by pharmacy.  The hospitalist was consulted for admission for further management of care.  Additionally, pulmonology was consulted and has agreed to see the patient for further recommendations.  He is currently on room air  upon my examination and maintaining oxygen saturation greater than 95% on room air. Patient transitioned from IV Heparin to oral Eliquis.    Bilateral PE with right lung infarct  Left lower extremity DVT  -pulmonology consulted and appreciate recommendations.  -Heparin discontinued, patient initiated on Eliquis.  -Titrated down on oxygen and was on room air with saturation above 95%  -+DVT to LLE  -2D echo completed, EF 64%  -hemodynamically stable  -Hestia criteria with zero points.  -We will follow-up with pulmonology as an outpatient.     Colon cancer  -Continue with outpatient management of IV chemo per oncology at [facility].  Patient has an appointment on next Tuesday.  -Will need outpatient colonoscopy and GI follow up based on CT findings (adrenal lesions noted)  -I personally discussed with the patient on the day of discharge about his CT findings including the right adrenal nodule. Patient reports that he is aware of the lung and hepatic lesions from previously.  Patient instructed on following up with his oncologist as scheduled on Tuesday and discuss further management and plan.      Discharge back to CT     HTN  -Home meds continued     Sleep Apnea  -CPAP per RT- patient does not have his machine  -Continuous pulse oximetry  -Will follow-up with pulmonology as an outpatient    Patient was seen and examined on day of discharge.  Patient was ambulating around the room, appears very comfortable with no distress, patient reports feeling significantly better and is ready to go home.  Patient was on room air and saturating above 95%.  Hemodynamically stable otherwise. Patient denies any chest pain, shortness of breath or palpitations.  Patient denies any acute symptoms currently.  Patient instructed on Eliquis, discussed risks and benefits and management plan.  Patient instructed on following up with his oncologist for further management of his cancer and PE.  Patient also to  follow-up with pulmonology as an outpatient.  Patient instructed on following up with his PCP for continued of care.  He was instructed clearly on return precautions. Patient verbalized understanding and agreeable to plan.  All questions were answered to his satisfaction.    Vital Signs:    Temp:  [98.1 øF (36.7 øC)-98.4 øF (36.9 øC)] 98.1 øF (36.7 øC)  Heart Rate:  [79-98] 79  Resp:  [15-18] 17  BP: (102-144)/(45-79) 127/65    Physical Exam:    General Appearance:  Alert and cooperative.  No acute distress.   Head:  Atraumatic and normocephalic.   Eyes: Conjunctivae and sclerae normal, no icterus. No pallor.   Ears:  Ears with no abnormalities noted.   Throat: No oral lesions, no thrush, oral mucosa moist.   Neck: Supple, trachea midline, no thyromegaly.   Back:   No kyphoscoliosis present. No tenderness to palpation.   Lungs:   Breath sounds heard bilaterally equally.  No crackles or wheezing. No Pleural rub or bronchial breathing.  Unlabored.  Nontachypneic.   Heart:  Normal S1 and S2, no murmur, no gallop, no rub. No JVD.   Abdomen:   Normal bowel sounds, no masses, no organomegaly. Soft, nontender, nondistended, no rebound tenderness.   Extremities: Supple, no edema, no cyanosis, no clubbing.   Pulses: Pulses palpable bilaterally.   Skin: No bleeding or rash.   Neurologic: Alert and oriented x 3. No facial asymmetry. Moves all four limbs. No tremors.  Ambulatory with no difficulty.     Pertinent Lab Results:     Results from last 7 days   Lab Units 08/05/23  0541 08/04/23  0443 08/03/23  1122   SODIUM mmol/L 135* 134* 135*   POTASSIUM mmol/L 4.1 4.4 4.5   CHLORIDE mmol/L 101 100 101   CO2 mmol/L 23.8 25.4 22.6   BUN mg/dL 19 16 16   CREATININE mg/dL 1.15 1.05 0.99   CALCIUM mg/dL 8.1* 8.4* 8.9   BILIRUBIN mg/dL  --   --  0.6   ALK PHOS U/L  --   --  122*   ALT (SGPT) U/L  --   --  27   AST (SGOT) U/L  --   --  29   GLUCOSE mg/dL 133* 101* 98     Results from last 7 days   Lab Units 08/05/23  0541 08/04/23  0443  08/03/23  1122   WBC 10*3/mm3 6.97 8.76 9.83   HEMOGLOBIN g/dL 10.4* 11.1* 13.0   HEMATOCRIT % 31.4* 34.1* 39.3   PLATELETS 10*3/mm3 136* 143 174     Results from last 7 days   Lab Units 08/03/23  1122   INR  1.04     Results from last 7 days   Lab Units 08/03/23  1122   HSTROP T ng/L 8     Results from last 7 days   Lab Units 08/03/23  1122   PROBNP pg/mL <36.0         Results from last 7 days   Lab Units 08/03/23  1122   LIPASE U/L 99*               Pertinent Radiology Results:    Imaging Results (All)       Procedure Component Value Units Date/Time    US Venous Doppler Lower Extremity Bilateral (duplex) [[id number]] Collected: 08/03/23 2026     Updated: 08/03/23 2027    Narrative:      FINAL REPORT    CLINICAL HISTORY:  PE, r/o DVT    FINDINGS:  Right lower extremity:    Grayscale, color Doppler and pulsed Doppler evaluation revealed normal compressibility, flow and augmentation.  There are normal venous waveforms.    Left lower extremity:    There is noncompressible thrombus occluding the femoral and popliteal veins.      Impression:      Left DVT, above the knee.    Authenticated and Electronically Signed by Viktor Gallegos M.D. on  08/03/2023 08:26:59 PM    CT Abdomen Pelvis With Contrast [[id number]] Collected: 08/03/23 1213     Updated: 08/03/23 1330    Narrative:      CT SCAN OF THE CHEST, ABDOMEN AND PELVIS WITH CONTRAST    8/3/2023 1:17  PM      HISTORY:   right flank pain .     PROCEDURE:   Axial CT images were obtained from the lung apex to the pubic symphysis  following IV contrast administration. Coronal and sagittal reformatted  images were generated from the axial data set and provided for  interpretation. This study was performed with techniques to keep  radiation doses as low as reasonably achievable, (ALARA). Individualized  dose reduction techniques using automated exposure control or adjustment  of mA and/or kV according to the patient size were employed.     COMPARISON:   CT abdomen and pelvis dated  2/4/2016.     FINDINGS:      CHEST:   Lungs/Pleura:  Central airways are patent. Right lower lobe large peripheral  wedge-shaped opacity, likely a pulmonary infarct. There is a 5 mm  pulmonary nodule in the left lower lobe. There are a few tiny pulmonary  nodules in the anterior right upper lobe. No pneumothorax or pleural  effusion.     Vessels:  There are multiple large intraluminal filling defects within the right  lower lobe lobar, segmental and subsegmental pulmonary arteries,  compatible with acute pulmonary embolism. There are few scattered areas  of left lower lobe subsegmental filling defects, compatible with acute  pulmonary emboli. Thoracic aorta is normal in caliber. Coronary artery  disease is present.     Heart/mediastinum:  Heart is normal in size. There is evidence of right heart strain with  reversal of the intraventricular septum, right ventricle is measuring  approximately 40 mm in diameter left ventricle is measuring  approximately 44 mm in diameter (series 5, image 60). No pericardial  effusion.      Lymph nodes:  No pathologically enlarged thoracic lymph nodes. Right hilar  lymphadenopathy, likely reactive/inflammatory. Partially calcified  subcarinal and left hilar and right hilar lymph nodes.     Chest wall:  No acute findings right chest wall. Port-A-Cath tip in the SVC.     Bones:  No acute fracture. No aggressive osseous sclerotic or lucent lesions.     ABDOMEN/PELVIS:  Liver, gallbladder and bile ducts:  The liver demonstrates innumerable hypodense lesions, compatible with  hepatic steatosis, largest lesion in the right hemiliver measuring up to  5 cm. Unremarkable gallbladder. No biliary ductal dilatation.      Adrenal glands:  There is indeterminate 13 mm right adrenal nodule. There is  fat-containing 2.3 cm left adrenal nodule, probable adrenal myelolipoma.     Kidneys, ureters and urinary bladder:  No suspicious renal lesions. Right nephrolithiasis with few stones in  the right kidney  measuring up to 6 mm. There are few stones in the left  kidney midpole. No hydronephrosis. Unremarkable urinary bladder.     Spleen:  The spleen is normal in size.     Pancreas:  The pancreas is unremarkable.      Gastrointestinal system and mesentery:  There is no evidence of bowel obstruction. The appendix is visualized  and unremarkable. No significant mesenteric inflammation. No discrete  colonic mass is identified. Moderate amount of colonic stool burden.     Lymph nodes:  No pathologically enlarged abdominal or pelvic lymph nodes are present.     Vessels:  The abdominal aorta is normal in caliber. The celiac trunk, superior  mesenteric artery, inferior mesenteric artery and their branch vessels  appear grossly patent. The superior mesenteric vein, splenic vein and  main portal veins are patent. The inferior vena cava is unremarkable.     Peritoneum:  No free intraperitoneal fluid or pneumoperitoneum.     Pelvic viscera:  No acute findings.     Body wall:  No acute findings. No significant body wall hernias.     Bones:  No acute fracture. There is a sclerotic focus in the left iliac bone,  likely a bony island or osseous metastasis. No aggressive osseous  sclerotic or lucent lesions. Moderate multilevel degenerative changes of  the lumbar spine.       Impression:      There are multiple large intraluminal filling defects in the right lower  lobe lobar, segmental and subsegmental pulmonary arteries, consistent  with acute pulmonary embolism. Right lower lobe large area of  wedge-shaped opacity, compatible with pulmonary infarct. There are few  subsegmental pulmonary arterial filling defects in the left lower lobe,  compatible with acute pulmonary emboli. There is evidence of right heart  strain with reversal of interventricular septum. Correlate clinically.     There is a 5 mm pulmonary nodule in the left lower lobe. Few tiny  pulmonary nodules in the right upper lobe. These nodules are  indeterminate,  metastasis cannot be excluded. Correlate clinically and  consider follow-up imaging.     Innumerable hypodense hepatic lesions, compatible with metastases.  Unknown primary.     Indeterminate 13 mm right adrenal nodule, concerning for metastasis.     No definite colonic mass is identified, suspected. Recommended  colonoscopy evaluation if not already performed.     Nonobstructing bilateral nephrolithiasis.           These findings were discussed with JENELLE TORRES PA-C on  8/3/2023 1:19 PM by EDUARDO Grey, over the phone.     Images personally reviewed, interpreted and dictated by EDUARDO Grey.                             This report was signed and finalized on 8/3/2023 1:28 PM by EDUARDO Grey.       CT Angiogram Chest Pulmonary Embolism [[id number]] Collected: 08/03/23 1213     Updated: 08/03/23 1330    Narrative:      CT SCAN OF THE CHEST, ABDOMEN AND PELVIS WITH CONTRAST    8/3/2023 1:17  PM      HISTORY:   right flank pain .     PROCEDURE:   Axial CT images were obtained from the lung apex to the pubic symphysis  following IV contrast administration. Coronal and sagittal reformatted  images were generated from the axial data set and provided for  interpretation. This study was performed with techniques to keep  radiation doses as low as reasonably achievable, (ALARA). Individualized  dose reduction techniques using automated exposure control or adjustment  of mA and/or kV according to the patient size were employed.     COMPARISON:   CT abdomen and pelvis dated 2/4/2016.     FINDINGS:      CHEST:   Lungs/Pleura:  Central airways are patent. Right lower lobe large peripheral  wedge-shaped opacity, likely a pulmonary infarct. There is a 5 mm  pulmonary nodule in the left lower lobe. There are a few tiny pulmonary  nodules in the anterior right upper lobe. No pneumothorax or pleural  effusion.     Vessels:  There are multiple large intraluminal filling defects within the  right  lower lobe lobar, segmental and subsegmental pulmonary arteries,  compatible with acute pulmonary embolism. There are few scattered areas  of left lower lobe subsegmental filling defects, compatible with acute  pulmonary emboli. Thoracic aorta is normal in caliber. Coronary artery  disease is present.     Heart/mediastinum:  Heart is normal in size. There is evidence of right heart strain with  reversal of the intraventricular septum, right ventricle is measuring  approximately 40 mm in diameter left ventricle is measuring  approximately 44 mm in diameter (series 5, image 60). No pericardial  effusion.      Lymph nodes:  No pathologically enlarged thoracic lymph nodes. Right hilar  lymphadenopathy, likely reactive/inflammatory. Partially calcified  subcarinal and left hilar and right hilar lymph nodes.     Chest wall:  No acute findings right chest wall. Port-A-Cath tip in the SVC.     Bones:  No acute fracture. No aggressive osseous sclerotic or lucent lesions.     ABDOMEN/PELVIS:  Liver, gallbladder and bile ducts:  The liver demonstrates innumerable hypodense lesions, compatible with  hepatic steatosis, largest lesion in the right hemiliver measuring up to  5 cm. Unremarkable gallbladder. No biliary ductal dilatation.      Adrenal glands:  There is indeterminate 13 mm right adrenal nodule. There is  fat-containing 2.3 cm left adrenal nodule, probable adrenal myelolipoma.     Kidneys, ureters and urinary bladder:  No suspicious renal lesions. Right nephrolithiasis with few stones in  the right kidney measuring up to 6 mm. There are few stones in the left  kidney midpole. No hydronephrosis. Unremarkable urinary bladder.     Spleen:  The spleen is normal in size.     Pancreas:  The pancreas is unremarkable.      Gastrointestinal system and mesentery:  There is no evidence of bowel obstruction. The appendix is visualized  and unremarkable. No significant mesenteric inflammation. No discrete  colonic mass is  identified. Moderate amount of colonic stool burden.     Lymph nodes:  No pathologically enlarged abdominal or pelvic lymph nodes are present.     Vessels:  The abdominal aorta is normal in caliber. The celiac trunk, superior  mesenteric artery, inferior mesenteric artery and their branch vessels  appear grossly patent. The superior mesenteric vein, splenic vein and  main portal veins are patent. The inferior vena cava is unremarkable.     Peritoneum:  No free intraperitoneal fluid or pneumoperitoneum.     Pelvic viscera:  No acute findings.     Body wall:  No acute findings. No significant body wall hernias.     Bones:  No acute fracture. There is a sclerotic focus in the left iliac bone,  likely a bony island or osseous metastasis. No aggressive osseous  sclerotic or lucent lesions. Moderate multilevel degenerative changes of  the lumbar spine.       Impression:      There are multiple large intraluminal filling defects in the right lower  lobe lobar, segmental and subsegmental pulmonary arteries, consistent  with acute pulmonary embolism. Right lower lobe large area of  wedge-shaped opacity, compatible with pulmonary infarct. There are few  subsegmental pulmonary arterial filling defects in the left lower lobe,  compatible with acute pulmonary emboli. There is evidence of right heart  strain with reversal of interventricular septum. Correlate clinically.     There is a 5 mm pulmonary nodule in the left lower lobe. Few tiny  pulmonary nodules in the right upper lobe. These nodules are  indeterminate, metastasis cannot be excluded. Correlate clinically and  consider follow-up imaging.     Innumerable hypodense hepatic lesions, compatible with metastases.  Unknown primary.     Indeterminate 13 mm right adrenal nodule, concerning for metastasis.     No definite colonic mass is identified, suspected. Recommended  colonoscopy evaluation if not already performed.     Nonobstructing bilateral nephrolithiasis.            These findings were discussed with JENELLE TORRES PA-C on  8/3/2023 1:19 PM by EDUARDO Grey, over the phone.     Images personally reviewed, interpreted and dictated by EDUARDO Grey.                             This report was signed and finalized on 8/3/2023 1:28 PM by EDUARDO Grey.               Echo:    Results for orders placed during the hospital encounter of 08/03/23    Adult Transthoracic Echo Complete w/ Color, Spectral and Contrast if necessary per protocol    Interpretation Summary    Left ventricular systolic function is normal. Estimated left ventricular EF = 64% Left ventricular ejection fraction appears to be 61 - 65%.    Left ventricular diastolic function was normal.    Condition on Discharge:      Stable.    Code status during the hospital stay:    Code Status and Medical Interventions:   Ordered at: 08/03/23 1614     Level Of Support Discussed With:    Patient     Code Status (Patient has no pulse and is not breathing):    CPR (Attempt to Resuscitate)     Medical Interventions (Patient has pulse or is breathing):    Full Support     Release to patient:    Routine Release     Discharge Disposition:    Home or Self Care    Discharge Medications:       Discharge Medications        New Medications        Instructions Start Date   apixaban 5 MG tablet tablet  Commonly known as: ELIQUIS   10 mg, Oral, Every 12 Hours Scheduled      apixaban 5 MG tablet tablet  Commonly known as: ELIQUIS   5 mg, Oral, Every 12 Hours Scheduled   Start Date: August 11, 2023     oxyCODONE-acetaminophen 5-325 MG per tablet  Commonly known as: PERCOCET   1 tablet, Oral, Every 8 Hours PRN             Continue These Medications        Instructions Start Date   allopurinol 300 MG tablet  Commonly known as: ZYLOPRIM   300 mg, Oral, Daily      azelastine 0.1 % nasal spray  Commonly known as: ASTELIN   One spray each nostril at night as needed  PT stated only uses as needed      buPROPion XL  300 MG 24 hr tablet  Commonly known as: WELLBUTRIN XL   300 mg, Oral, Daily      ferrous sulfate 325 (65 FE) MG tablet   325 mg, Oral, Daily With Breakfast      fluticasone 50 MCG/ACT nasal spray  Commonly known as: FLONASE   INHALE 2 SPRAYS BY INTRANASAL ROUTE EVERY DAY IN EACH NOSTRIL      Hydrocortisone (Perianal) 2.5 % rectal cream  Commonly known as: ANUSOL-HC   Rectal, 2 Times Daily PRN      levothyroxine 50 MCG tablet  Commonly known as: SYNTHROID, LEVOTHROID   50 mcg, Oral, Every Morning      lisinopril-hydrochlorothiazide 20-25 MG per tablet  Commonly known as: PRINZIDE,ZESTORETIC   Take 1 tablet by mouth Daily.      metFORMIN  MG 24 hr tablet  Commonly known as: GLUCOPHAGE-XR   500 mg, Daily With Breakfast      ondansetron 4 MG tablet  Commonly known as: ZOFRAN   4 mg, Oral, As Needed      pantoprazole 40 MG EC tablet  Commonly known as: PROTONIX   40 mg, Oral, Daily      spironolactone 100 MG tablet  Commonly known as: ALDACTONE   100 mg, Oral, Daily      tamsulosin 0.4 MG capsule 24 hr capsule  Commonly known as: FLOMAX   1 capsule, Oral, Daily, 2 CAPSULES BY MOUTH DAILY             Discharge Diet:   Cardiac    Activity at Discharge:   As tolerated    Follow-up Appointments:     Follow-up Information       Maame Higgins, DO Follow up in 3 day(s).    Specialty: Family Medicine  Contact information:  [address] [facility] [address]  [phone]               Lou Luo MD Follow up in 1 week(s).    Specialty: Pulmonary Disease  Contact information:  [address] [facility] [address]  [facility] [address]  [facility] [address]  [phone]                           Future Appointments   Date Time Provider Department Center   7/8/2024  1:45 PM Eileen Boggs MD [facility]     Test Results Pending at Discharge:           Adonay Vásquez MD  08/05/23  08:58 EDT    Time: I spent 36 minutes on this discharge activity which included: face-to-face encounter with the patient, reviewing the  data in the system, coordination of the care with the nursing staff as well as consultants, documentation, and entering orders.     Dictated utilizing Dragon dictation.

## 2023-08-22 ENCOUNTER — DISEASE STATE MANAGEMENT VISIT (OUTPATIENT)
Dept: PHARMACY | Facility: HOSPITAL | Age: 53
End: 2023-08-22
Payer: COMMERCIAL

## 2023-08-22 VITALS
SYSTOLIC BLOOD PRESSURE: 164 MMHG | HEIGHT: 69 IN | BODY MASS INDEX: 41.03 KG/M2 | OXYGEN SATURATION: 98 % | WEIGHT: 277 LBS | HEART RATE: 92 BPM | DIASTOLIC BLOOD PRESSURE: 84 MMHG

## 2023-08-22 DIAGNOSIS — R91.8 LUNG NODULES: ICD-10-CM

## 2023-08-22 DIAGNOSIS — I26.99 ACUTE PULMONARY EMBOLISM, UNSPECIFIED PULMONARY EMBOLISM TYPE, UNSPECIFIED WHETHER ACUTE COR PULMONALE PRESENT: Primary | ICD-10-CM

## 2023-08-22 DIAGNOSIS — I26.99 PULMONARY INFARCT: ICD-10-CM

## 2023-08-22 PROCEDURE — G0463 HOSPITAL OUTPT CLINIC VISIT: HCPCS | Performed by: NURSE PRACTITIONER

## 2023-08-22 RX ORDER — SULFAMETHOXAZOLE AND TRIMETHOPRIM 800; 160 MG/1; MG/1
TABLET ORAL
COMMUNITY

## 2023-08-22 NOTE — PROGRESS NOTES
Follow Up Office Visit      Patient Name: Ladarius Jones    Chief Complaint: Hospital follow-up      History of Present Illness: Ladarius Jones is a 52 y.o. male who is here today for follow up of recent hospitalization at [facility], at which time acute bilateral pulmonary emboli with right lung infarct was identified.  Patient was treated for his right lower lateral chest discomfort and was placed on Eliquis. The patient does have stage IV colon cancer with liver metastases, which is managed at [facility] where he has visits every 2 weeks.  Continues on palliative chemotherapy.  Patient's thoracic pain has now resolved.  He is breathing well and denies any dyspnea.  No adverse bleeding on Eliquis.    Supplemental Oxygen: No    Subjective      Review of Systems:  Review of Systems   Constitutional:  Negative for fever and unexpected weight change.   Respiratory:  Negative for cough, shortness of breath and wheezing.    Cardiovascular:  Negative for chest pain and leg swelling.      Past Medical History:   Past Medical History:   Diagnosis Date    Allergic rhinitis     Anemia     Benign prostate hyperplasia     Depression     Diabetes mellitus     Hypertension     Hyperuricemia     Osteoarthritis     Sleep apnea        Past Surgical History:   Past Surgical History:   Procedure Laterality Date    COLONOSCOPY N/A 5/8/2023    Procedure: COLONOSCOPY WITH POLYPECTOMY AND BIOPSY AND TATTOO;  Surgeon: Eileen Boggs MD;  Location: [facility] ENDOSCOPY;  Service: Gastroenterology;  Laterality: N/A;    ENDOSCOPY N/A 5/8/2023    Procedure: ESOPHAGOGASTRODUODENOSCOPY WITH BIOPSY;  Surgeon: Eileen Boggs MD;  Location: [facility] ENDOSCOPY;  Service: Gastroenterology;  Laterality: N/A;    KNEE SURGERY Left     ROTATOR CUFF REPAIR Left     WRIST SURGERY Right        Family History:   Family History   Problem Relation Age of Onset    Asthma Mother     Diabetes Mother     Asthma Father      Diabetes Father     Heart disease Father     Hypertension Father     Stroke Father     Colon cancer Neg Hx        Social History:   Social History     Socioeconomic History    Marital status:    Tobacco Use    Smoking status: Never     Passive exposure: Never    Smokeless tobacco: Never   Vaping Use    Vaping Use: Never used   Substance and Sexual Activity    Alcohol use: Yes     Comment: occasional    Drug use: No    Sexual activity: Defer       Current Medications:     Current Outpatient Medications:     allopurinol (ZYLOPRIM) 300 MG tablet, Take 1 tablet by mouth Daily., Disp: , Rfl:     apixaban (ELIQUIS) 5 MG tablet tablet, Take 1 tablet by mouth Every 12 (Twelve) Hours. Indications: DVT/PE (active thrombosis), Disp: 60 tablet, Rfl: 2    azelastine (ASTELIN) 0.1 % nasal spray, One spray each nostril at night as needed  PT stated only uses as needed, Disp: , Rfl:     buPROPion XL (WELLBUTRIN XL) 300 MG 24 hr tablet, Take 1 tablet by mouth Daily., Disp: , Rfl:     ferrous sulfate 325 (65 FE) MG tablet, Take 1 tablet by mouth Daily With Breakfast., Disp: , Rfl:     fluticasone (FLONASE) 50 MCG/ACT nasal spray, , Disp: , Rfl: 5    Hydrocortisone, Perianal, (ANUSOL-HC) 2.5 % rectal cream, Insert  into the rectum 2 (Two) Times a Day As Needed for Hemorrhoids., Disp: 28 g, Rfl: 1    levothyroxine (SYNTHROID, LEVOTHROID) 50 MCG tablet, Take 1 tablet by mouth Every Morning., Disp: , Rfl:     lisinopril-hydrochlorothiazide (PRINZIDE,ZESTORETIC) 20-25 MG per tablet, Take 1 tablet by mouth Daily., Disp: , Rfl: 5    metFORMIN ER (GLUCOPHAGE-XR) 500 MG 24 hr tablet, Take 1 tablet by mouth Daily With Breakfast., Disp: , Rfl:     ondansetron (ZOFRAN) 4 MG tablet, Take 1 tablet by mouth As Needed., Disp: , Rfl:     pantoprazole (PROTONIX) 40 MG EC tablet, Take 1 tablet by mouth Daily., Disp: 30 tablet, Rfl: 5    spironolactone (ALDACTONE) 100 MG tablet, Take 1 tablet by mouth Daily., Disp: , Rfl: 2    tamsulosin  "(FLOMAX) 0.4 MG capsule 24 hr capsule, Take 1 capsule by mouth Daily. 2 CAPSULES BY MOUTH DAILY, Disp: , Rfl:     sulfamethoxazole-trimethoprim (BACTRIM DS,SEPTRA DS) 800-160 MG per tablet, take 1 tablet by mouth every 12 hours for 5 days (Patient not taking: Reported on 8/22/2023), Disp: , Rfl:      Allergies:   No Known Allergies    Objective     Physical Exam:  Vital Signs:   Vitals:    08/22/23 1325   BP: 164/84   BP Location: Right arm   Patient Position: Sitting   Cuff Size: Adult   Pulse: 92   SpO2: 98%   Weight: 126 kg (277 lb)   Height: 175.3 cm (69\")     Body mass index is 40.91 kg/mý.    Physical Exam  Vitals reviewed.   Constitutional:       General: He is not in acute distress.     Appearance: He is not toxic-appearing.   HENT:      Head: Normocephalic and atraumatic.      Mouth/Throat:      Mouth: Mucous membranes are moist.   Eyes:      Extraocular Movements: Extraocular movements intact.      Conjunctiva/sclera: Conjunctivae normal.      Pupils: Pupils are equal, round, and reactive to light.   Cardiovascular:      Rate and Rhythm: Normal rate.      Heart sounds: Normal heart sounds.   Pulmonary:      Effort: Pulmonary effort is normal.      Breath sounds: Normal breath sounds.   Abdominal:      General: There is no distension.      Palpations: Abdomen is soft.   Musculoskeletal:         General: No swelling.      Cervical back: Neck supple.   Skin:     General: Skin is warm and dry.      Findings: No rash.   Neurological:      General: No focal deficit present.      Mental Status: He is oriented to person, place, and time.   Psychiatric:         Mood and Affect: Mood normal.         Behavior: Behavior normal.     Results Review:   August 2023 bilateral lower extremity venous duplex showed left DVT above the knee.    August 2023 CT of chest, abdomen and pelvis with contrast showed multiple large intraluminal filling defects in the right lower lobe lobar, segmental and subsegmental pulmonary arteries, " consistent with acute pulmonary embolism. Right lower lobe large area of wedge-shaped opacity, compatible with pulmonary infarct. There are few  subsegmental pulmonary arterial filling defects in the left lower lobe, compatible with acute pulmonary emboli. There is evidence of right heart strain with reversal of interventricular septum. Correlate clinically. There is a 5 mm pulmonary nodule in the left lower lobe. Few tiny pulmonary nodules in the right upper lobe.  Innumerable hypodense hepatic lesions, compatible with metastases. Indeterminate 13 mm right adrenal nodule, concerning for metastasis. No definite colonic mass is identified. Nonobstructing bilateral nephrolithiasis.    Results for orders placed during the hospital encounter of 08/03/23    Adult Transthoracic Echo Complete w/ Color, Spectral and Contrast if necessary per protocol    Interpretation Summary    Left ventricular systolic function is normal. Estimated left ventricular EF = 64% Left ventricular ejection fraction appears to be 61 - 65%.    Left ventricular diastolic function was normal.    Assessment / Plan      Assessment/Plan:   Diagnoses and all orders for this visit:    1. Acute pulmonary embolism, unspecified pulmonary embolism type, unspecified whether acute cor pulmonale present (Primary)  Most likely r/t his known malignancy. Doing well on Eliquis and should continue use. Discussed bleeding precautions. Would advise lifelong anticogulation so long as no contraindications arise.    2. Pulmonary infarct  Pain is now resolved.    3. Lung nodules  Small lung nodules noted on chest CT scan, indeterminate etiology. Patient was advised to continue following with oncology for imaging surveillance. He voiced understanding.       Follow Up:   Return if symptoms worsen or fail to improve.  The patient was counseled on diagnostic results, risks and benefits of treatment options, risk factor modifications and the importance of treatment compliance.  The patient was advised to contact the clinic with concerns or worsening symptoms.     MARTA Lopez   Pulmonary Medicine [facility]

## 2023-09-05 ENCOUNTER — LAB (OUTPATIENT)
Dept: LAB | Facility: HOSPITAL | Age: 53
End: 2023-09-05
Payer: COMMERCIAL

## 2023-09-05 ENCOUNTER — TRANSCRIBE ORDERS (OUTPATIENT)
Dept: LAB | Facility: HOSPITAL | Age: 53
End: 2023-09-05
Payer: COMMERCIAL

## 2023-09-05 DIAGNOSIS — C80.1 MALIGNANT NEOPLASM WITHOUT SPECIFICATION OF SITE: ICD-10-CM

## 2023-09-05 DIAGNOSIS — C80.1 MALIGNANT NEOPLASM WITHOUT SPECIFICATION OF SITE: Primary | ICD-10-CM

## 2023-09-05 LAB
ALBUMIN SERPL-MCNC: 3.3 G/DL (ref 3.5–5.2)
ALBUMIN/GLOB SERPL: 1.1 G/DL
ALP SERPL-CCNC: 141 U/L (ref 39–117)
ALT SERPL W P-5'-P-CCNC: 35 U/L (ref 1–41)
ANION GAP SERPL CALCULATED.3IONS-SCNC: 8.9 MMOL/L (ref 5–15)
ANISOCYTOSIS BLD QL: NORMAL
AST SERPL-CCNC: 40 U/L (ref 1–40)
BASOPHILS # BLD AUTO: 0.02 10*3/MM3 (ref 0–0.2)
BASOPHILS NFR BLD AUTO: 0.4 % (ref 0–1.5)
BILIRUB SERPL-MCNC: 0.4 MG/DL (ref 0–1.2)
BUN SERPL-MCNC: 12 MG/DL (ref 6–20)
BUN/CREAT SERPL: 13.2 (ref 7–25)
CALCIUM SPEC-SCNC: 8.9 MG/DL (ref 8.6–10.5)
CEA SERPL-MCNC: 35.1 NG/ML
CHLORIDE SERPL-SCNC: 103 MMOL/L (ref 98–107)
CO2 SERPL-SCNC: 25.1 MMOL/L (ref 22–29)
CREAT SERPL-MCNC: 0.91 MG/DL (ref 0.76–1.27)
DEPRECATED RDW RBC AUTO: 73.9 FL (ref 37–54)
EGFRCR SERPLBLD CKD-EPI 2021: 100.8 ML/MIN/1.73
EOSINOPHIL # BLD AUTO: 0.08 10*3/MM3 (ref 0–0.4)
EOSINOPHIL NFR BLD AUTO: 1.5 % (ref 0.3–6.2)
ERYTHROCYTE [DISTWIDTH] IN BLOOD BY AUTOMATED COUNT: 22.2 % (ref 12.3–15.4)
GLOBULIN UR ELPH-MCNC: 3 GM/DL
GLUCOSE SERPL-MCNC: 118 MG/DL (ref 65–99)
HCT VFR BLD AUTO: 36.2 % (ref 37.5–51)
HGB BLD-MCNC: 11.9 G/DL (ref 13–17.7)
HYPOCHROMIA BLD QL: NORMAL
IMM GRANULOCYTES # BLD AUTO: 0.01 10*3/MM3 (ref 0–0.05)
IMM GRANULOCYTES NFR BLD AUTO: 0.2 % (ref 0–0.5)
LYMPHOCYTES # BLD AUTO: 1.92 10*3/MM3 (ref 0.7–3.1)
LYMPHOCYTES NFR BLD AUTO: 35.8 % (ref 19.6–45.3)
MCH RBC QN AUTO: 30.1 PG (ref 26.6–33)
MCHC RBC AUTO-ENTMCNC: 32.9 G/DL (ref 31.5–35.7)
MCV RBC AUTO: 91.4 FL (ref 79–97)
MONOCYTES # BLD AUTO: 0.94 10*3/MM3 (ref 0.1–0.9)
MONOCYTES NFR BLD AUTO: 17.5 % (ref 5–12)
NEUTROPHILS NFR BLD AUTO: 2.39 10*3/MM3 (ref 1.7–7)
NEUTROPHILS NFR BLD AUTO: 44.6 % (ref 42.7–76)
NRBC BLD AUTO-RTO: 0 /100 WBC (ref 0–0.2)
PLATELET # BLD AUTO: 121 10*3/MM3 (ref 140–450)
PMV BLD AUTO: 10.6 FL (ref 6–12)
POIKILOCYTOSIS BLD QL SMEAR: NORMAL
POTASSIUM SERPL-SCNC: 4.4 MMOL/L (ref 3.5–5.2)
PROT SERPL-MCNC: 6.3 G/DL (ref 6–8.5)
RBC # BLD AUTO: 3.96 10*6/MM3 (ref 4.14–5.8)
SMALL PLATELETS BLD QL SMEAR: NORMAL
SODIUM SERPL-SCNC: 137 MMOL/L (ref 136–145)
WBC MORPH BLD: NORMAL
WBC NRBC COR # BLD: 5.36 10*3/MM3 (ref 3.4–10.8)

## 2023-09-05 PROCEDURE — 85025 COMPLETE CBC W/AUTO DIFF WBC: CPT

## 2023-09-05 PROCEDURE — 80053 COMPREHEN METABOLIC PANEL: CPT

## 2023-09-05 PROCEDURE — 36415 COLL VENOUS BLD VENIPUNCTURE: CPT

## 2023-09-05 PROCEDURE — 85007 BL SMEAR W/DIFF WBC COUNT: CPT

## 2023-09-05 PROCEDURE — 82378 CARCINOEMBRYONIC ANTIGEN: CPT

## 2023-09-19 ENCOUNTER — LAB (OUTPATIENT)
Dept: LAB | Facility: HOSPITAL | Age: 53
End: 2023-09-19
Payer: COMMERCIAL

## 2023-09-19 ENCOUNTER — TRANSCRIBE ORDERS (OUTPATIENT)
Dept: LAB | Facility: HOSPITAL | Age: 53
End: 2023-09-19
Payer: COMMERCIAL

## 2023-09-19 DIAGNOSIS — C80.1 MALIGNANT NEOPLASTIC DISEASE: ICD-10-CM

## 2023-09-19 DIAGNOSIS — C80.1 MALIGNANT NEOPLASTIC DISEASE: Primary | ICD-10-CM

## 2023-09-19 LAB
ALBUMIN SERPL-MCNC: 3.3 G/DL (ref 3.5–5.2)
ALBUMIN/GLOB SERPL: 1 G/DL
ALP SERPL-CCNC: 149 U/L (ref 39–117)
ALT SERPL W P-5'-P-CCNC: 34 U/L (ref 1–41)
ANION GAP SERPL CALCULATED.3IONS-SCNC: 11.5 MMOL/L (ref 5–15)
ANISOCYTOSIS BLD QL: NORMAL
AST SERPL-CCNC: 37 U/L (ref 1–40)
BASOPHILS # BLD AUTO: 0.03 10*3/MM3 (ref 0–0.2)
BASOPHILS NFR BLD AUTO: 0.5 % (ref 0–1.5)
BILIRUB SERPL-MCNC: 0.3 MG/DL (ref 0–1.2)
BUN SERPL-MCNC: 13 MG/DL (ref 6–20)
BUN/CREAT SERPL: 13.4 (ref 7–25)
CALCIUM SPEC-SCNC: 8.9 MG/DL (ref 8.6–10.5)
CEA SERPL-MCNC: 33.8 NG/ML
CHLORIDE SERPL-SCNC: 104 MMOL/L (ref 98–107)
CO2 SERPL-SCNC: 23.5 MMOL/L (ref 22–29)
CREAT SERPL-MCNC: 0.97 MG/DL (ref 0.76–1.27)
DEPRECATED RDW RBC AUTO: 71.2 FL (ref 37–54)
EGFRCR SERPLBLD CKD-EPI 2021: 93.3 ML/MIN/1.73
EOSINOPHIL # BLD AUTO: 0.14 10*3/MM3 (ref 0–0.4)
EOSINOPHIL NFR BLD AUTO: 2.5 % (ref 0.3–6.2)
ERYTHROCYTE [DISTWIDTH] IN BLOOD BY AUTOMATED COUNT: 21.1 % (ref 12.3–15.4)
GLOBULIN UR ELPH-MCNC: 3.2 GM/DL
GLUCOSE SERPL-MCNC: 124 MG/DL (ref 65–99)
HCT VFR BLD AUTO: 35.4 % (ref 37.5–51)
HGB BLD-MCNC: 11.6 G/DL (ref 13–17.7)
IMM GRANULOCYTES # BLD AUTO: 0.02 10*3/MM3 (ref 0–0.05)
IMM GRANULOCYTES NFR BLD AUTO: 0.4 % (ref 0–0.5)
LYMPHOCYTES # BLD AUTO: 1.97 10*3/MM3 (ref 0.7–3.1)
LYMPHOCYTES NFR BLD AUTO: 34.7 % (ref 19.6–45.3)
MCH RBC QN AUTO: 30.4 PG (ref 26.6–33)
MCHC RBC AUTO-ENTMCNC: 32.8 G/DL (ref 31.5–35.7)
MCV RBC AUTO: 92.7 FL (ref 79–97)
MONOCYTES # BLD AUTO: 0.89 10*3/MM3 (ref 0.1–0.9)
MONOCYTES NFR BLD AUTO: 15.7 % (ref 5–12)
NEUTROPHILS NFR BLD AUTO: 2.62 10*3/MM3 (ref 1.7–7)
NEUTROPHILS NFR BLD AUTO: 46.2 % (ref 42.7–76)
NRBC BLD AUTO-RTO: 0 /100 WBC (ref 0–0.2)
PLATELET # BLD AUTO: 115 10*3/MM3 (ref 140–450)
PMV BLD AUTO: 10.9 FL (ref 6–12)
POIKILOCYTOSIS BLD QL SMEAR: NORMAL
POTASSIUM SERPL-SCNC: 4.2 MMOL/L (ref 3.5–5.2)
PROT SERPL-MCNC: 6.5 G/DL (ref 6–8.5)
RBC # BLD AUTO: 3.82 10*6/MM3 (ref 4.14–5.8)
SMALL PLATELETS BLD QL SMEAR: NORMAL
SODIUM SERPL-SCNC: 139 MMOL/L (ref 136–145)
WBC MORPH BLD: NORMAL
WBC NRBC COR # BLD: 5.67 10*3/MM3 (ref 3.4–10.8)

## 2023-09-19 PROCEDURE — 82378 CARCINOEMBRYONIC ANTIGEN: CPT

## 2023-09-19 PROCEDURE — 80053 COMPREHEN METABOLIC PANEL: CPT

## 2023-09-19 PROCEDURE — 85007 BL SMEAR W/DIFF WBC COUNT: CPT

## 2023-09-19 PROCEDURE — 85025 COMPLETE CBC W/AUTO DIFF WBC: CPT

## 2023-09-19 PROCEDURE — 36415 COLL VENOUS BLD VENIPUNCTURE: CPT

## 2023-10-03 ENCOUNTER — LAB (OUTPATIENT)
Dept: LAB | Facility: HOSPITAL | Age: 53
End: 2023-10-03
Payer: COMMERCIAL

## 2023-10-03 ENCOUNTER — TRANSCRIBE ORDERS (OUTPATIENT)
Dept: LAB | Facility: HOSPITAL | Age: 53
End: 2023-10-03
Payer: COMMERCIAL

## 2023-10-03 DIAGNOSIS — C18.6 MALIGNANT NEOPLASM OF DESCENDING COLON: ICD-10-CM

## 2023-10-03 DIAGNOSIS — C18.6 MALIGNANT NEOPLASM OF DESCENDING COLON: Primary | ICD-10-CM

## 2023-10-03 LAB
ALBUMIN SERPL-MCNC: 3.4 G/DL (ref 3.5–5.2)
ALBUMIN/GLOB SERPL: 1 G/DL
ALP SERPL-CCNC: 163 U/L (ref 39–117)
ALT SERPL W P-5'-P-CCNC: 42 U/L (ref 1–41)
ANION GAP SERPL CALCULATED.3IONS-SCNC: 9.8 MMOL/L (ref 5–15)
AST SERPL-CCNC: 48 U/L (ref 1–40)
BASOPHILS # BLD AUTO: 0.02 10*3/MM3 (ref 0–0.2)
BASOPHILS NFR BLD AUTO: 0.4 % (ref 0–1.5)
BILIRUB SERPL-MCNC: 0.4 MG/DL (ref 0–1.2)
BILIRUB UR QL STRIP: NEGATIVE
BUN SERPL-MCNC: 14 MG/DL (ref 6–20)
BUN/CREAT SERPL: 15.6 (ref 7–25)
CALCIUM SPEC-SCNC: 9 MG/DL (ref 8.6–10.5)
CHLORIDE SERPL-SCNC: 106 MMOL/L (ref 98–107)
CLARITY UR: CLEAR
CO2 SERPL-SCNC: 22.2 MMOL/L (ref 22–29)
COLOR UR: YELLOW
CREAT SERPL-MCNC: 0.9 MG/DL (ref 0.76–1.27)
DEPRECATED RDW RBC AUTO: 66.8 FL (ref 37–54)
EGFRCR SERPLBLD CKD-EPI 2021: 101.5 ML/MIN/1.73
EOSINOPHIL # BLD AUTO: 0.1 10*3/MM3 (ref 0–0.4)
EOSINOPHIL NFR BLD AUTO: 1.9 % (ref 0.3–6.2)
ERYTHROCYTE [DISTWIDTH] IN BLOOD BY AUTOMATED COUNT: 19.8 % (ref 12.3–15.4)
GLOBULIN UR ELPH-MCNC: 3.4 GM/DL
GLUCOSE SERPL-MCNC: 106 MG/DL (ref 65–99)
GLUCOSE UR STRIP-MCNC: NEGATIVE MG/DL
HCT VFR BLD AUTO: 35.3 % (ref 37.5–51)
HGB BLD-MCNC: 11.8 G/DL (ref 13–17.7)
HGB UR QL STRIP.AUTO: ABNORMAL
IMM GRANULOCYTES # BLD AUTO: 0.02 10*3/MM3 (ref 0–0.05)
IMM GRANULOCYTES NFR BLD AUTO: 0.4 % (ref 0–0.5)
KETONES UR QL STRIP: NEGATIVE
LEUKOCYTE ESTERASE UR QL STRIP.AUTO: NEGATIVE
LYMPHOCYTES # BLD AUTO: 1.5 10*3/MM3 (ref 0.7–3.1)
LYMPHOCYTES NFR BLD AUTO: 28.2 % (ref 19.6–45.3)
MCH RBC QN AUTO: 31 PG (ref 26.6–33)
MCHC RBC AUTO-ENTMCNC: 33.4 G/DL (ref 31.5–35.7)
MCV RBC AUTO: 92.7 FL (ref 79–97)
MONOCYTES # BLD AUTO: 0.84 10*3/MM3 (ref 0.1–0.9)
MONOCYTES NFR BLD AUTO: 15.8 % (ref 5–12)
NEUTROPHILS NFR BLD AUTO: 2.83 10*3/MM3 (ref 1.7–7)
NEUTROPHILS NFR BLD AUTO: 53.3 % (ref 42.7–76)
NITRITE UR QL STRIP: NEGATIVE
NRBC BLD AUTO-RTO: 0 /100 WBC (ref 0–0.2)
PH UR STRIP.AUTO: 5.5 [PH] (ref 5–8)
PLATELET # BLD AUTO: 103 10*3/MM3 (ref 140–450)
PMV BLD AUTO: 11.5 FL (ref 6–12)
POTASSIUM SERPL-SCNC: 4.4 MMOL/L (ref 3.5–5.2)
PROT SERPL-MCNC: 6.8 G/DL (ref 6–8.5)
PROT UR QL STRIP: NEGATIVE
RBC # BLD AUTO: 3.81 10*6/MM3 (ref 4.14–5.8)
SODIUM SERPL-SCNC: 138 MMOL/L (ref 136–145)
SP GR UR STRIP: 1.02 (ref 1–1.03)
UROBILINOGEN UR QL STRIP: ABNORMAL
WBC NRBC COR # BLD: 5.31 10*3/MM3 (ref 3.4–10.8)

## 2023-10-03 PROCEDURE — 81003 URINALYSIS AUTO W/O SCOPE: CPT

## 2023-10-03 PROCEDURE — 36415 COLL VENOUS BLD VENIPUNCTURE: CPT

## 2023-10-30 RX ORDER — PANTOPRAZOLE SODIUM 40 MG/1
40 TABLET, DELAYED RELEASE ORAL DAILY
Qty: 90 TABLET | Refills: 1 | Status: SHIPPED | OUTPATIENT
Start: 2023-10-30

## 2023-11-14 ENCOUNTER — LAB (OUTPATIENT)
Dept: LAB | Facility: HOSPITAL | Age: 53
End: 2023-11-14
Payer: COMMERCIAL

## 2023-11-14 ENCOUNTER — TRANSCRIBE ORDERS (OUTPATIENT)
Dept: LAB | Facility: HOSPITAL | Age: 53
End: 2023-11-14
Payer: COMMERCIAL

## 2023-11-14 DIAGNOSIS — C18.6 MALIGNANT NEOPLASM OF DESCENDING COLON: Primary | ICD-10-CM

## 2023-11-14 DIAGNOSIS — C18.6 MALIGNANT NEOPLASM OF DESCENDING COLON: ICD-10-CM

## 2023-11-14 LAB
ALBUMIN SERPL-MCNC: 3.5 G/DL (ref 3.5–5.2)
ALBUMIN/GLOB SERPL: 0.9 G/DL
ALP SERPL-CCNC: 164 U/L (ref 39–117)
ALT SERPL W P-5'-P-CCNC: 45 U/L (ref 1–41)
ANION GAP SERPL CALCULATED.3IONS-SCNC: 8 MMOL/L (ref 5–15)
AST SERPL-CCNC: 45 U/L (ref 1–40)
BASOPHILS # BLD AUTO: 0.02 10*3/MM3 (ref 0–0.2)
BASOPHILS NFR BLD AUTO: 0.4 % (ref 0–1.5)
BILIRUB SERPL-MCNC: 0.5 MG/DL (ref 0–1.2)
BUN SERPL-MCNC: 14 MG/DL (ref 6–20)
BUN/CREAT SERPL: 15.9 (ref 7–25)
CALCIUM SPEC-SCNC: 9.2 MG/DL (ref 8.6–10.5)
CEA SERPL-MCNC: 17.3 NG/ML
CHLORIDE SERPL-SCNC: 102 MMOL/L (ref 98–107)
CO2 SERPL-SCNC: 26 MMOL/L (ref 22–29)
CREAT SERPL-MCNC: 0.88 MG/DL (ref 0.76–1.27)
DEPRECATED RDW RBC AUTO: 62.8 FL (ref 37–54)
EGFRCR SERPLBLD CKD-EPI 2021: 102.2 ML/MIN/1.73
EOSINOPHIL # BLD AUTO: 0.09 10*3/MM3 (ref 0–0.4)
EOSINOPHIL NFR BLD AUTO: 1.6 % (ref 0.3–6.2)
ERYTHROCYTE [DISTWIDTH] IN BLOOD BY AUTOMATED COUNT: 18.1 % (ref 12.3–15.4)
GLOBULIN UR ELPH-MCNC: 3.7 GM/DL
GLUCOSE SERPL-MCNC: 128 MG/DL (ref 65–99)
HCT VFR BLD AUTO: 37.2 % (ref 37.5–51)
HGB BLD-MCNC: 12.3 G/DL (ref 13–17.7)
IMM GRANULOCYTES # BLD AUTO: 0 10*3/MM3 (ref 0–0.05)
IMM GRANULOCYTES NFR BLD AUTO: 0 % (ref 0–0.5)
LYMPHOCYTES # BLD AUTO: 1.72 10*3/MM3 (ref 0.7–3.1)
LYMPHOCYTES NFR BLD AUTO: 31.4 % (ref 19.6–45.3)
MCH RBC QN AUTO: 31.7 PG (ref 26.6–33)
MCHC RBC AUTO-ENTMCNC: 33.1 G/DL (ref 31.5–35.7)
MCV RBC AUTO: 95.9 FL (ref 79–97)
MONOCYTES # BLD AUTO: 0.94 10*3/MM3 (ref 0.1–0.9)
MONOCYTES NFR BLD AUTO: 17.2 % (ref 5–12)
NEUTROPHILS NFR BLD AUTO: 2.71 10*3/MM3 (ref 1.7–7)
NEUTROPHILS NFR BLD AUTO: 49.4 % (ref 42.7–76)
NRBC BLD AUTO-RTO: 0 /100 WBC (ref 0–0.2)
PLATELET # BLD AUTO: 104 10*3/MM3 (ref 140–450)
PMV BLD AUTO: 11.7 FL (ref 6–12)
POTASSIUM SERPL-SCNC: 4.2 MMOL/L (ref 3.5–5.2)
PROT SERPL-MCNC: 7.2 G/DL (ref 6–8.5)
RBC # BLD AUTO: 3.88 10*6/MM3 (ref 4.14–5.8)
SODIUM SERPL-SCNC: 136 MMOL/L (ref 136–145)
WBC NRBC COR # BLD: 5.48 10*3/MM3 (ref 3.4–10.8)

## 2023-11-14 PROCEDURE — 36415 COLL VENOUS BLD VENIPUNCTURE: CPT

## 2023-11-14 PROCEDURE — 82378 CARCINOEMBRYONIC ANTIGEN: CPT

## 2023-11-14 PROCEDURE — 85025 COMPLETE CBC W/AUTO DIFF WBC: CPT

## 2023-11-14 PROCEDURE — 80053 COMPREHEN METABOLIC PANEL: CPT

## 2023-12-26 ENCOUNTER — LAB (OUTPATIENT)
Dept: LAB | Facility: HOSPITAL | Age: 53
End: 2023-12-26
Payer: COMMERCIAL

## 2023-12-26 ENCOUNTER — TRANSCRIBE ORDERS (OUTPATIENT)
Dept: LAB | Facility: HOSPITAL | Age: 53
End: 2023-12-26
Payer: COMMERCIAL

## 2023-12-26 DIAGNOSIS — C18.6 MALIGNANT NEOPLASM OF DESCENDING COLON: Primary | ICD-10-CM

## 2023-12-26 DIAGNOSIS — C18.6 MALIGNANT NEOPLASM OF DESCENDING COLON: ICD-10-CM

## 2023-12-26 LAB
ALBUMIN SERPL-MCNC: 3.5 G/DL (ref 3.5–5.2)
ALBUMIN/GLOB SERPL: 1.1 G/DL
ALP SERPL-CCNC: 174 U/L (ref 39–117)
ALT SERPL W P-5'-P-CCNC: 66 U/L (ref 1–41)
ANION GAP SERPL CALCULATED.3IONS-SCNC: 10 MMOL/L (ref 5–15)
AST SERPL-CCNC: 56 U/L (ref 1–40)
BASOPHILS # BLD AUTO: 0.02 10*3/MM3 (ref 0–0.2)
BASOPHILS NFR BLD AUTO: 0.4 % (ref 0–1.5)
BILIRUB SERPL-MCNC: 0.6 MG/DL (ref 0–1.2)
BILIRUB UR QL STRIP: NEGATIVE
BUN SERPL-MCNC: 13 MG/DL (ref 6–20)
BUN/CREAT SERPL: 12.3 (ref 7–25)
CALCIUM SPEC-SCNC: 8.8 MG/DL (ref 8.6–10.5)
CEA SERPL-MCNC: 7.15 NG/ML
CHLORIDE SERPL-SCNC: 104 MMOL/L (ref 98–107)
CLARITY UR: CLEAR
CO2 SERPL-SCNC: 24 MMOL/L (ref 22–29)
COLOR UR: ABNORMAL
CREAT SERPL-MCNC: 1.06 MG/DL (ref 0.76–1.27)
DEPRECATED RDW RBC AUTO: 56.5 FL (ref 37–54)
EGFRCR SERPLBLD CKD-EPI 2021: 83.4 ML/MIN/1.73
EOSINOPHIL # BLD AUTO: 0.11 10*3/MM3 (ref 0–0.4)
EOSINOPHIL NFR BLD AUTO: 2.2 % (ref 0.3–6.2)
ERYTHROCYTE [DISTWIDTH] IN BLOOD BY AUTOMATED COUNT: 16.2 % (ref 12.3–15.4)
GLOBULIN UR ELPH-MCNC: 3.1 GM/DL
GLUCOSE SERPL-MCNC: 131 MG/DL (ref 65–99)
GLUCOSE UR STRIP-MCNC: NEGATIVE MG/DL
HCT VFR BLD AUTO: 36.6 % (ref 37.5–51)
HGB BLD-MCNC: 12.6 G/DL (ref 13–17.7)
HGB UR QL STRIP.AUTO: ABNORMAL
IMM GRANULOCYTES # BLD AUTO: 0.01 10*3/MM3 (ref 0–0.05)
IMM GRANULOCYTES NFR BLD AUTO: 0.2 % (ref 0–0.5)
KETONES UR QL STRIP: ABNORMAL
LEUKOCYTE ESTERASE UR QL STRIP.AUTO: NEGATIVE
LYMPHOCYTES # BLD AUTO: 1.64 10*3/MM3 (ref 0.7–3.1)
LYMPHOCYTES NFR BLD AUTO: 32.9 % (ref 19.6–45.3)
MCH RBC QN AUTO: 32.8 PG (ref 26.6–33)
MCHC RBC AUTO-ENTMCNC: 34.4 G/DL (ref 31.5–35.7)
MCV RBC AUTO: 95.3 FL (ref 79–97)
MONOCYTES # BLD AUTO: 0.58 10*3/MM3 (ref 0.1–0.9)
MONOCYTES NFR BLD AUTO: 11.6 % (ref 5–12)
NEUTROPHILS NFR BLD AUTO: 2.62 10*3/MM3 (ref 1.7–7)
NEUTROPHILS NFR BLD AUTO: 52.7 % (ref 42.7–76)
NITRITE UR QL STRIP: NEGATIVE
NRBC BLD AUTO-RTO: 0 /100 WBC (ref 0–0.2)
PH UR STRIP.AUTO: 6 [PH] (ref 5–8)
PLATELET # BLD AUTO: 91 10*3/MM3 (ref 140–450)
PMV BLD AUTO: 9.8 FL (ref 6–12)
POTASSIUM SERPL-SCNC: 4.3 MMOL/L (ref 3.5–5.2)
PROT SERPL-MCNC: 6.6 G/DL (ref 6–8.5)
PROT UR QL STRIP: NEGATIVE
RBC # BLD AUTO: 3.84 10*6/MM3 (ref 4.14–5.8)
SODIUM SERPL-SCNC: 138 MMOL/L (ref 136–145)
SP GR UR STRIP: 1.02 (ref 1–1.03)
UROBILINOGEN UR QL STRIP: ABNORMAL
WBC NRBC COR # BLD AUTO: 4.98 10*3/MM3 (ref 3.4–10.8)

## 2023-12-26 PROCEDURE — 81003 URINALYSIS AUTO W/O SCOPE: CPT

## 2023-12-26 PROCEDURE — 82378 CARCINOEMBRYONIC ANTIGEN: CPT

## 2023-12-26 PROCEDURE — 80053 COMPREHEN METABOLIC PANEL: CPT

## 2023-12-26 PROCEDURE — 85025 COMPLETE CBC W/AUTO DIFF WBC: CPT

## 2023-12-26 PROCEDURE — 36415 COLL VENOUS BLD VENIPUNCTURE: CPT

## 2024-01-09 ENCOUNTER — TRANSCRIBE ORDERS (OUTPATIENT)
Dept: LAB | Facility: HOSPITAL | Age: 54
End: 2024-01-09
Payer: COMMERCIAL

## 2024-01-09 ENCOUNTER — LAB (OUTPATIENT)
Dept: LAB | Facility: HOSPITAL | Age: 54
End: 2024-01-09
Payer: COMMERCIAL

## 2024-01-09 DIAGNOSIS — C18.6 MALIGNANT NEOPLASM OF DESCENDING COLON: ICD-10-CM

## 2024-01-09 DIAGNOSIS — C18.6 MALIGNANT NEOPLASM OF DESCENDING COLON: Primary | ICD-10-CM

## 2024-01-09 LAB
ALBUMIN SERPL-MCNC: 3.7 G/DL (ref 3.5–5.2)
ALBUMIN/GLOB SERPL: 1.4 G/DL
ALP SERPL-CCNC: 158 U/L (ref 39–117)
ALT SERPL W P-5'-P-CCNC: 54 U/L (ref 1–41)
ANION GAP SERPL CALCULATED.3IONS-SCNC: 8.2 MMOL/L (ref 5–15)
AST SERPL-CCNC: 49 U/L (ref 1–40)
BASOPHILS # BLD AUTO: 0.02 10*3/MM3 (ref 0–0.2)
BASOPHILS NFR BLD AUTO: 0.4 % (ref 0–1.5)
BILIRUB SERPL-MCNC: 0.4 MG/DL (ref 0–1.2)
BUN SERPL-MCNC: 15 MG/DL (ref 6–20)
BUN/CREAT SERPL: 15.2 (ref 7–25)
CALCIUM SPEC-SCNC: 8.4 MG/DL (ref 8.6–10.5)
CEA SERPL-MCNC: 5.87 NG/ML
CHLORIDE SERPL-SCNC: 103 MMOL/L (ref 98–107)
CO2 SERPL-SCNC: 24.8 MMOL/L (ref 22–29)
CREAT SERPL-MCNC: 0.99 MG/DL (ref 0.76–1.27)
DEPRECATED RDW RBC AUTO: 56.3 FL (ref 37–54)
EGFRCR SERPLBLD CKD-EPI 2021: 90.5 ML/MIN/1.73
EOSINOPHIL # BLD AUTO: 0.11 10*3/MM3 (ref 0–0.4)
EOSINOPHIL NFR BLD AUTO: 2 % (ref 0.3–6.2)
ERYTHROCYTE [DISTWIDTH] IN BLOOD BY AUTOMATED COUNT: 16.2 % (ref 12.3–15.4)
GLOBULIN UR ELPH-MCNC: 2.6 GM/DL
GLUCOSE SERPL-MCNC: 139 MG/DL (ref 65–99)
HCT VFR BLD AUTO: 33.9 % (ref 37.5–51)
HGB BLD-MCNC: 12.1 G/DL (ref 13–17.7)
IMM GRANULOCYTES # BLD AUTO: 0.01 10*3/MM3 (ref 0–0.05)
IMM GRANULOCYTES NFR BLD AUTO: 0.2 % (ref 0–0.5)
LYMPHOCYTES # BLD AUTO: 1.85 10*3/MM3 (ref 0.7–3.1)
LYMPHOCYTES NFR BLD AUTO: 33.9 % (ref 19.6–45.3)
MCH RBC QN AUTO: 34 PG (ref 26.6–33)
MCHC RBC AUTO-ENTMCNC: 35.7 G/DL (ref 31.5–35.7)
MCV RBC AUTO: 95.2 FL (ref 79–97)
MONOCYTES # BLD AUTO: 0.62 10*3/MM3 (ref 0.1–0.9)
MONOCYTES NFR BLD AUTO: 11.4 % (ref 5–12)
NEUTROPHILS NFR BLD AUTO: 2.85 10*3/MM3 (ref 1.7–7)
NEUTROPHILS NFR BLD AUTO: 52.1 % (ref 42.7–76)
NRBC BLD AUTO-RTO: 0 /100 WBC (ref 0–0.2)
PLATELET # BLD AUTO: 97 10*3/MM3 (ref 140–450)
PMV BLD AUTO: 10.4 FL (ref 6–12)
POTASSIUM SERPL-SCNC: 4.1 MMOL/L (ref 3.5–5.2)
PROT SERPL-MCNC: 6.3 G/DL (ref 6–8.5)
RBC # BLD AUTO: 3.56 10*6/MM3 (ref 4.14–5.8)
SODIUM SERPL-SCNC: 136 MMOL/L (ref 136–145)
WBC NRBC COR # BLD AUTO: 5.46 10*3/MM3 (ref 3.4–10.8)

## 2024-01-09 PROCEDURE — 85025 COMPLETE CBC W/AUTO DIFF WBC: CPT

## 2024-01-09 PROCEDURE — 36415 COLL VENOUS BLD VENIPUNCTURE: CPT

## 2024-01-09 PROCEDURE — 80053 COMPREHEN METABOLIC PANEL: CPT

## 2024-01-09 PROCEDURE — 82378 CARCINOEMBRYONIC ANTIGEN: CPT

## 2024-01-12 NOTE — PROGRESS NOTES
Patient: Ladarius Jones    YOB: 1970    Date: 01/15/2024    Primary Care Provider: Maame Higgins DO    Chief Complaint   Patient presents with    Rectal Pain       SUBJECTIVE:    History of present illness:  Patient has a history significant for colorectal cancer, he is under the care of [facility].  Patient is here for evaluation of rectal pain which he attributes to hemorrhoids.  Patient's last colonoscopy was performed by Dr. Boggs 05/08/2023, it did show hemorrhoids, tubular adenoma colon polyps and a mass at the splenic flexure.      Earlier last year the patient had vague upper quadrant abdominal discomfort and subsequently had a CT scan of the abdomen and pelvis that showed metastatic deposits of the liver.  He subsequently had biopsies performed that showed adenocarcinoma of an unknown origin, GI did perform colonoscopy in May of last year and showed evidence of a highly suspicious lesion in the splenic flexure.  He is currently being treated with chemotherapy for metastatic colorectal carcinoma.  He did have a pulmonary embolism in August of last year due to a DVT of the lower extremity.    He now complains of blood per rectum and pain with defecation especially when he gets his chemotherapy and this is associated with constipation.    The following portions of the patient's history were reviewed and updated as appropriate: allergies, current medications, past family history, past medical history, past social history, past surgical history and problem list.      Review of Systems   Constitutional:  Negative for chills, fever and unexpected weight change.   HENT:  Negative for trouble swallowing and voice change.    Eyes:  Negative for visual disturbance.   Respiratory:  Negative for apnea, cough, chest tightness, shortness of breath and wheezing.    Cardiovascular:  Negative for chest pain, palpitations and leg swelling.   Gastrointestinal:  Positive for rectal pain.  Negative for abdominal distention, abdominal pain, anal bleeding, blood in stool, constipation, diarrhea, nausea and vomiting.   Endocrine: Negative for cold intolerance and heat intolerance.   Genitourinary:  Negative for difficulty urinating, dysuria, flank pain, scrotal swelling and testicular pain.   Musculoskeletal:  Negative for back pain, gait problem and joint swelling.   Skin:  Negative for color change, rash and wound.   Neurological:  Negative for dizziness, syncope, speech difficulty, weakness, numbness and headaches.   Hematological:  Negative for adenopathy. Does not bruise/bleed easily.   Psychiatric/Behavioral:  Negative for confusion. The patient is not nervous/anxious.        Allergies:  No Known Allergies    Medications:    Current Outpatient Medications:     allopurinol (ZYLOPRIM) 300 MG tablet, Take 1 tablet by mouth Daily., Disp: , Rfl:     apixaban (ELIQUIS) 5 MG tablet tablet, Take 1 tablet by mouth Every 12 (Twelve) Hours. Indications: DVT/PE (active thrombosis), Disp: 60 tablet, Rfl: 2    azelastine (ASTELIN) 0.1 % nasal spray, One spray each nostril at night as needed  PT stated only uses as needed, Disp: , Rfl:     buPROPion XL (WELLBUTRIN XL) 300 MG 24 hr tablet, Take 1 tablet by mouth Daily., Disp: , Rfl:     escitalopram (LEXAPRO) 5 MG tablet, TAKE 1 TABLET BY MOUTH EVERY DAY FOR 30 DAYS, Disp: , Rfl:     ferrous sulfate 325 (65 FE) MG tablet, Take 1 tablet by mouth Daily With Breakfast., Disp: , Rfl:     fluticasone (FLONASE) 50 MCG/ACT nasal spray, , Disp: , Rfl: 5    Hydrocortisone, Perianal, (ANUSOL-HC) 2.5 % rectal cream, Insert  into the rectum 2 (Two) Times a Day As Needed for Hemorrhoids., Disp: 28 g, Rfl: 1    levothyroxine (SYNTHROID, LEVOTHROID) 50 MCG tablet, Take 1 tablet by mouth Every Morning., Disp: , Rfl:     lisinopril-hydrochlorothiazide (PRINZIDE,ZESTORETIC) 20-25 MG per tablet, Take 1 tablet by mouth Daily., Disp: , Rfl: 5    metFORMIN ER (GLUCOPHAGE-XR) 500 MG 24  hr tablet, Take 1 tablet by mouth Daily With Breakfast., Disp: , Rfl:     ondansetron (ZOFRAN) 4 MG tablet, Take 1 tablet by mouth As Needed., Disp: , Rfl:     pantoprazole (PROTONIX) 40 MG EC tablet, TAKE 1 TABLET BY MOUTH EVERY DAY, Disp: 90 tablet, Rfl: 1    spironolactone (ALDACTONE) 100 MG tablet, Take 1 tablet by mouth Daily., Disp: , Rfl: 2    tamsulosin (FLOMAX) 0.4 MG capsule 24 hr capsule, Take 1 capsule by mouth Daily. 2 CAPSULES BY MOUTH DAILY, Disp: , Rfl:     sulfamethoxazole-trimethoprim (BACTRIM DS,SEPTRA DS) 800-160 MG per tablet, take 1 tablet by mouth every 12 hours for 5 days (Patient not taking: Reported on 8/22/2023), Disp: , Rfl:     History:  Past Medical History:   Diagnosis Date    Allergic rhinitis     Anemia     Benign prostate hyperplasia     Depression     Diabetes mellitus     Hypertension     Hyperuricemia     Osteoarthritis     Sleep apnea        Past Surgical History:   Procedure Laterality Date    COLONOSCOPY N/A 5/8/2023    Procedure: COLONOSCOPY WITH POLYPECTOMY AND BIOPSY AND TATTOO;  Surgeon: Eileen Boggs MD;  Location: [facility] ENDOSCOPY;  Service: Gastroenterology;  Laterality: N/A;    ENDOSCOPY N/A 5/8/2023    Procedure: ESOPHAGOGASTRODUODENOSCOPY WITH BIOPSY;  Surgeon: Eileen Boggs MD;  Location: [facility] ENDOSCOPY;  Service: Gastroenterology;  Laterality: N/A;    KNEE SURGERY Left     ROTATOR CUFF REPAIR Left     WRIST SURGERY Right        Family History   Problem Relation Age of Onset    Asthma Mother     Diabetes Mother     Asthma Father     Diabetes Father     Heart disease Father     Hypertension Father     Stroke Father     Colon cancer Neg Hx        Social History     Tobacco Use    Smoking status: Never     Passive exposure: Never    Smokeless tobacco: Never   Vaping Use    Vaping Use: Never used   Substance Use Topics    Alcohol use: Yes     Comment: occasional    Drug use: No        OBJECTIVE:    Vital Signs:   Vitals:    01/15/24 0848   BP: 138/78  "  Pulse: 78   Temp: 97.8 °F (36.6 °C)   SpO2: 97%   Weight: 132 kg (291 lb)   Height: 175.3 cm (69.02\")       Physical Exam:   General Appearance:    Alert, cooperative, in no acute distress   Head:    Normocephalic, without obvious abnormality, atraumatic   Eyes:            Lids and lashes normal, conjunctivae and sclerae normal, no   icterus, no pallor, corneas clear, PERRLA   Ears:    Ears appear intact with no abnormalities noted   Throat:   No oral lesions, no thrush, oral mucosa moist   Neck:   No adenopathy, supple, trachea midline, no thyromegaly, no   carotid bruit, no JVD   Lungs:     Clear to auscultation,respirations regular, even and                  unlabored    Heart:    Regular rhythm and normal rate, normal S1 and S2, no            murmur, no gallop, no rub, no click   Chest Wall:    No abnormalities observed   Abdomen:     Normal bowel sounds, no masses, no organomegaly, soft        non-tender, non-distended, no guarding, no rebound                tenderness   Extremities:   Moves all extremities well, no edema, no cyanosis, no             redness   Pulses:   Pulses palpable and equal bilaterally   Skin:   No bleeding, bruising or rash, there is a small external hemorrhoid tags but no obvious large external hemorrhoids, there is a small posterior anal fissure   Lymph nodes:   No palpable adenopathy   Neurologic:   Cranial nerves 2 - 12 grossly intact, sensation intact, DTR       present and equal bilaterally     Results Review:   I reviewed the patient's new clinical results.  I reviewed the patient's new imaging results and agree with the interpretation.  I reviewed the patient's other test results and agree with the interpretation    Review of Systems was reviewed and confirmed as accurate as documented by the MA.    ASSESSMENT/PLAN:    1. Malignant neoplasm of splenic flexure    2. Rectal bleed        I did have a detailed and extensive discussion with the patient in the office today and I think " his main problem is constipation associated with chemotherapy and then subsequent exacerbation of his anal fissure with blood per rectum and pain with defecation.  I have advised the patient to start on fiber supplementation daily, I do not need to perform any surgical intervention on the patient at this time.    I discussed the patients findings and my recommendations with patient, he needs to be seen back in his primary care physician's office and his oncologist office at the [facility].  I need to see the patient back only if he has further problems.        Electronically signed by Nayan Prajapati MD  01/15/24

## 2024-01-15 ENCOUNTER — OFFICE VISIT (OUTPATIENT)
Dept: SURGERY | Facility: CLINIC | Age: 54
End: 2024-01-15
Payer: COMMERCIAL

## 2024-01-15 VITALS
DIASTOLIC BLOOD PRESSURE: 78 MMHG | WEIGHT: 291 LBS | OXYGEN SATURATION: 97 % | HEART RATE: 78 BPM | TEMPERATURE: 97.8 F | BODY MASS INDEX: 43.1 KG/M2 | HEIGHT: 69 IN | SYSTOLIC BLOOD PRESSURE: 138 MMHG

## 2024-01-15 DIAGNOSIS — K62.5 RECTAL BLEED: ICD-10-CM

## 2024-01-15 DIAGNOSIS — C18.5 MALIGNANT NEOPLASM OF SPLENIC FLEXURE: Primary | ICD-10-CM

## 2024-01-15 PROCEDURE — 99204 OFFICE O/P NEW MOD 45 MIN: CPT | Performed by: SURGERY

## 2024-01-15 RX ORDER — ESCITALOPRAM OXALATE 5 MG/1
TABLET ORAL
COMMUNITY
Start: 2023-11-29

## 2024-01-23 ENCOUNTER — TRANSCRIBE ORDERS (OUTPATIENT)
Dept: LAB | Facility: HOSPITAL | Age: 54
End: 2024-01-23
Payer: COMMERCIAL

## 2024-01-23 ENCOUNTER — LAB (OUTPATIENT)
Dept: LAB | Facility: HOSPITAL | Age: 54
End: 2024-01-23
Payer: COMMERCIAL

## 2024-01-23 DIAGNOSIS — C18.6 MALIGNANT NEOPLASM OF DESCENDING COLON: Primary | ICD-10-CM

## 2024-01-23 DIAGNOSIS — C18.6 MALIGNANT NEOPLASM OF DESCENDING COLON: ICD-10-CM

## 2024-01-23 LAB
ALBUMIN SERPL-MCNC: 3.7 G/DL (ref 3.5–5.2)
ALBUMIN/GLOB SERPL: 1.3 G/DL
ALP SERPL-CCNC: 169 U/L (ref 39–117)
ALT SERPL W P-5'-P-CCNC: 48 U/L (ref 1–41)
ANION GAP SERPL CALCULATED.3IONS-SCNC: 9.2 MMOL/L (ref 5–15)
AST SERPL-CCNC: 58 U/L (ref 1–40)
BASOPHILS # BLD AUTO: 0.02 10*3/MM3 (ref 0–0.2)
BASOPHILS NFR BLD AUTO: 0.4 % (ref 0–1.5)
BILIRUB SERPL-MCNC: 0.4 MG/DL (ref 0–1.2)
BILIRUB UR QL STRIP: NEGATIVE
BUN SERPL-MCNC: 13 MG/DL (ref 6–20)
BUN/CREAT SERPL: 14.1 (ref 7–25)
CALCIUM SPEC-SCNC: 8.7 MG/DL (ref 8.6–10.5)
CEA SERPL-MCNC: 5.72 NG/ML
CHLORIDE SERPL-SCNC: 104 MMOL/L (ref 98–107)
CLARITY UR: CLEAR
CO2 SERPL-SCNC: 25.8 MMOL/L (ref 22–29)
COLOR UR: YELLOW
CREAT SERPL-MCNC: 0.92 MG/DL (ref 0.76–1.27)
DEPRECATED RDW RBC AUTO: 54.8 FL (ref 37–54)
EGFRCR SERPLBLD CKD-EPI 2021: 98.9 ML/MIN/1.73
EOSINOPHIL # BLD AUTO: 0.13 10*3/MM3 (ref 0–0.4)
EOSINOPHIL NFR BLD AUTO: 2.6 % (ref 0.3–6.2)
ERYTHROCYTE [DISTWIDTH] IN BLOOD BY AUTOMATED COUNT: 15.7 % (ref 12.3–15.4)
GLOBULIN UR ELPH-MCNC: 2.8 GM/DL
GLUCOSE SERPL-MCNC: 118 MG/DL (ref 65–99)
GLUCOSE UR STRIP-MCNC: NEGATIVE MG/DL
HCT VFR BLD AUTO: 36.2 % (ref 37.5–51)
HGB BLD-MCNC: 12.4 G/DL (ref 13–17.7)
HGB UR QL STRIP.AUTO: ABNORMAL
IMM GRANULOCYTES # BLD AUTO: 0.02 10*3/MM3 (ref 0–0.05)
IMM GRANULOCYTES NFR BLD AUTO: 0.4 % (ref 0–0.5)
KETONES UR QL STRIP: NEGATIVE
LEUKOCYTE ESTERASE UR QL STRIP.AUTO: NEGATIVE
LYMPHOCYTES # BLD AUTO: 1.7 10*3/MM3 (ref 0.7–3.1)
LYMPHOCYTES NFR BLD AUTO: 34.2 % (ref 19.6–45.3)
MCH RBC QN AUTO: 33 PG (ref 26.6–33)
MCHC RBC AUTO-ENTMCNC: 34.3 G/DL (ref 31.5–35.7)
MCV RBC AUTO: 96.3 FL (ref 79–97)
MONOCYTES # BLD AUTO: 0.76 10*3/MM3 (ref 0.1–0.9)
MONOCYTES NFR BLD AUTO: 15.3 % (ref 5–12)
NEUTROPHILS NFR BLD AUTO: 2.34 10*3/MM3 (ref 1.7–7)
NEUTROPHILS NFR BLD AUTO: 47.1 % (ref 42.7–76)
NITRITE UR QL STRIP: NEGATIVE
NRBC BLD AUTO-RTO: 0 /100 WBC (ref 0–0.2)
PH UR STRIP.AUTO: 6.5 [PH] (ref 5–8)
PLATELET # BLD AUTO: 97 10*3/MM3 (ref 140–450)
PMV BLD AUTO: 11.3 FL (ref 6–12)
POTASSIUM SERPL-SCNC: 4.3 MMOL/L (ref 3.5–5.2)
PROT SERPL-MCNC: 6.5 G/DL (ref 6–8.5)
PROT UR QL STRIP: NEGATIVE
RBC # BLD AUTO: 3.76 10*6/MM3 (ref 4.14–5.8)
SODIUM SERPL-SCNC: 139 MMOL/L (ref 136–145)
SP GR UR STRIP: 1.01 (ref 1–1.03)
UROBILINOGEN UR QL STRIP: ABNORMAL
WBC NRBC COR # BLD AUTO: 4.97 10*3/MM3 (ref 3.4–10.8)

## 2024-01-23 PROCEDURE — 81003 URINALYSIS AUTO W/O SCOPE: CPT

## 2024-01-23 PROCEDURE — 82378 CARCINOEMBRYONIC ANTIGEN: CPT

## 2024-01-23 PROCEDURE — 85025 COMPLETE CBC W/AUTO DIFF WBC: CPT

## 2024-01-23 PROCEDURE — 36415 COLL VENOUS BLD VENIPUNCTURE: CPT

## 2024-01-23 PROCEDURE — 80053 COMPREHEN METABOLIC PANEL: CPT

## 2024-02-06 ENCOUNTER — LAB (OUTPATIENT)
Dept: LAB | Facility: HOSPITAL | Age: 54
End: 2024-02-06
Payer: COMMERCIAL

## 2024-02-06 ENCOUNTER — TRANSCRIBE ORDERS (OUTPATIENT)
Dept: LAB | Facility: HOSPITAL | Age: 54
End: 2024-02-06
Payer: COMMERCIAL

## 2024-02-06 DIAGNOSIS — C18.6 MALIGNANT NEOPLASM OF DESCENDING COLON: ICD-10-CM

## 2024-02-06 DIAGNOSIS — C18.6 MALIGNANT NEOPLASM OF DESCENDING COLON: Primary | ICD-10-CM

## 2024-02-06 LAB
ALBUMIN SERPL-MCNC: 3.8 G/DL (ref 3.5–5.2)
ALBUMIN/GLOB SERPL: 1.2 G/DL
ALP SERPL-CCNC: 156 U/L (ref 39–117)
ALT SERPL W P-5'-P-CCNC: 45 U/L (ref 1–41)
ANION GAP SERPL CALCULATED.3IONS-SCNC: 6.8 MMOL/L (ref 5–15)
AST SERPL-CCNC: 43 U/L (ref 1–40)
BASOPHILS # BLD AUTO: 0.04 10*3/MM3 (ref 0–0.2)
BASOPHILS NFR BLD AUTO: 0.6 % (ref 0–1.5)
BILIRUB SERPL-MCNC: 0.6 MG/DL (ref 0–1.2)
BILIRUB UR QL STRIP: NEGATIVE
BUN SERPL-MCNC: 16 MG/DL (ref 6–20)
BUN/CREAT SERPL: 16.3 (ref 7–25)
CALCIUM SPEC-SCNC: 9.1 MG/DL (ref 8.6–10.5)
CEA SERPL-MCNC: 5.79 NG/ML
CHLORIDE SERPL-SCNC: 99 MMOL/L (ref 98–107)
CLARITY UR: CLEAR
CO2 SERPL-SCNC: 25.2 MMOL/L (ref 22–29)
COLOR UR: YELLOW
CREAT SERPL-MCNC: 0.98 MG/DL (ref 0.76–1.27)
DEPRECATED RDW RBC AUTO: 57.1 FL (ref 37–54)
EGFRCR SERPLBLD CKD-EPI 2021: 91.6 ML/MIN/1.73
EOSINOPHIL # BLD AUTO: 0.15 10*3/MM3 (ref 0–0.4)
EOSINOPHIL NFR BLD AUTO: 2.2 % (ref 0.3–6.2)
ERYTHROCYTE [DISTWIDTH] IN BLOOD BY AUTOMATED COUNT: 16 % (ref 12.3–15.4)
GLOBULIN UR ELPH-MCNC: 3.1 GM/DL
GLUCOSE SERPL-MCNC: 158 MG/DL (ref 65–99)
GLUCOSE UR STRIP-MCNC: NEGATIVE MG/DL
HCT VFR BLD AUTO: 38.7 % (ref 37.5–51)
HGB BLD-MCNC: 13.2 G/DL (ref 13–17.7)
HGB UR QL STRIP.AUTO: NEGATIVE
IMM GRANULOCYTES # BLD AUTO: 0.02 10*3/MM3 (ref 0–0.05)
IMM GRANULOCYTES NFR BLD AUTO: 0.3 % (ref 0–0.5)
KETONES UR QL STRIP: NEGATIVE
LEUKOCYTE ESTERASE UR QL STRIP.AUTO: NEGATIVE
LYMPHOCYTES # BLD AUTO: 1.88 10*3/MM3 (ref 0.7–3.1)
LYMPHOCYTES NFR BLD AUTO: 28.1 % (ref 19.6–45.3)
MCH RBC QN AUTO: 32.8 PG (ref 26.6–33)
MCHC RBC AUTO-ENTMCNC: 34.1 G/DL (ref 31.5–35.7)
MCV RBC AUTO: 96.3 FL (ref 79–97)
MONOCYTES # BLD AUTO: 0.78 10*3/MM3 (ref 0.1–0.9)
MONOCYTES NFR BLD AUTO: 11.7 % (ref 5–12)
NEUTROPHILS NFR BLD AUTO: 3.81 10*3/MM3 (ref 1.7–7)
NEUTROPHILS NFR BLD AUTO: 57.1 % (ref 42.7–76)
NITRITE UR QL STRIP: NEGATIVE
NRBC BLD AUTO-RTO: 0 /100 WBC (ref 0–0.2)
PH UR STRIP.AUTO: 5.5 [PH] (ref 5–8)
PLATELET # BLD AUTO: 145 10*3/MM3 (ref 140–450)
PMV BLD AUTO: 10.8 FL (ref 6–12)
POTASSIUM SERPL-SCNC: 4.3 MMOL/L (ref 3.5–5.2)
PROT SERPL-MCNC: 6.9 G/DL (ref 6–8.5)
PROT UR QL STRIP: NEGATIVE
RBC # BLD AUTO: 4.02 10*6/MM3 (ref 4.14–5.8)
SODIUM SERPL-SCNC: 131 MMOL/L (ref 136–145)
SP GR UR STRIP: 1.01 (ref 1–1.03)
UROBILINOGEN UR QL STRIP: NORMAL
WBC NRBC COR # BLD AUTO: 6.68 10*3/MM3 (ref 3.4–10.8)

## 2024-02-06 PROCEDURE — 82378 CARCINOEMBRYONIC ANTIGEN: CPT

## 2024-02-06 PROCEDURE — 85025 COMPLETE CBC W/AUTO DIFF WBC: CPT

## 2024-02-06 PROCEDURE — 36415 COLL VENOUS BLD VENIPUNCTURE: CPT

## 2024-02-06 PROCEDURE — 80053 COMPREHEN METABOLIC PANEL: CPT

## 2024-02-06 PROCEDURE — 81003 URINALYSIS AUTO W/O SCOPE: CPT

## 2024-02-20 ENCOUNTER — TRANSCRIBE ORDERS (OUTPATIENT)
Dept: LAB | Facility: HOSPITAL | Age: 54
End: 2024-02-20
Payer: COMMERCIAL

## 2024-02-20 ENCOUNTER — LAB (OUTPATIENT)
Dept: LAB | Facility: HOSPITAL | Age: 54
End: 2024-02-20
Payer: COMMERCIAL

## 2024-02-20 DIAGNOSIS — C18.6 MALIGNANT NEOPLASM OF DESCENDING COLON: Primary | ICD-10-CM

## 2024-02-20 DIAGNOSIS — C18.6 MALIGNANT NEOPLASM OF DESCENDING COLON: ICD-10-CM

## 2024-02-20 LAB
ALBUMIN SERPL-MCNC: 3.6 G/DL (ref 3.5–5.2)
ALBUMIN/GLOB SERPL: 1.2 G/DL
ALP SERPL-CCNC: 156 U/L (ref 39–117)
ALT SERPL W P-5'-P-CCNC: 43 U/L (ref 1–41)
ANION GAP SERPL CALCULATED.3IONS-SCNC: 8.9 MMOL/L (ref 5–15)
AST SERPL-CCNC: 44 U/L (ref 1–40)
BASOPHILS # BLD AUTO: 0.01 10*3/MM3 (ref 0–0.2)
BASOPHILS NFR BLD AUTO: 0.2 % (ref 0–1.5)
BILIRUB SERPL-MCNC: 0.6 MG/DL (ref 0–1.2)
BILIRUB UR QL STRIP: NEGATIVE
BUN SERPL-MCNC: 13 MG/DL (ref 6–20)
BUN/CREAT SERPL: 12.1 (ref 7–25)
CALCIUM SPEC-SCNC: 8.5 MG/DL (ref 8.6–10.5)
CEA SERPL-MCNC: 4.78 NG/ML
CHLORIDE SERPL-SCNC: 105 MMOL/L (ref 98–107)
CLARITY UR: CLEAR
CO2 SERPL-SCNC: 25.1 MMOL/L (ref 22–29)
COLOR UR: YELLOW
CREAT SERPL-MCNC: 1.07 MG/DL (ref 0.76–1.27)
DEPRECATED RDW RBC AUTO: 55.8 FL (ref 37–54)
EGFRCR SERPLBLD CKD-EPI 2021: 82.5 ML/MIN/1.73
EOSINOPHIL # BLD AUTO: 0.11 10*3/MM3 (ref 0–0.4)
EOSINOPHIL NFR BLD AUTO: 2.4 % (ref 0.3–6.2)
ERYTHROCYTE [DISTWIDTH] IN BLOOD BY AUTOMATED COUNT: 15.9 % (ref 12.3–15.4)
GLOBULIN UR ELPH-MCNC: 3 GM/DL
GLUCOSE SERPL-MCNC: 129 MG/DL (ref 65–99)
GLUCOSE UR STRIP-MCNC: NEGATIVE MG/DL
HCT VFR BLD AUTO: 36.9 % (ref 37.5–51)
HGB BLD-MCNC: 12.5 G/DL (ref 13–17.7)
HGB UR QL STRIP.AUTO: NEGATIVE
IMM GRANULOCYTES # BLD AUTO: 0.01 10*3/MM3 (ref 0–0.05)
IMM GRANULOCYTES NFR BLD AUTO: 0.2 % (ref 0–0.5)
KETONES UR QL STRIP: ABNORMAL
LEUKOCYTE ESTERASE UR QL STRIP.AUTO: NEGATIVE
LYMPHOCYTES # BLD AUTO: 1.65 10*3/MM3 (ref 0.7–3.1)
LYMPHOCYTES NFR BLD AUTO: 35.8 % (ref 19.6–45.3)
MCH RBC QN AUTO: 32.6 PG (ref 26.6–33)
MCHC RBC AUTO-ENTMCNC: 33.9 G/DL (ref 31.5–35.7)
MCV RBC AUTO: 96.1 FL (ref 79–97)
MONOCYTES # BLD AUTO: 0.7 10*3/MM3 (ref 0.1–0.9)
MONOCYTES NFR BLD AUTO: 15.2 % (ref 5–12)
NEUTROPHILS NFR BLD AUTO: 2.13 10*3/MM3 (ref 1.7–7)
NEUTROPHILS NFR BLD AUTO: 46.2 % (ref 42.7–76)
NITRITE UR QL STRIP: NEGATIVE
NRBC BLD AUTO-RTO: 0 /100 WBC (ref 0–0.2)
PH UR STRIP.AUTO: 6 [PH] (ref 5–8)
PLATELET # BLD AUTO: 101 10*3/MM3 (ref 140–450)
PMV BLD AUTO: 10.2 FL (ref 6–12)
POTASSIUM SERPL-SCNC: 4.4 MMOL/L (ref 3.5–5.2)
PROT SERPL-MCNC: 6.6 G/DL (ref 6–8.5)
PROT UR QL STRIP: NEGATIVE
RBC # BLD AUTO: 3.84 10*6/MM3 (ref 4.14–5.8)
SODIUM SERPL-SCNC: 139 MMOL/L (ref 136–145)
SP GR UR STRIP: 1.02 (ref 1–1.03)
UROBILINOGEN UR QL STRIP: ABNORMAL
WBC NRBC COR # BLD AUTO: 4.61 10*3/MM3 (ref 3.4–10.8)

## 2024-02-20 PROCEDURE — 36415 COLL VENOUS BLD VENIPUNCTURE: CPT

## 2024-02-20 PROCEDURE — 85025 COMPLETE CBC W/AUTO DIFF WBC: CPT

## 2024-02-20 PROCEDURE — 80053 COMPREHEN METABOLIC PANEL: CPT

## 2024-02-20 PROCEDURE — 82378 CARCINOEMBRYONIC ANTIGEN: CPT

## 2024-02-20 PROCEDURE — 81003 URINALYSIS AUTO W/O SCOPE: CPT

## 2024-03-06 ENCOUNTER — TRANSCRIBE ORDERS (OUTPATIENT)
Dept: LAB | Facility: HOSPITAL | Age: 54
End: 2024-03-06
Payer: COMMERCIAL

## 2024-03-06 ENCOUNTER — LAB (OUTPATIENT)
Dept: LAB | Facility: HOSPITAL | Age: 54
End: 2024-03-06
Payer: COMMERCIAL

## 2024-03-06 DIAGNOSIS — C18.6 MALIGNANT NEOPLASM OF DESCENDING COLON: ICD-10-CM

## 2024-03-06 DIAGNOSIS — C18.6 MALIGNANT NEOPLASM OF DESCENDING COLON: Primary | ICD-10-CM

## 2024-03-06 LAB
ALBUMIN SERPL-MCNC: 3.7 G/DL (ref 3.5–5.2)
ALBUMIN/GLOB SERPL: 1.2 G/DL
ALP SERPL-CCNC: 131 U/L (ref 39–117)
ALT SERPL W P-5'-P-CCNC: 37 U/L (ref 1–41)
ANION GAP SERPL CALCULATED.3IONS-SCNC: 10.9 MMOL/L (ref 5–15)
AST SERPL-CCNC: 36 U/L (ref 1–40)
BASOPHILS # BLD AUTO: 0.04 10*3/MM3 (ref 0–0.2)
BASOPHILS NFR BLD AUTO: 0.7 % (ref 0–1.5)
BILIRUB SERPL-MCNC: 0.6 MG/DL (ref 0–1.2)
BILIRUB UR QL STRIP: NEGATIVE
BUN SERPL-MCNC: 21 MG/DL (ref 6–20)
BUN/CREAT SERPL: 20.4 (ref 7–25)
CALCIUM SPEC-SCNC: 9 MG/DL (ref 8.6–10.5)
CEA SERPL-MCNC: 4.22 NG/ML
CHLORIDE SERPL-SCNC: 105 MMOL/L (ref 98–107)
CLARITY UR: CLEAR
CO2 SERPL-SCNC: 22.1 MMOL/L (ref 22–29)
COLOR UR: YELLOW
CREAT SERPL-MCNC: 1.03 MG/DL (ref 0.76–1.27)
DEPRECATED RDW RBC AUTO: 55 FL (ref 37–54)
EGFRCR SERPLBLD CKD-EPI 2021: 86.3 ML/MIN/1.73
EOSINOPHIL # BLD AUTO: 0.1 10*3/MM3 (ref 0–0.4)
EOSINOPHIL NFR BLD AUTO: 1.8 % (ref 0.3–6.2)
ERYTHROCYTE [DISTWIDTH] IN BLOOD BY AUTOMATED COUNT: 16.2 % (ref 12.3–15.4)
GLOBULIN UR ELPH-MCNC: 3.1 GM/DL
GLUCOSE SERPL-MCNC: 158 MG/DL (ref 65–99)
GLUCOSE UR STRIP-MCNC: NEGATIVE MG/DL
HCT VFR BLD AUTO: 36.9 % (ref 37.5–51)
HGB BLD-MCNC: 12.6 G/DL (ref 13–17.7)
HGB UR QL STRIP.AUTO: NEGATIVE
IMM GRANULOCYTES # BLD AUTO: 0.03 10*3/MM3 (ref 0–0.05)
IMM GRANULOCYTES NFR BLD AUTO: 0.6 % (ref 0–0.5)
KETONES UR QL STRIP: NEGATIVE
LEUKOCYTE ESTERASE UR QL STRIP.AUTO: NEGATIVE
LYMPHOCYTES # BLD AUTO: 1.88 10*3/MM3 (ref 0.7–3.1)
LYMPHOCYTES NFR BLD AUTO: 34.7 % (ref 19.6–45.3)
MCH RBC QN AUTO: 31.8 PG (ref 26.6–33)
MCHC RBC AUTO-ENTMCNC: 34.1 G/DL (ref 31.5–35.7)
MCV RBC AUTO: 93.2 FL (ref 79–97)
MONOCYTES # BLD AUTO: 0.69 10*3/MM3 (ref 0.1–0.9)
MONOCYTES NFR BLD AUTO: 12.7 % (ref 5–12)
NEUTROPHILS NFR BLD AUTO: 2.68 10*3/MM3 (ref 1.7–7)
NEUTROPHILS NFR BLD AUTO: 49.5 % (ref 42.7–76)
NITRITE UR QL STRIP: NEGATIVE
NRBC BLD AUTO-RTO: 0 /100 WBC (ref 0–0.2)
PH UR STRIP.AUTO: 6.5 [PH] (ref 5–8)
PLATELET # BLD AUTO: 123 10*3/MM3 (ref 140–450)
PMV BLD AUTO: 10.3 FL (ref 6–12)
POTASSIUM SERPL-SCNC: 4.7 MMOL/L (ref 3.5–5.2)
PROT SERPL-MCNC: 6.8 G/DL (ref 6–8.5)
PROT UR QL STRIP: NEGATIVE
RBC # BLD AUTO: 3.96 10*6/MM3 (ref 4.14–5.8)
SODIUM SERPL-SCNC: 138 MMOL/L (ref 136–145)
SP GR UR STRIP: 1.01 (ref 1–1.03)
UROBILINOGEN UR QL STRIP: NORMAL
WBC NRBC COR # BLD AUTO: 5.42 10*3/MM3 (ref 3.4–10.8)

## 2024-03-06 PROCEDURE — 36415 COLL VENOUS BLD VENIPUNCTURE: CPT

## 2024-03-06 PROCEDURE — 81003 URINALYSIS AUTO W/O SCOPE: CPT

## 2024-03-06 PROCEDURE — 82378 CARCINOEMBRYONIC ANTIGEN: CPT

## 2024-03-06 PROCEDURE — 80053 COMPREHEN METABOLIC PANEL: CPT

## 2024-03-06 PROCEDURE — 85025 COMPLETE CBC W/AUTO DIFF WBC: CPT

## 2024-03-19 ENCOUNTER — LAB (OUTPATIENT)
Dept: LAB | Facility: HOSPITAL | Age: 54
End: 2024-03-19
Payer: COMMERCIAL

## 2024-03-19 ENCOUNTER — TRANSCRIBE ORDERS (OUTPATIENT)
Dept: LAB | Facility: HOSPITAL | Age: 54
End: 2024-03-19
Payer: COMMERCIAL

## 2024-03-19 DIAGNOSIS — C18.6 MALIGNANT NEOPLASM OF DESCENDING COLON: ICD-10-CM

## 2024-03-19 DIAGNOSIS — C18.6 MALIGNANT NEOPLASM OF DESCENDING COLON: Primary | ICD-10-CM

## 2024-03-19 LAB
ALBUMIN SERPL-MCNC: 3.8 G/DL (ref 3.5–5.2)
ALBUMIN/GLOB SERPL: 1.5 G/DL
ALP SERPL-CCNC: 144 U/L (ref 39–117)
ALT SERPL W P-5'-P-CCNC: 60 U/L (ref 1–41)
ANION GAP SERPL CALCULATED.3IONS-SCNC: 10.3 MMOL/L (ref 5–15)
AST SERPL-CCNC: 54 U/L (ref 1–40)
BASOPHILS # BLD AUTO: 0.03 10*3/MM3 (ref 0–0.2)
BASOPHILS NFR BLD AUTO: 0.5 % (ref 0–1.5)
BILIRUB SERPL-MCNC: 0.6 MG/DL (ref 0–1.2)
BILIRUB UR QL STRIP: NEGATIVE
BUN SERPL-MCNC: 19 MG/DL (ref 6–20)
BUN/CREAT SERPL: 16.1 (ref 7–25)
CALCIUM SPEC-SCNC: 8.7 MG/DL (ref 8.6–10.5)
CEA SERPL-MCNC: 4.5 NG/ML
CHLORIDE SERPL-SCNC: 100 MMOL/L (ref 98–107)
CLARITY UR: CLEAR
CO2 SERPL-SCNC: 24.7 MMOL/L (ref 22–29)
COLOR UR: YELLOW
CREAT SERPL-MCNC: 1.18 MG/DL (ref 0.76–1.27)
DEPRECATED RDW RBC AUTO: 56.9 FL (ref 37–54)
EGFRCR SERPLBLD CKD-EPI 2021: 73.3 ML/MIN/1.73
EOSINOPHIL # BLD AUTO: 0.11 10*3/MM3 (ref 0–0.4)
EOSINOPHIL NFR BLD AUTO: 2 % (ref 0.3–6.2)
ERYTHROCYTE [DISTWIDTH] IN BLOOD BY AUTOMATED COUNT: 17 % (ref 12.3–15.4)
GLOBULIN UR ELPH-MCNC: 2.5 GM/DL
GLUCOSE SERPL-MCNC: 128 MG/DL (ref 65–99)
GLUCOSE UR STRIP-MCNC: NEGATIVE MG/DL
HCT VFR BLD AUTO: 35 % (ref 37.5–51)
HGB BLD-MCNC: 12.2 G/DL (ref 13–17.7)
HGB UR QL STRIP.AUTO: NEGATIVE
IMM GRANULOCYTES # BLD AUTO: 0.01 10*3/MM3 (ref 0–0.05)
IMM GRANULOCYTES NFR BLD AUTO: 0.2 % (ref 0–0.5)
KETONES UR QL STRIP: NEGATIVE
LEUKOCYTE ESTERASE UR QL STRIP.AUTO: NEGATIVE
LYMPHOCYTES # BLD AUTO: 1.69 10*3/MM3 (ref 0.7–3.1)
LYMPHOCYTES NFR BLD AUTO: 30.9 % (ref 19.6–45.3)
MCH RBC QN AUTO: 32.4 PG (ref 26.6–33)
MCHC RBC AUTO-ENTMCNC: 34.9 G/DL (ref 31.5–35.7)
MCV RBC AUTO: 92.8 FL (ref 79–97)
MONOCYTES # BLD AUTO: 0.76 10*3/MM3 (ref 0.1–0.9)
MONOCYTES NFR BLD AUTO: 13.9 % (ref 5–12)
NEUTROPHILS NFR BLD AUTO: 2.87 10*3/MM3 (ref 1.7–7)
NEUTROPHILS NFR BLD AUTO: 52.5 % (ref 42.7–76)
NITRITE UR QL STRIP: NEGATIVE
NRBC BLD AUTO-RTO: 0 /100 WBC (ref 0–0.2)
PH UR STRIP.AUTO: 6.5 [PH] (ref 5–8)
PLATELET # BLD AUTO: 91 10*3/MM3 (ref 140–450)
PMV BLD AUTO: 10.9 FL (ref 6–12)
POTASSIUM SERPL-SCNC: 4.5 MMOL/L (ref 3.5–5.2)
PROT SERPL-MCNC: 6.3 G/DL (ref 6–8.5)
PROT UR QL STRIP: NEGATIVE
RBC # BLD AUTO: 3.77 10*6/MM3 (ref 4.14–5.8)
SODIUM SERPL-SCNC: 135 MMOL/L (ref 136–145)
SP GR UR STRIP: 1.02 (ref 1–1.03)
UROBILINOGEN UR QL STRIP: NORMAL
WBC NRBC COR # BLD AUTO: 5.47 10*3/MM3 (ref 3.4–10.8)

## 2024-03-19 PROCEDURE — 85025 COMPLETE CBC W/AUTO DIFF WBC: CPT

## 2024-03-19 PROCEDURE — 81003 URINALYSIS AUTO W/O SCOPE: CPT

## 2024-03-19 PROCEDURE — 82378 CARCINOEMBRYONIC ANTIGEN: CPT

## 2024-03-19 PROCEDURE — 36415 COLL VENOUS BLD VENIPUNCTURE: CPT

## 2024-03-19 PROCEDURE — 80053 COMPREHEN METABOLIC PANEL: CPT

## 2024-04-02 ENCOUNTER — LAB (OUTPATIENT)
Dept: LAB | Facility: HOSPITAL | Age: 54
End: 2024-04-02
Payer: COMMERCIAL

## 2024-04-02 ENCOUNTER — TRANSCRIBE ORDERS (OUTPATIENT)
Dept: LAB | Facility: HOSPITAL | Age: 54
End: 2024-04-02
Payer: COMMERCIAL

## 2024-04-02 DIAGNOSIS — C18.6 MALIGNANT NEOPLASM OF DESCENDING COLON: ICD-10-CM

## 2024-04-02 DIAGNOSIS — C18.6 MALIGNANT NEOPLASM OF DESCENDING COLON: Primary | ICD-10-CM

## 2024-04-02 LAB
ALBUMIN SERPL-MCNC: 3.6 G/DL (ref 3.5–5.2)
ALBUMIN/GLOB SERPL: 1.3 G/DL
ALP SERPL-CCNC: 139 U/L (ref 39–117)
ALT SERPL W P-5'-P-CCNC: 43 U/L (ref 1–41)
ANION GAP SERPL CALCULATED.3IONS-SCNC: 8.5 MMOL/L (ref 5–15)
AST SERPL-CCNC: 40 U/L (ref 1–40)
BASOPHILS # BLD AUTO: 0.03 10*3/MM3 (ref 0–0.2)
BASOPHILS NFR BLD AUTO: 0.6 % (ref 0–1.5)
BILIRUB SERPL-MCNC: 0.4 MG/DL (ref 0–1.2)
BILIRUB UR QL STRIP: NEGATIVE
BUN SERPL-MCNC: 20 MG/DL (ref 6–20)
BUN/CREAT SERPL: 18.2 (ref 7–25)
CALCIUM SPEC-SCNC: 9 MG/DL (ref 8.6–10.5)
CEA SERPL-MCNC: 4.57 NG/ML
CHLORIDE SERPL-SCNC: 105 MMOL/L (ref 98–107)
CLARITY UR: CLEAR
CO2 SERPL-SCNC: 24.5 MMOL/L (ref 22–29)
COLOR UR: YELLOW
CREAT SERPL-MCNC: 1.1 MG/DL (ref 0.76–1.27)
DEPRECATED RDW RBC AUTO: 61.5 FL (ref 37–54)
EGFRCR SERPLBLD CKD-EPI 2021: 79.8 ML/MIN/1.73
EOSINOPHIL # BLD AUTO: 0.13 10*3/MM3 (ref 0–0.4)
EOSINOPHIL NFR BLD AUTO: 2.4 % (ref 0.3–6.2)
ERYTHROCYTE [DISTWIDTH] IN BLOOD BY AUTOMATED COUNT: 18 % (ref 12.3–15.4)
GLOBULIN UR ELPH-MCNC: 2.7 GM/DL
GLUCOSE SERPL-MCNC: 146 MG/DL (ref 65–99)
GLUCOSE UR STRIP-MCNC: NEGATIVE MG/DL
HCT VFR BLD AUTO: 33.6 % (ref 37.5–51)
HGB BLD-MCNC: 11.2 G/DL (ref 13–17.7)
HGB UR QL STRIP.AUTO: NEGATIVE
IMM GRANULOCYTES # BLD AUTO: 0.02 10*3/MM3 (ref 0–0.05)
IMM GRANULOCYTES NFR BLD AUTO: 0.4 % (ref 0–0.5)
KETONES UR QL STRIP: NEGATIVE
LEUKOCYTE ESTERASE UR QL STRIP.AUTO: NEGATIVE
LYMPHOCYTES # BLD AUTO: 1.65 10*3/MM3 (ref 0.7–3.1)
LYMPHOCYTES NFR BLD AUTO: 30.9 % (ref 19.6–45.3)
MCH RBC QN AUTO: 31.5 PG (ref 26.6–33)
MCHC RBC AUTO-ENTMCNC: 33.3 G/DL (ref 31.5–35.7)
MCV RBC AUTO: 94.6 FL (ref 79–97)
MONOCYTES # BLD AUTO: 0.49 10*3/MM3 (ref 0.1–0.9)
MONOCYTES NFR BLD AUTO: 9.2 % (ref 5–12)
NEUTROPHILS NFR BLD AUTO: 3.02 10*3/MM3 (ref 1.7–7)
NEUTROPHILS NFR BLD AUTO: 56.5 % (ref 42.7–76)
NITRITE UR QL STRIP: NEGATIVE
NRBC BLD AUTO-RTO: 0 /100 WBC (ref 0–0.2)
PH UR STRIP.AUTO: 6 [PH] (ref 5–8)
PLATELET # BLD AUTO: 108 10*3/MM3 (ref 140–450)
PMV BLD AUTO: 10.5 FL (ref 6–12)
POTASSIUM SERPL-SCNC: 4.4 MMOL/L (ref 3.5–5.2)
PROT SERPL-MCNC: 6.3 G/DL (ref 6–8.5)
PROT UR QL STRIP: NEGATIVE
RBC # BLD AUTO: 3.55 10*6/MM3 (ref 4.14–5.8)
SODIUM SERPL-SCNC: 138 MMOL/L (ref 136–145)
SP GR UR STRIP: 1.02 (ref 1–1.03)
UROBILINOGEN UR QL STRIP: NORMAL
WBC NRBC COR # BLD AUTO: 5.34 10*3/MM3 (ref 3.4–10.8)

## 2024-04-02 PROCEDURE — 85025 COMPLETE CBC W/AUTO DIFF WBC: CPT

## 2024-04-02 PROCEDURE — 80053 COMPREHEN METABOLIC PANEL: CPT

## 2024-04-02 PROCEDURE — 81003 URINALYSIS AUTO W/O SCOPE: CPT

## 2024-04-02 PROCEDURE — 82378 CARCINOEMBRYONIC ANTIGEN: CPT

## 2024-04-02 PROCEDURE — 36415 COLL VENOUS BLD VENIPUNCTURE: CPT

## 2024-04-16 ENCOUNTER — LAB (OUTPATIENT)
Dept: LAB | Facility: HOSPITAL | Age: 54
End: 2024-04-16
Payer: COMMERCIAL

## 2024-04-16 ENCOUNTER — TRANSCRIBE ORDERS (OUTPATIENT)
Dept: LAB | Facility: HOSPITAL | Age: 54
End: 2024-04-16
Payer: COMMERCIAL

## 2024-04-16 DIAGNOSIS — C18.6 MALIGNANT NEOPLASM OF DESCENDING COLON: ICD-10-CM

## 2024-04-16 DIAGNOSIS — C18.6 MALIGNANT NEOPLASM OF DESCENDING COLON: Primary | ICD-10-CM

## 2024-04-16 LAB
ALBUMIN SERPL-MCNC: 3.6 G/DL (ref 3.5–5.2)
ALBUMIN/GLOB SERPL: 1.3 G/DL
ALP SERPL-CCNC: 142 U/L (ref 39–117)
ALT SERPL W P-5'-P-CCNC: 42 U/L (ref 1–41)
ANION GAP SERPL CALCULATED.3IONS-SCNC: 8.2 MMOL/L (ref 5–15)
AST SERPL-CCNC: 39 U/L (ref 1–40)
BASOPHILS # BLD AUTO: 0.01 10*3/MM3 (ref 0–0.2)
BASOPHILS NFR BLD AUTO: 0.2 % (ref 0–1.5)
BILIRUB SERPL-MCNC: 0.4 MG/DL (ref 0–1.2)
BILIRUB UR QL STRIP: NEGATIVE
BUN SERPL-MCNC: 16 MG/DL (ref 6–20)
BUN/CREAT SERPL: 14.4 (ref 7–25)
CALCIUM SPEC-SCNC: 8.9 MG/DL (ref 8.6–10.5)
CEA SERPL-MCNC: 4.61 NG/ML
CHLORIDE SERPL-SCNC: 104 MMOL/L (ref 98–107)
CLARITY UR: CLEAR
CO2 SERPL-SCNC: 23.8 MMOL/L (ref 22–29)
COLOR UR: YELLOW
CREAT SERPL-MCNC: 1.11 MG/DL (ref 0.76–1.27)
DEPRECATED RDW RBC AUTO: 62.1 FL (ref 37–54)
EGFRCR SERPLBLD CKD-EPI 2021: 78.9 ML/MIN/1.73
EOSINOPHIL # BLD AUTO: 0.1 10*3/MM3 (ref 0–0.4)
EOSINOPHIL NFR BLD AUTO: 2.2 % (ref 0.3–6.2)
ERYTHROCYTE [DISTWIDTH] IN BLOOD BY AUTOMATED COUNT: 17.8 % (ref 12.3–15.4)
GLOBULIN UR ELPH-MCNC: 2.8 GM/DL
GLUCOSE SERPL-MCNC: 138 MG/DL (ref 65–99)
GLUCOSE UR STRIP-MCNC: NEGATIVE MG/DL
HCT VFR BLD AUTO: 33.6 % (ref 37.5–51)
HGB BLD-MCNC: 11.1 G/DL (ref 13–17.7)
HGB UR QL STRIP.AUTO: NEGATIVE
IMM GRANULOCYTES # BLD AUTO: 0.01 10*3/MM3 (ref 0–0.05)
IMM GRANULOCYTES NFR BLD AUTO: 0.2 % (ref 0–0.5)
KETONES UR QL STRIP: NEGATIVE
LEUKOCYTE ESTERASE UR QL STRIP.AUTO: NEGATIVE
LYMPHOCYTES # BLD AUTO: 1.57 10*3/MM3 (ref 0.7–3.1)
LYMPHOCYTES NFR BLD AUTO: 33.8 % (ref 19.6–45.3)
MCH RBC QN AUTO: 31.2 PG (ref 26.6–33)
MCHC RBC AUTO-ENTMCNC: 33 G/DL (ref 31.5–35.7)
MCV RBC AUTO: 94.4 FL (ref 79–97)
MONOCYTES # BLD AUTO: 0.61 10*3/MM3 (ref 0.1–0.9)
MONOCYTES NFR BLD AUTO: 13.1 % (ref 5–12)
NEUTROPHILS NFR BLD AUTO: 2.34 10*3/MM3 (ref 1.7–7)
NEUTROPHILS NFR BLD AUTO: 50.5 % (ref 42.7–76)
NITRITE UR QL STRIP: NEGATIVE
NRBC BLD AUTO-RTO: 0 /100 WBC (ref 0–0.2)
PH UR STRIP.AUTO: 6.5 [PH] (ref 5–8)
PLATELET # BLD AUTO: 102 10*3/MM3 (ref 140–450)
PMV BLD AUTO: 10.7 FL (ref 6–12)
POTASSIUM SERPL-SCNC: 4.3 MMOL/L (ref 3.5–5.2)
PROT SERPL-MCNC: 6.4 G/DL (ref 6–8.5)
PROT UR QL STRIP: NEGATIVE
RBC # BLD AUTO: 3.56 10*6/MM3 (ref 4.14–5.8)
SODIUM SERPL-SCNC: 136 MMOL/L (ref 136–145)
SP GR UR STRIP: 1.02 (ref 1–1.03)
UROBILINOGEN UR QL STRIP: NORMAL
WBC NRBC COR # BLD AUTO: 4.64 10*3/MM3 (ref 3.4–10.8)

## 2024-04-16 PROCEDURE — 81003 URINALYSIS AUTO W/O SCOPE: CPT

## 2024-04-16 PROCEDURE — 36415 COLL VENOUS BLD VENIPUNCTURE: CPT

## 2024-04-16 PROCEDURE — 85025 COMPLETE CBC W/AUTO DIFF WBC: CPT

## 2024-04-16 PROCEDURE — 80053 COMPREHEN METABOLIC PANEL: CPT

## 2024-04-16 PROCEDURE — 82378 CARCINOEMBRYONIC ANTIGEN: CPT

## 2024-04-28 ENCOUNTER — HOSPITAL ENCOUNTER (EMERGENCY)
Facility: HOSPITAL | Age: 54
Discharge: HOME | End: 2024-04-28
Attending: STUDENT IN AN ORGANIZED HEALTH CARE EDUCATION/TRAINING PROGRAM | Admitting: STUDENT IN AN ORGANIZED HEALTH CARE EDUCATION/TRAINING PROGRAM
Payer: COMMERCIAL

## 2024-04-28 ENCOUNTER — APPOINTMENT (OUTPATIENT)
Dept: CT IMAGING | Facility: HOSPITAL | Age: 54
End: 2024-04-28
Payer: COMMERCIAL

## 2024-04-28 VITALS
SYSTOLIC BLOOD PRESSURE: 114 MMHG | DIASTOLIC BLOOD PRESSURE: 64 MMHG | HEIGHT: 69 IN | WEIGHT: 280 LBS | OXYGEN SATURATION: 98 % | TEMPERATURE: 97.9 F | HEART RATE: 97 BPM | BODY MASS INDEX: 41.47 KG/M2 | RESPIRATION RATE: 18 BRPM

## 2024-04-28 DIAGNOSIS — A02.0 SALMONELLA GASTROENTERITIS: Primary | ICD-10-CM

## 2024-04-28 LAB
ADV 40+41 DNA STL QL NAA+NON-PROBE: NOT DETECTED
ALBUMIN SERPL-MCNC: 3.6 G/DL (ref 3.5–5.2)
ALBUMIN/GLOB SERPL: 1.2 G/DL
ALP SERPL-CCNC: 98 U/L (ref 39–117)
ALT SERPL W P-5'-P-CCNC: 45 U/L (ref 1–41)
ANION GAP SERPL CALCULATED.3IONS-SCNC: 12.7 MMOL/L (ref 5–15)
AST SERPL-CCNC: 52 U/L (ref 1–40)
ASTRO TYP 1-8 RNA STL QL NAA+NON-PROBE: NOT DETECTED
BASOPHILS # BLD AUTO: 0.02 10*3/MM3 (ref 0–0.2)
BASOPHILS NFR BLD AUTO: 0.4 % (ref 0–1.5)
BILIRUB SERPL-MCNC: 0.8 MG/DL (ref 0–1.2)
BUN SERPL-MCNC: 15 MG/DL (ref 6–20)
BUN/CREAT SERPL: 11.1 (ref 7–25)
C CAYETANENSIS DNA STL QL NAA+NON-PROBE: NOT DETECTED
C COLI+JEJ+UPSA DNA STL QL NAA+NON-PROBE: NOT DETECTED
C DIFF GDH + TOXINS A+B STL QL IA.RAPID: NEGATIVE
C DIFF GDH + TOXINS A+B STL QL IA.RAPID: NEGATIVE
CALCIUM SPEC-SCNC: 8.7 MG/DL (ref 8.6–10.5)
CHLORIDE SERPL-SCNC: 103 MMOL/L (ref 98–107)
CO2 SERPL-SCNC: 19.3 MMOL/L (ref 22–29)
CREAT SERPL-MCNC: 1.35 MG/DL (ref 0.76–1.27)
CRYPTOSP DNA STL QL NAA+NON-PROBE: NOT DETECTED
DEPRECATED RDW RBC AUTO: 61.1 FL (ref 37–54)
E HISTOLYT DNA STL QL NAA+NON-PROBE: NOT DETECTED
EAEC PAA PLAS AGGR+AATA ST NAA+NON-PRB: NOT DETECTED
EC STX1+STX2 GENES STL QL NAA+NON-PROBE: NOT DETECTED
EGFRCR SERPLBLD CKD-EPI 2021: 62.4 ML/MIN/1.73
EOSINOPHIL # BLD AUTO: 0.07 10*3/MM3 (ref 0–0.4)
EOSINOPHIL # BLD MANUAL: 0.1 10*3/MM3 (ref 0–0.4)
EOSINOPHIL NFR BLD AUTO: 1.4 % (ref 0.3–6.2)
EOSINOPHIL NFR BLD MANUAL: 2 % (ref 0.3–6.2)
EPEC EAE GENE STL QL NAA+NON-PROBE: NOT DETECTED
ERYTHROCYTE [DISTWIDTH] IN BLOOD BY AUTOMATED COUNT: 18.2 % (ref 12.3–15.4)
ETEC LTA+ST1A+ST1B TOX ST NAA+NON-PROBE: NOT DETECTED
G LAMBLIA DNA STL QL NAA+NON-PROBE: NOT DETECTED
GLOBULIN UR ELPH-MCNC: 2.9 GM/DL
GLUCOSE SERPL-MCNC: 134 MG/DL (ref 65–99)
HCT VFR BLD AUTO: 33.8 % (ref 37.5–51)
HGB BLD-MCNC: 11.6 G/DL (ref 13–17.7)
IMM GRANULOCYTES # BLD AUTO: 0.02 10*3/MM3 (ref 0–0.05)
IMM GRANULOCYTES NFR BLD AUTO: 0.4 % (ref 0–0.5)
LYMPHOCYTES # BLD AUTO: 0.96 10*3/MM3 (ref 0.7–3.1)
LYMPHOCYTES # BLD MANUAL: 1.3 10*3/MM3 (ref 0.7–3.1)
LYMPHOCYTES NFR BLD AUTO: 19.2 % (ref 19.6–45.3)
LYMPHOCYTES NFR BLD MANUAL: 19 % (ref 5–12)
MAGNESIUM SERPL-MCNC: 1.8 MG/DL (ref 1.6–2.6)
MCH RBC QN AUTO: 31.5 PG (ref 26.6–33)
MCHC RBC AUTO-ENTMCNC: 34.3 G/DL (ref 31.5–35.7)
MCV RBC AUTO: 91.8 FL (ref 79–97)
MONOCYTES # BLD AUTO: 1.08 10*3/MM3 (ref 0.1–0.9)
MONOCYTES # BLD: 0.95 10*3/MM3 (ref 0.1–0.9)
MONOCYTES NFR BLD AUTO: 21.6 % (ref 5–12)
NEUTROPHILS # BLD AUTO: 2.65 10*3/MM3 (ref 1.7–7)
NEUTROPHILS NFR BLD AUTO: 2.85 10*3/MM3 (ref 1.7–7)
NEUTROPHILS NFR BLD AUTO: 57 % (ref 42.7–76)
NEUTROPHILS NFR BLD MANUAL: 41 % (ref 42.7–76)
NEUTS BAND NFR BLD MANUAL: 12 % (ref 0–5)
NOROVIRUS GI+II RNA STL QL NAA+NON-PROBE: NOT DETECTED
NRBC BLD AUTO-RTO: 0 /100 WBC (ref 0–0.2)
P SHIGELLOIDES DNA STL QL NAA+NON-PROBE: NOT DETECTED
PLAT MORPH BLD: NORMAL
PLATELET # BLD AUTO: 97 10*3/MM3 (ref 140–450)
PMV BLD AUTO: 10.5 FL (ref 6–12)
POTASSIUM SERPL-SCNC: 4.1 MMOL/L (ref 3.5–5.2)
PROT SERPL-MCNC: 6.5 G/DL (ref 6–8.5)
RBC # BLD AUTO: 3.68 10*6/MM3 (ref 4.14–5.8)
RBC MORPH BLD: NORMAL
RVA RNA STL QL NAA+NON-PROBE: NOT DETECTED
S ENT+BONG DNA STL QL NAA+NON-PROBE: DETECTED
SAPO I+II+IV+V RNA STL QL NAA+NON-PROBE: NOT DETECTED
SHIGELLA SP+EIEC IPAH ST NAA+NON-PROBE: NOT DETECTED
SODIUM SERPL-SCNC: 135 MMOL/L (ref 136–145)
V CHOL+PARA+VUL DNA STL QL NAA+NON-PROBE: NOT DETECTED
V CHOLERAE DNA STL QL NAA+NON-PROBE: NOT DETECTED
VARIANT LYMPHS NFR BLD MANUAL: 18 % (ref 19.6–45.3)
VARIANT LYMPHS NFR BLD MANUAL: 8 % (ref 0–5)
WBC MORPH BLD: NORMAL
WBC NRBC COR # BLD AUTO: 5 10*3/MM3 (ref 3.4–10.8)
Y ENTEROCOL DNA STL QL NAA+NON-PROBE: NOT DETECTED

## 2024-04-28 PROCEDURE — 87324 CLOSTRIDIUM AG IA: CPT | Performed by: PHYSICIAN ASSISTANT

## 2024-04-28 PROCEDURE — 36415 COLL VENOUS BLD VENIPUNCTURE: CPT

## 2024-04-28 PROCEDURE — 80053 COMPREHEN METABOLIC PANEL: CPT | Performed by: PHYSICIAN ASSISTANT

## 2024-04-28 PROCEDURE — 83735 ASSAY OF MAGNESIUM: CPT | Performed by: PHYSICIAN ASSISTANT

## 2024-04-28 PROCEDURE — 87507 IADNA-DNA/RNA PROBE TQ 12-25: CPT | Performed by: PHYSICIAN ASSISTANT

## 2024-04-28 PROCEDURE — 85025 COMPLETE CBC W/AUTO DIFF WBC: CPT | Performed by: PHYSICIAN ASSISTANT

## 2024-04-28 PROCEDURE — 25810000003 SODIUM CHLORIDE 0.9 % SOLUTION: Performed by: PHYSICIAN ASSISTANT

## 2024-04-28 PROCEDURE — 25510000001 IOPAMIDOL 61 % SOLUTION: Performed by: STUDENT IN AN ORGANIZED HEALTH CARE EDUCATION/TRAINING PROGRAM

## 2024-04-28 PROCEDURE — 87449 NOS EACH ORGANISM AG IA: CPT | Performed by: PHYSICIAN ASSISTANT

## 2024-04-28 PROCEDURE — 99285 EMERGENCY DEPT VISIT HI MDM: CPT

## 2024-04-28 PROCEDURE — 87040 BLOOD CULTURE FOR BACTERIA: CPT | Performed by: PHYSICIAN ASSISTANT

## 2024-04-28 PROCEDURE — 74177 CT ABD & PELVIS W/CONTRAST: CPT

## 2024-04-28 PROCEDURE — 85007 BL SMEAR W/DIFF WBC COUNT: CPT | Performed by: PHYSICIAN ASSISTANT

## 2024-04-28 RX ORDER — CIPROFLOXACIN 500 MG/1
500 TABLET, FILM COATED ORAL 2 TIMES DAILY
Qty: 14 TABLET | Refills: 0 | Status: SHIPPED | OUTPATIENT
Start: 2024-04-28 | End: 2024-05-05

## 2024-04-28 RX ORDER — SODIUM CHLORIDE 0.9 % (FLUSH) 0.9 %
10 SYRINGE (ML) INJECTION AS NEEDED
Status: DISCONTINUED | OUTPATIENT
Start: 2024-04-28 | End: 2024-04-28 | Stop reason: HOSPADM

## 2024-04-28 RX ADMIN — SODIUM CHLORIDE 1000 ML: 9 INJECTION, SOLUTION INTRAVENOUS at 13:16

## 2024-04-28 RX ADMIN — IOPAMIDOL 100 ML: 612 INJECTION, SOLUTION INTRAVENOUS at 14:15

## 2024-04-28 NOTE — ED PROVIDER NOTES
Subjective  History of Present Illness:    Chief Complaint:   Chief Complaint   Patient presents with    Diarrhea      History of Present Illness: Ladarius Jones is a 53 y.o. male who presents to the emergency department complaining of diarrhea.  Patient states for 4 days has had 2 NephroScan episodes of diarrhea daily that is watery and nonbloody.  No abdominal pain.  No vomiting.  Does have a history of colon cancer currently getting chemotherapy every other week has been for a year.  Followed by [facility].  No recent antibiotics suspicious foods or travel.  No sick contacts.  Onset: 4 days ago  Duration: Ongoing  Exacerbating / Alleviating factors: Worse whenever he tries to eat anything  Associated symptoms: None      Nurses Notes reviewed and agree, including vitals, allergies, social history and prior medical history.     Review of Systems   Constitutional: Negative.    HENT: Negative.     Eyes: Negative.    Respiratory: Negative.     Cardiovascular: Negative.    Gastrointestinal:  Positive for diarrhea. Negative for abdominal pain and blood in stool.   Genitourinary: Negative.    Musculoskeletal: Negative.    Skin: Negative.    Allergic/Immunologic: Negative.    Neurological: Negative.    Psychiatric/Behavioral: Negative.     All other systems reviewed and are negative.      Past Medical History:   Diagnosis Date    Allergic rhinitis     Anemia     Benign prostate hyperplasia     Depression     Diabetes mellitus     Hypertension     Hyperuricemia     Osteoarthritis     Sleep apnea        Allergies:    Patient has no known allergies.      Past Surgical History:   Procedure Laterality Date    COLONOSCOPY N/A 5/8/2023    Procedure: COLONOSCOPY WITH POLYPECTOMY AND BIOPSY AND TATTOO;  Surgeon: Eileen Boggs MD;  Location: [facility] ENDOSCOPY;  Service: Gastroenterology;  Laterality: N/A;    ENDOSCOPY N/A 5/8/2023    Procedure: ESOPHAGOGASTRODUODENOSCOPY WITH BIOPSY;  Surgeon: Eileen Boggs  "MD;  Location: [facility] ENDOSCOPY;  Service: Gastroenterology;  Laterality: N/A;    KNEE SURGERY Left     ROTATOR CUFF REPAIR Left     WRIST SURGERY Right          Social History     Socioeconomic History    Marital status:    Tobacco Use    Smoking status: Never     Passive exposure: Never    Smokeless tobacco: Never   Vaping Use    Vaping status: Never Used   Substance and Sexual Activity    Alcohol use: Yes     Comment: occasional    Drug use: No    Sexual activity: Defer         Family History   Problem Relation Age of Onset    Asthma Mother     Diabetes Mother     Asthma Father     Diabetes Father     Heart disease Father     Hypertension Father     Stroke Father     Colon cancer Neg Hx        Objective  Physical Exam:  /57   Pulse 97   Temp 97.9 °F (36.6 °C) (Oral)   Resp 18   Ht 175.3 cm (69\")   Wt 127 kg (280 lb)   SpO2 97%   BMI 41.35 kg/m²      Physical Exam  Vitals and nursing note reviewed.   Constitutional:       General: He is not in acute distress.     Appearance: Normal appearance. He is obese. He is not ill-appearing, toxic-appearing or diaphoretic.   HENT:      Head: Normocephalic and atraumatic.      Nose: Nose normal.   Eyes:      Extraocular Movements: Extraocular movements intact.   Cardiovascular:      Rate and Rhythm: Normal rate and regular rhythm.   Pulmonary:      Effort: Pulmonary effort is normal.   Abdominal:      General: Abdomen is flat. Bowel sounds are normal. There is no distension.      Palpations: Abdomen is soft.      Tenderness: There is no abdominal tenderness. There is no guarding or rebound.   Musculoskeletal:         General: Normal range of motion.      Cervical back: Normal range of motion.   Skin:     General: Skin is warm and dry.   Neurological:      General: No focal deficit present.      Mental Status: He is alert.   Psychiatric:         Mood and Affect: Mood normal.         Behavior: Behavior normal.           Procedures    ED Course:    ED Course " as of 04/28/24 1542   Sun Apr 28, 2024   1336 WBC: 5.00 [TM]   1336 Hemoglobin(!): 11.6 [TM]   1336 Hematocrit(!): 33.8 [TM]   1350 Creatinine(!): 1.35 [TM]   1350 Sodium(!): 135 [TM]   1433 C Diff GDH Ag: Negative [TM]   1433 C.diff Toxin Ag: Negative [TM]   1526 Salmonella(!): Detected [TM]      ED Course User Index  [TM] Tomas Castellano PA-C       Lab Results (last 24 hours)       Procedure Component Value Units Date/Time    Gastrointestinal Panel, PCR - Stool, Per Rectum [[id number]]  (Abnormal) Collected: 04/28/24 1246    Specimen: Stool from Per Rectum Updated: 04/28/24 1525     Campylobacter Not Detected     Plesiomonas shigelloides Not Detected     Salmonella Detected     Vibrio Not Detected     Vibrio cholerae Not Detected     Yersinia enterocolitica Not Detected     Enteroaggregative E. coli (EAEC) Not Detected     Enteropathogenic E. coli (EPEC) Not Detected     Enterotoxigenic E. coli (ETEC) lt/st Not Detected     Shiga-like toxin-producing E. coli (STEC) stx1/stx2 Not Detected     Shigella/Enteroinvasive E. coli (EIEC) Not Detected     Cryptosporidium Not Detected     Cyclospora cayetanensis Not Detected     Entamoeba histolytica Not Detected     Giardia lamblia Not Detected     Adenovirus F40/41 Not Detected     Astrovirus Not Detected     Norovirus GI/GII Not Detected     Rotavirus A Not Detected     Sapovirus (I, II, IV or V) Not Detected    Clostridioides difficile Toxin - Stool, Per Rectum [[id number]]  (Normal) Collected: 04/28/24 1246    Specimen: Stool from Per Rectum Updated: 04/28/24 1413    Narrative:      The following orders were created for panel order Clostridioides difficile Toxin - Stool, Per Rectum.  Procedure                               Abnormality         Status                     ---------                               -----------         ------                     Clostridioides difficile...[[id number]]  Normal              Final result                 Please view  results for these tests on the individual orders.    Clostridioides difficile EIA - Stool, Per Rectum [[id number]]  (Normal) Collected: 04/28/24 1246    Specimen: Stool from Per Rectum Updated: 04/28/24 1413     C Diff GDH Ag Negative     C.diff Toxin Ag Negative    Narrative:      The result indicates the absence of toxigenic C.difficile from stool specimen.    CBC & Differential [[id number]]  (Abnormal) Collected: 04/28/24 1314    Specimen: Blood from Arm, Left Updated: 04/28/24 1430    Narrative:      The following orders were created for panel order CBC & Differential.  Procedure                               Abnormality         Status                     ---------                               -----------         ------                     CBC Auto Differential[[id number]]        Abnormal            Final result               Scan Slide[[id number]]                                                                    Please view results for these tests on the individual orders.    Comprehensive Metabolic Panel [[id number]]  (Abnormal) Collected: 04/28/24 1314    Specimen: Blood from Arm, Left Updated: 04/28/24 1346     Glucose 134 mg/dL      BUN 15 mg/dL      Creatinine 1.35 mg/dL      Sodium 135 mmol/L      Potassium 4.1 mmol/L      Chloride 103 mmol/L      CO2 19.3 mmol/L      Calcium 8.7 mg/dL      Total Protein 6.5 g/dL      Albumin 3.6 g/dL      ALT (SGPT) 45 U/L      AST (SGOT) 52 U/L      Alkaline Phosphatase 98 U/L      Total Bilirubin 0.8 mg/dL      Globulin 2.9 gm/dL      A/G Ratio 1.2 g/dL      BUN/Creatinine Ratio 11.1     Anion Gap 12.7 mmol/L      eGFR 62.4 mL/min/1.73     Narrative:      GFR Normal >60  Chronic Kidney Disease <60  Kidney Failure <15      Magnesium [[id number]]  (Normal) Collected: 04/28/24 1314    Specimen: Blood from Arm, Left Updated: 04/28/24 1346     Magnesium 1.8 mg/dL     CBC Auto Differential [[id number]]  (Abnormal) Collected: 04/28/24 1314    Specimen: Blood from Arm, Left  Updated: 04/28/24 1330     WBC 5.00 10*3/mm3      RBC 3.68 10*6/mm3      Hemoglobin 11.6 g/dL      Hematocrit 33.8 %      MCV 91.8 fL      MCH 31.5 pg      MCHC 34.3 g/dL      RDW 18.2 %      RDW-SD 61.1 fl      MPV 10.5 fL      Platelets 97 10*3/mm3      Neutrophil % 57.0 %      Lymphocyte % 19.2 %      Monocyte % 21.6 %      Eosinophil % 1.4 %      Basophil % 0.4 %      Immature Grans % 0.4 %      Neutrophils, Absolute 2.85 10*3/mm3      Lymphocytes, Absolute 0.96 10*3/mm3      Monocytes, Absolute 1.08 10*3/mm3      Eosinophils, Absolute 0.07 10*3/mm3      Basophils, Absolute 0.02 10*3/mm3      Immature Grans, Absolute 0.02 10*3/mm3      nRBC 0.0 /100 WBC     Manual Differential [[id number]]  (Abnormal) Collected: 04/28/24 1314    Specimen: Blood from Arm, Left Updated: 04/28/24 1438     Neutrophil % 41.0 %      Lymphocyte % 18.0 %      Monocyte % 19.0 %      Eosinophil % 2.0 %      Bands %  12.0 %      Atypical Lymphocyte % 8.0 %      Neutrophils Absolute 2.65 10*3/mm3      Lymphocytes Absolute 1.30 10*3/mm3      Monocytes Absolute 0.95 10*3/mm3      Eosinophils Absolute 0.10 10*3/mm3      RBC Morphology Normal     WBC Morphology Normal     Platelet Morphology Normal             CT Abdomen Pelvis With Contrast    Result Date: 4/28/2024  CT ABDOMEN AND PELVIS WITH CONTRAST  INDICATION: Diarrhea.  TECHNIQUE: Thin section axial images were obtained from the lung bases through the pubic symphysis after oral and intravenous contrast. Coronal reconstruction images were obtained from the axial data.  COMPARISON: 8/3/2023  FINDINGS:  Lower chest:  Lung bases are clear.  Liver: There are multiple hypodense lesions in the liver. These have decreased in size compared to prior exam consistent with improving metastases. For example, a lesion in the lateral right lobe measures 24 mm and was 46 mm. There is a nodular contour to the liver.  Gallbladder/biliary system:  Gallbladder is present.   No intrahepatic or extrahepatic  biliary dilatation.  Spleen: The spleen is enlarged measuring 17 cm in axial dimension. No focal splenic lesion.  Adrenal glands: There is a fat-containing left adrenal nodule that is stable. This is consistent with a myelo lipoma.  Pancreas: No mass. No peripancreatic fluid or peripancreatic inflammation.  Kidneys/ureters/bladder: There is a nonobstructing 4 mm stone in the proximal right ureter. There is no hydronephrosis or renal mass. No acute abnormality of the urinary bladder.  GI tract: There is no evidence of small bowel obstruction or focal small bowel wall thickening. The appendix is normal. There is fluid throughout the colon with areas of wall thickening. Findings are most consistent with colitis.  Pelvic organs: Unremarkable for age.  Lymph nodes/mesentery/retroperitoneum: No lymphadenopathy.  Abdominal wall: Abdominal wall intact.  Vascular: No aortic aneurysm.  Free fluid: There is a small amount of free fluid in the pelvis and trace perihepatic ascites.  Bones: Small sclerotic bone lesions are unchanged from the prior exam.      Impression: 1. Improved hepatic metastases. 2. Fluid-filled colon with areas of wall thickening most consistent with colitis. Favor an infectious or inflammatory etiology. 3. Nonobstructing stone in the proximal right ureter measuring 4 mm. 4. Stable sclerotic bone lesions. Sclerotic bone metastases are not excluded.      CTDI: 21.82 mGy DLP:1116.56 mGy.cm   This report was signed and finalized on 4/28/2024 2:48 PM by Ida Yi MD.                             Medical Decision Making  Amount and/or Complexity of Data Reviewed  Labs: ordered. Decision-making details documented in ED Course.  Radiology: ordered.    Risk  Prescription drug management.              Ladarius Jones is a 53 y.o. male who presents to the emergency department for evaluation of diarrhea    Differential diagnosis includes diverticulitis, colitis, enterocolitis, among other etiologies.    CBC,  CMP, magnesium, C. difficile and GI panel PCR, blood cultures x 2, CT scan abdomen pelvis ordered for further evaluation of the patient's presentation.    Chart review if available included outside testing, previous visits, prior labs, prior imaging, available notes from prior evaluations or visits with specialists, medication list, allergies, past medical history, past surgical history when applicable.    Patient was treated with   Medications   sodium chloride 0.9 % flush 10 mL (has no administration in time range)   sodium chloride 0.9 % bolus 1,000 mL (0 mL Intravenous Stopped 4/28/24 1346)   iopamidol (ISOVUE-300) 61 % injection 100 mL (100 mL Intravenous Given 4/28/24 1415)       Positive for Salmonella.  Noted bandemia however afebrile normotensive nontachycardic patient does not appear septic.  Suspect bandemia likely due to his recent chemotherapy regimen including dexamethasone.  Did discuss case with Dr. Zurita, will obtain blood cultures as a precaution but plan to discharge home the patient on Cipro with strict return to care precautions.  Patient to call oncology tomorrow to make them aware of his diagnosis.    Plan for disposition is discharged home.  Patient/family comfortable with and understanding of the plan.      Final diagnoses:   Salmonella gastroenteritis          Tomas Castellano PA-C  04/28/24 3361

## 2024-05-03 LAB
BACTERIA SPEC AEROBE CULT: NORMAL
BACTERIA SPEC AEROBE CULT: NORMAL

## 2024-05-06 RX ORDER — PANTOPRAZOLE SODIUM 40 MG/1
40 TABLET, DELAYED RELEASE ORAL DAILY
Qty: 90 TABLET | Refills: 1 | Status: SHIPPED | OUTPATIENT
Start: 2024-05-06

## 2024-05-07 ENCOUNTER — TRANSCRIBE ORDERS (OUTPATIENT)
Dept: LAB | Facility: HOSPITAL | Age: 54
End: 2024-05-07
Payer: COMMERCIAL

## 2024-05-07 ENCOUNTER — LAB (OUTPATIENT)
Dept: LAB | Facility: HOSPITAL | Age: 54
End: 2024-05-07
Payer: COMMERCIAL

## 2024-05-07 DIAGNOSIS — C18.6 MALIGNANT NEOPLASM OF DESCENDING COLON: ICD-10-CM

## 2024-05-07 DIAGNOSIS — C18.6 MALIGNANT NEOPLASM OF DESCENDING COLON: Primary | ICD-10-CM

## 2024-05-07 LAB
ALBUMIN SERPL-MCNC: 3.3 G/DL (ref 3.5–5.2)
ALBUMIN/GLOB SERPL: 1.3 G/DL
ALP SERPL-CCNC: 130 U/L (ref 39–117)
ALT SERPL W P-5'-P-CCNC: 39 U/L (ref 1–41)
ANION GAP SERPL CALCULATED.3IONS-SCNC: 10.6 MMOL/L (ref 5–15)
AST SERPL-CCNC: 48 U/L (ref 1–40)
BASOPHILS # BLD AUTO: 0.02 10*3/MM3 (ref 0–0.2)
BASOPHILS NFR BLD AUTO: 0.5 % (ref 0–1.5)
BILIRUB SERPL-MCNC: 0.4 MG/DL (ref 0–1.2)
BUN SERPL-MCNC: 11 MG/DL (ref 6–20)
BUN/CREAT SERPL: 10.6 (ref 7–25)
CALCIUM SPEC-SCNC: 8.7 MG/DL (ref 8.6–10.5)
CEA SERPL-MCNC: 6.14 NG/ML
CHLORIDE SERPL-SCNC: 103 MMOL/L (ref 98–107)
CO2 SERPL-SCNC: 23.4 MMOL/L (ref 22–29)
CREAT SERPL-MCNC: 1.04 MG/DL (ref 0.76–1.27)
DEPRECATED RDW RBC AUTO: 62.7 FL (ref 37–54)
EGFRCR SERPLBLD CKD-EPI 2021: 85.3 ML/MIN/1.73
EOSINOPHIL # BLD AUTO: 0.09 10*3/MM3 (ref 0–0.4)
EOSINOPHIL NFR BLD AUTO: 2.1 % (ref 0.3–6.2)
ERYTHROCYTE [DISTWIDTH] IN BLOOD BY AUTOMATED COUNT: 18 % (ref 12.3–15.4)
GLOBULIN UR ELPH-MCNC: 2.6 GM/DL
GLUCOSE SERPL-MCNC: 180 MG/DL (ref 65–99)
HCT VFR BLD AUTO: 34.3 % (ref 37.5–51)
HGB BLD-MCNC: 11 G/DL (ref 13–17.7)
IMM GRANULOCYTES # BLD AUTO: 0.05 10*3/MM3 (ref 0–0.05)
IMM GRANULOCYTES NFR BLD AUTO: 1.2 % (ref 0–0.5)
LYMPHOCYTES # BLD AUTO: 1.31 10*3/MM3 (ref 0.7–3.1)
LYMPHOCYTES NFR BLD AUTO: 31 % (ref 19.6–45.3)
MCH RBC QN AUTO: 30.4 PG (ref 26.6–33)
MCHC RBC AUTO-ENTMCNC: 32.1 G/DL (ref 31.5–35.7)
MCV RBC AUTO: 94.8 FL (ref 79–97)
MONOCYTES # BLD AUTO: 0.54 10*3/MM3 (ref 0.1–0.9)
MONOCYTES NFR BLD AUTO: 12.8 % (ref 5–12)
NEUTROPHILS NFR BLD AUTO: 2.21 10*3/MM3 (ref 1.7–7)
NEUTROPHILS NFR BLD AUTO: 52.4 % (ref 42.7–76)
NRBC BLD AUTO-RTO: 0 /100 WBC (ref 0–0.2)
PLATELET # BLD AUTO: 132 10*3/MM3 (ref 140–450)
PMV BLD AUTO: 10.8 FL (ref 6–12)
POTASSIUM SERPL-SCNC: 4.2 MMOL/L (ref 3.5–5.2)
PROT SERPL-MCNC: 5.9 G/DL (ref 6–8.5)
RBC # BLD AUTO: 3.62 10*6/MM3 (ref 4.14–5.8)
SODIUM SERPL-SCNC: 137 MMOL/L (ref 136–145)
WBC NRBC COR # BLD AUTO: 4.22 10*3/MM3 (ref 3.4–10.8)

## 2024-05-07 PROCEDURE — 36415 COLL VENOUS BLD VENIPUNCTURE: CPT

## 2024-05-07 PROCEDURE — 85025 COMPLETE CBC W/AUTO DIFF WBC: CPT

## 2024-05-07 PROCEDURE — 80053 COMPREHEN METABOLIC PANEL: CPT

## 2024-05-07 PROCEDURE — 82378 CARCINOEMBRYONIC ANTIGEN: CPT

## 2024-05-21 ENCOUNTER — TRANSCRIBE ORDERS (OUTPATIENT)
Dept: LAB | Facility: HOSPITAL | Age: 54
End: 2024-05-21
Payer: COMMERCIAL

## 2024-05-21 ENCOUNTER — LAB (OUTPATIENT)
Dept: LAB | Facility: HOSPITAL | Age: 54
End: 2024-05-21
Payer: COMMERCIAL

## 2024-05-21 DIAGNOSIS — C18.6 MALIGNANT NEOPLASM OF DESCENDING COLON: Primary | ICD-10-CM

## 2024-05-21 DIAGNOSIS — C18.6 MALIGNANT NEOPLASM OF DESCENDING COLON: ICD-10-CM

## 2024-05-21 LAB
ALBUMIN SERPL-MCNC: 3.8 G/DL (ref 3.5–5.2)
ALBUMIN/GLOB SERPL: 1.4 G/DL
ALP SERPL-CCNC: 132 U/L (ref 39–117)
ALT SERPL W P-5'-P-CCNC: 52 U/L (ref 1–41)
ANION GAP SERPL CALCULATED.3IONS-SCNC: 12 MMOL/L (ref 5–15)
AST SERPL-CCNC: 49 U/L (ref 1–40)
BASOPHILS # BLD AUTO: 0.01 10*3/MM3 (ref 0–0.2)
BASOPHILS NFR BLD AUTO: 0.2 % (ref 0–1.5)
BILIRUB SERPL-MCNC: 0.6 MG/DL (ref 0–1.2)
BUN SERPL-MCNC: 14 MG/DL (ref 6–20)
BUN/CREAT SERPL: 15.1 (ref 7–25)
CALCIUM SPEC-SCNC: 9.4 MG/DL (ref 8.6–10.5)
CHLORIDE SERPL-SCNC: 104 MMOL/L (ref 98–107)
CO2 SERPL-SCNC: 25 MMOL/L (ref 22–29)
CREAT SERPL-MCNC: 0.93 MG/DL (ref 0.76–1.27)
DEPRECATED RDW RBC AUTO: 60.2 FL (ref 37–54)
EGFRCR SERPLBLD CKD-EPI 2021: 97.6 ML/MIN/1.73
EOSINOPHIL # BLD AUTO: 0.16 10*3/MM3 (ref 0–0.4)
EOSINOPHIL NFR BLD AUTO: 3.8 % (ref 0.3–6.2)
ERYTHROCYTE [DISTWIDTH] IN BLOOD BY AUTOMATED COUNT: 18.1 % (ref 12.3–15.4)
GLOBULIN UR ELPH-MCNC: 2.7 GM/DL
GLUCOSE SERPL-MCNC: 115 MG/DL (ref 65–99)
HCT VFR BLD AUTO: 35.1 % (ref 37.5–51)
HGB BLD-MCNC: 11.5 G/DL (ref 13–17.7)
IMM GRANULOCYTES # BLD AUTO: 0.01 10*3/MM3 (ref 0–0.05)
IMM GRANULOCYTES NFR BLD AUTO: 0.2 % (ref 0–0.5)
LYMPHOCYTES # BLD AUTO: 1.31 10*3/MM3 (ref 0.7–3.1)
LYMPHOCYTES NFR BLD AUTO: 31.4 % (ref 19.6–45.3)
MCH RBC QN AUTO: 29.9 PG (ref 26.6–33)
MCHC RBC AUTO-ENTMCNC: 32.8 G/DL (ref 31.5–35.7)
MCV RBC AUTO: 91.2 FL (ref 79–97)
MONOCYTES # BLD AUTO: 0.45 10*3/MM3 (ref 0.1–0.9)
MONOCYTES NFR BLD AUTO: 10.8 % (ref 5–12)
NEUTROPHILS NFR BLD AUTO: 2.23 10*3/MM3 (ref 1.7–7)
NEUTROPHILS NFR BLD AUTO: 53.6 % (ref 42.7–76)
NRBC BLD AUTO-RTO: 0 /100 WBC (ref 0–0.2)
PLATELET # BLD AUTO: 92 10*3/MM3 (ref 140–450)
PMV BLD AUTO: 9.4 FL (ref 6–12)
POTASSIUM SERPL-SCNC: 4.4 MMOL/L (ref 3.5–5.2)
PROT SERPL-MCNC: 6.5 G/DL (ref 6–8.5)
RBC # BLD AUTO: 3.85 10*6/MM3 (ref 4.14–5.8)
SODIUM SERPL-SCNC: 141 MMOL/L (ref 136–145)
WBC NRBC COR # BLD AUTO: 4.17 10*3/MM3 (ref 3.4–10.8)

## 2024-05-21 PROCEDURE — 80053 COMPREHEN METABOLIC PANEL: CPT

## 2024-05-21 PROCEDURE — 36415 COLL VENOUS BLD VENIPUNCTURE: CPT

## 2024-05-21 PROCEDURE — 85025 COMPLETE CBC W/AUTO DIFF WBC: CPT

## 2024-06-04 ENCOUNTER — TRANSCRIBE ORDERS (OUTPATIENT)
Dept: LAB | Facility: HOSPITAL | Age: 54
End: 2024-06-04
Payer: COMMERCIAL

## 2024-06-04 ENCOUNTER — LAB (OUTPATIENT)
Dept: LAB | Facility: HOSPITAL | Age: 54
End: 2024-06-04
Payer: COMMERCIAL

## 2024-06-04 DIAGNOSIS — C18.6 CANCER OF DESCENDING COLON: ICD-10-CM

## 2024-06-04 DIAGNOSIS — C18.6 CANCER OF DESCENDING COLON: Primary | ICD-10-CM

## 2024-06-04 LAB
ALBUMIN SERPL-MCNC: 3.5 G/DL (ref 3.5–5.2)
ALBUMIN/GLOB SERPL: 1.3 G/DL
ALP SERPL-CCNC: 148 U/L (ref 39–117)
ALT SERPL W P-5'-P-CCNC: 60 U/L (ref 1–41)
ANION GAP SERPL CALCULATED.3IONS-SCNC: 11.2 MMOL/L (ref 5–15)
AST SERPL-CCNC: 54 U/L (ref 1–40)
BASOPHILS # BLD AUTO: 0.02 10*3/MM3 (ref 0–0.2)
BASOPHILS NFR BLD AUTO: 0.5 % (ref 0–1.5)
BILIRUB SERPL-MCNC: 0.6 MG/DL (ref 0–1.2)
BUN SERPL-MCNC: 14 MG/DL (ref 6–20)
BUN/CREAT SERPL: 15.4 (ref 7–25)
CALCIUM SPEC-SCNC: 9.1 MG/DL (ref 8.6–10.5)
CEA SERPL-MCNC: 5.15 NG/ML
CHLORIDE SERPL-SCNC: 99 MMOL/L (ref 98–107)
CO2 SERPL-SCNC: 24.8 MMOL/L (ref 22–29)
CREAT SERPL-MCNC: 0.91 MG/DL (ref 0.76–1.27)
DEPRECATED RDW RBC AUTO: 63.7 FL (ref 37–54)
EGFRCR SERPLBLD CKD-EPI 2021: 100.2 ML/MIN/1.73
EOSINOPHIL # BLD AUTO: 0.05 10*3/MM3 (ref 0–0.4)
EOSINOPHIL NFR BLD AUTO: 1.4 % (ref 0.3–6.2)
ERYTHROCYTE [DISTWIDTH] IN BLOOD BY AUTOMATED COUNT: 18.9 % (ref 12.3–15.4)
GLOBULIN UR ELPH-MCNC: 2.8 GM/DL
GLUCOSE SERPL-MCNC: 138 MG/DL (ref 65–99)
HCT VFR BLD AUTO: 35.4 % (ref 37.5–51)
HGB BLD-MCNC: 11.6 G/DL (ref 13–17.7)
IMM GRANULOCYTES # BLD AUTO: 0.01 10*3/MM3 (ref 0–0.05)
IMM GRANULOCYTES NFR BLD AUTO: 0.3 % (ref 0–0.5)
LYMPHOCYTES # BLD AUTO: 1.24 10*3/MM3 (ref 0.7–3.1)
LYMPHOCYTES NFR BLD AUTO: 33.7 % (ref 19.6–45.3)
MCH RBC QN AUTO: 30.4 PG (ref 26.6–33)
MCHC RBC AUTO-ENTMCNC: 32.8 G/DL (ref 31.5–35.7)
MCV RBC AUTO: 92.7 FL (ref 79–97)
MONOCYTES # BLD AUTO: 0.63 10*3/MM3 (ref 0.1–0.9)
MONOCYTES NFR BLD AUTO: 17.1 % (ref 5–12)
NEUTROPHILS NFR BLD AUTO: 1.73 10*3/MM3 (ref 1.7–7)
NEUTROPHILS NFR BLD AUTO: 47 % (ref 42.7–76)
NRBC BLD AUTO-RTO: 0 /100 WBC (ref 0–0.2)
PLATELET # BLD AUTO: 103 10*3/MM3 (ref 140–450)
PMV BLD AUTO: 11.4 FL (ref 6–12)
POTASSIUM SERPL-SCNC: 4.5 MMOL/L (ref 3.5–5.2)
PROT SERPL-MCNC: 6.3 G/DL (ref 6–8.5)
RBC # BLD AUTO: 3.82 10*6/MM3 (ref 4.14–5.8)
SODIUM SERPL-SCNC: 135 MMOL/L (ref 136–145)
WBC NRBC COR # BLD AUTO: 3.68 10*3/MM3 (ref 3.4–10.8)

## 2024-06-04 PROCEDURE — 80053 COMPREHEN METABOLIC PANEL: CPT

## 2024-06-04 PROCEDURE — 82378 CARCINOEMBRYONIC ANTIGEN: CPT

## 2024-06-04 PROCEDURE — 36415 COLL VENOUS BLD VENIPUNCTURE: CPT

## 2024-06-04 PROCEDURE — 85025 COMPLETE CBC W/AUTO DIFF WBC: CPT

## 2024-06-25 ENCOUNTER — LAB (OUTPATIENT)
Dept: LAB | Facility: HOSPITAL | Age: 54
End: 2024-06-25
Payer: COMMERCIAL

## 2024-06-25 ENCOUNTER — TRANSCRIBE ORDERS (OUTPATIENT)
Dept: LAB | Facility: HOSPITAL | Age: 54
End: 2024-06-25
Payer: COMMERCIAL

## 2024-06-25 DIAGNOSIS — C18.6 MALIGNANT NEOPLASM OF DESCENDING COLON: Primary | ICD-10-CM

## 2024-06-25 DIAGNOSIS — C18.6 MALIGNANT NEOPLASM OF DESCENDING COLON: ICD-10-CM

## 2024-06-25 LAB
ALBUMIN SERPL-MCNC: 3.6 G/DL (ref 3.5–5.2)
ALBUMIN/GLOB SERPL: 1.3 G/DL
ALP SERPL-CCNC: 124 U/L (ref 39–117)
ALT SERPL W P-5'-P-CCNC: 42 U/L (ref 1–41)
ANION GAP SERPL CALCULATED.3IONS-SCNC: 9.2 MMOL/L (ref 5–15)
AST SERPL-CCNC: 56 U/L (ref 1–40)
BASOPHILS # BLD AUTO: 0.02 10*3/MM3 (ref 0–0.2)
BASOPHILS NFR BLD AUTO: 0.4 % (ref 0–1.5)
BILIRUB SERPL-MCNC: 0.5 MG/DL (ref 0–1.2)
BILIRUB UR QL STRIP: NEGATIVE
BUN SERPL-MCNC: 15 MG/DL (ref 6–20)
BUN/CREAT SERPL: 15.5 (ref 7–25)
CALCIUM SPEC-SCNC: 8.5 MG/DL (ref 8.6–10.5)
CHLORIDE SERPL-SCNC: 105 MMOL/L (ref 98–107)
CLARITY UR: CLEAR
CO2 SERPL-SCNC: 23.8 MMOL/L (ref 22–29)
COLOR UR: YELLOW
CREAT SERPL-MCNC: 0.97 MG/DL (ref 0.76–1.27)
DEPRECATED RDW RBC AUTO: 62.2 FL (ref 37–54)
EGFRCR SERPLBLD CKD-EPI 2021: 92.8 ML/MIN/1.73
EOSINOPHIL # BLD AUTO: 0.13 10*3/MM3 (ref 0–0.4)
EOSINOPHIL NFR BLD AUTO: 2.6 % (ref 0.3–6.2)
ERYTHROCYTE [DISTWIDTH] IN BLOOD BY AUTOMATED COUNT: 18.8 % (ref 12.3–15.4)
GLOBULIN UR ELPH-MCNC: 2.8 GM/DL
GLUCOSE SERPL-MCNC: 102 MG/DL (ref 65–99)
GLUCOSE UR STRIP-MCNC: NEGATIVE MG/DL
HCT VFR BLD AUTO: 35.4 % (ref 37.5–51)
HGB BLD-MCNC: 11.5 G/DL (ref 13–17.7)
HGB UR QL STRIP.AUTO: NEGATIVE
IMM GRANULOCYTES # BLD AUTO: 0.01 10*3/MM3 (ref 0–0.05)
IMM GRANULOCYTES NFR BLD AUTO: 0.2 % (ref 0–0.5)
KETONES UR QL STRIP: NEGATIVE
LEUKOCYTE ESTERASE UR QL STRIP.AUTO: ABNORMAL
LYMPHOCYTES # BLD AUTO: 1.4 10*3/MM3 (ref 0.7–3.1)
LYMPHOCYTES NFR BLD AUTO: 28.2 % (ref 19.6–45.3)
MCH RBC QN AUTO: 29.3 PG (ref 26.6–33)
MCHC RBC AUTO-ENTMCNC: 32.5 G/DL (ref 31.5–35.7)
MCV RBC AUTO: 90.1 FL (ref 79–97)
MONOCYTES # BLD AUTO: 0.76 10*3/MM3 (ref 0.1–0.9)
MONOCYTES NFR BLD AUTO: 15.3 % (ref 5–12)
NEUTROPHILS NFR BLD AUTO: 2.64 10*3/MM3 (ref 1.7–7)
NEUTROPHILS NFR BLD AUTO: 53.3 % (ref 42.7–76)
NITRITE UR QL STRIP: NEGATIVE
NRBC BLD AUTO-RTO: 0 /100 WBC (ref 0–0.2)
PH UR STRIP.AUTO: 6.5 [PH] (ref 5–8)
PLATELET # BLD AUTO: 123 10*3/MM3 (ref 140–450)
PMV BLD AUTO: 10.7 FL (ref 6–12)
POTASSIUM SERPL-SCNC: 4.2 MMOL/L (ref 3.5–5.2)
PROT SERPL-MCNC: 6.4 G/DL (ref 6–8.5)
PROT UR QL STRIP: NEGATIVE
RBC # BLD AUTO: 3.93 10*6/MM3 (ref 4.14–5.8)
SODIUM SERPL-SCNC: 138 MMOL/L (ref 136–145)
SP GR UR STRIP: 1.02 (ref 1–1.03)
UROBILINOGEN UR QL STRIP: ABNORMAL
WBC NRBC COR # BLD AUTO: 4.96 10*3/MM3 (ref 3.4–10.8)

## 2024-06-25 PROCEDURE — 81003 URINALYSIS AUTO W/O SCOPE: CPT

## 2024-06-25 PROCEDURE — 36415 COLL VENOUS BLD VENIPUNCTURE: CPT

## 2024-06-25 PROCEDURE — 80053 COMPREHEN METABOLIC PANEL: CPT

## 2024-06-25 PROCEDURE — 85025 COMPLETE CBC W/AUTO DIFF WBC: CPT

## 2024-07-09 ENCOUNTER — TRANSCRIBE ORDERS (OUTPATIENT)
Dept: LAB | Facility: HOSPITAL | Age: 54
End: 2024-07-09
Payer: COMMERCIAL

## 2024-07-09 ENCOUNTER — LAB (OUTPATIENT)
Dept: LAB | Facility: HOSPITAL | Age: 54
End: 2024-07-09
Payer: COMMERCIAL

## 2024-07-09 DIAGNOSIS — C18.6 MALIGNANT NEOPLASM OF DESCENDING COLON: ICD-10-CM

## 2024-07-09 DIAGNOSIS — C18.6 MALIGNANT NEOPLASM OF DESCENDING COLON: Primary | ICD-10-CM

## 2024-07-09 LAB
ALBUMIN SERPL-MCNC: 3.7 G/DL (ref 3.5–5.2)
ALBUMIN/GLOB SERPL: 1.3 G/DL
ALP SERPL-CCNC: 123 U/L (ref 39–117)
ALT SERPL W P-5'-P-CCNC: 49 U/L (ref 1–41)
ANION GAP SERPL CALCULATED.3IONS-SCNC: 10.9 MMOL/L (ref 5–15)
AST SERPL-CCNC: 49 U/L (ref 1–40)
BASOPHILS # BLD AUTO: 0.02 10*3/MM3 (ref 0–0.2)
BASOPHILS NFR BLD AUTO: 0.4 % (ref 0–1.5)
BILIRUB SERPL-MCNC: 0.9 MG/DL (ref 0–1.2)
BILIRUB UR QL STRIP: NEGATIVE
BUN SERPL-MCNC: 13 MG/DL (ref 6–20)
BUN/CREAT SERPL: 12.1 (ref 7–25)
CALCIUM SPEC-SCNC: 9 MG/DL (ref 8.6–10.5)
CEA SERPL-MCNC: 5.01 NG/ML
CHLORIDE SERPL-SCNC: 102 MMOL/L (ref 98–107)
CLARITY UR: CLEAR
CO2 SERPL-SCNC: 25.1 MMOL/L (ref 22–29)
COLOR UR: YELLOW
CREAT SERPL-MCNC: 1.07 MG/DL (ref 0.76–1.27)
DEPRECATED RDW RBC AUTO: 62.7 FL (ref 37–54)
EGFRCR SERPLBLD CKD-EPI 2021: 82.5 ML/MIN/1.73
EOSINOPHIL # BLD AUTO: 0.1 10*3/MM3 (ref 0–0.4)
EOSINOPHIL NFR BLD AUTO: 2 % (ref 0.3–6.2)
ERYTHROCYTE [DISTWIDTH] IN BLOOD BY AUTOMATED COUNT: 19.1 % (ref 12.3–15.4)
GLOBULIN UR ELPH-MCNC: 2.8 GM/DL
GLUCOSE SERPL-MCNC: 114 MG/DL (ref 65–99)
GLUCOSE UR STRIP-MCNC: NEGATIVE MG/DL
HCT VFR BLD AUTO: 35.3 % (ref 37.5–51)
HGB BLD-MCNC: 11.4 G/DL (ref 13–17.7)
HGB UR QL STRIP.AUTO: NEGATIVE
IMM GRANULOCYTES # BLD AUTO: 0.01 10*3/MM3 (ref 0–0.05)
IMM GRANULOCYTES NFR BLD AUTO: 0.2 % (ref 0–0.5)
KETONES UR QL STRIP: NEGATIVE
LEUKOCYTE ESTERASE UR QL STRIP.AUTO: ABNORMAL
LYMPHOCYTES # BLD AUTO: 1.39 10*3/MM3 (ref 0.7–3.1)
LYMPHOCYTES NFR BLD AUTO: 27.4 % (ref 19.6–45.3)
MCH RBC QN AUTO: 29.1 PG (ref 26.6–33)
MCHC RBC AUTO-ENTMCNC: 32.3 G/DL (ref 31.5–35.7)
MCV RBC AUTO: 90.1 FL (ref 79–97)
MONOCYTES # BLD AUTO: 0.68 10*3/MM3 (ref 0.1–0.9)
MONOCYTES NFR BLD AUTO: 13.4 % (ref 5–12)
NEUTROPHILS NFR BLD AUTO: 2.88 10*3/MM3 (ref 1.7–7)
NEUTROPHILS NFR BLD AUTO: 56.6 % (ref 42.7–76)
NITRITE UR QL STRIP: NEGATIVE
NRBC BLD AUTO-RTO: 0 /100 WBC (ref 0–0.2)
PH UR STRIP.AUTO: 6 [PH] (ref 5–8)
PLATELET # BLD AUTO: 111 10*3/MM3 (ref 140–450)
PMV BLD AUTO: 11.3 FL (ref 6–12)
POTASSIUM SERPL-SCNC: 4.6 MMOL/L (ref 3.5–5.2)
PROT SERPL-MCNC: 6.5 G/DL (ref 6–8.5)
PROT UR QL STRIP: NEGATIVE
RBC # BLD AUTO: 3.92 10*6/MM3 (ref 4.14–5.8)
SODIUM SERPL-SCNC: 138 MMOL/L (ref 136–145)
SP GR UR STRIP: 1.02 (ref 1–1.03)
UROBILINOGEN UR QL STRIP: ABNORMAL
WBC NRBC COR # BLD AUTO: 5.08 10*3/MM3 (ref 3.4–10.8)

## 2024-07-09 PROCEDURE — 36415 COLL VENOUS BLD VENIPUNCTURE: CPT

## 2024-07-09 PROCEDURE — 80053 COMPREHEN METABOLIC PANEL: CPT

## 2024-07-09 PROCEDURE — 81003 URINALYSIS AUTO W/O SCOPE: CPT

## 2024-07-09 PROCEDURE — 85025 COMPLETE CBC W/AUTO DIFF WBC: CPT

## 2024-07-09 PROCEDURE — 82378 CARCINOEMBRYONIC ANTIGEN: CPT

## 2024-07-23 ENCOUNTER — TRANSCRIBE ORDERS (OUTPATIENT)
Dept: LAB | Facility: HOSPITAL | Age: 54
End: 2024-07-23
Payer: COMMERCIAL

## 2024-07-23 ENCOUNTER — LAB (OUTPATIENT)
Dept: LAB | Facility: HOSPITAL | Age: 54
End: 2024-07-23
Payer: COMMERCIAL

## 2024-07-23 DIAGNOSIS — C18.6 MALIGNANT NEOPLASM OF DESCENDING COLON: Primary | ICD-10-CM

## 2024-07-23 DIAGNOSIS — C18.6 MALIGNANT NEOPLASM OF DESCENDING COLON: ICD-10-CM

## 2024-07-23 LAB
ALBUMIN SERPL-MCNC: 3.4 G/DL (ref 3.5–5.2)
ALBUMIN/GLOB SERPL: 1.3 G/DL
ALP SERPL-CCNC: 137 U/L (ref 39–117)
ALT SERPL W P-5'-P-CCNC: 45 U/L (ref 1–41)
ANION GAP SERPL CALCULATED.3IONS-SCNC: 12.6 MMOL/L (ref 5–15)
AST SERPL-CCNC: 41 U/L (ref 1–40)
BASOPHILS # BLD AUTO: 0.02 10*3/MM3 (ref 0–0.2)
BASOPHILS NFR BLD AUTO: 0.5 % (ref 0–1.5)
BILIRUB SERPL-MCNC: 0.4 MG/DL (ref 0–1.2)
BILIRUB UR QL STRIP: NEGATIVE
BUN SERPL-MCNC: 17 MG/DL (ref 6–20)
BUN/CREAT SERPL: 18.3 (ref 7–25)
CALCIUM SPEC-SCNC: 8.8 MG/DL (ref 8.6–10.5)
CEA SERPL-MCNC: 4.98 NG/ML
CHLORIDE SERPL-SCNC: 102 MMOL/L (ref 98–107)
CLARITY UR: CLEAR
CO2 SERPL-SCNC: 24.4 MMOL/L (ref 22–29)
COLOR UR: YELLOW
CREAT SERPL-MCNC: 0.93 MG/DL (ref 0.76–1.27)
DEPRECATED RDW RBC AUTO: 62.5 FL (ref 37–54)
EGFRCR SERPLBLD CKD-EPI 2021: 97.6 ML/MIN/1.73
EOSINOPHIL # BLD AUTO: 0.1 10*3/MM3 (ref 0–0.4)
EOSINOPHIL NFR BLD AUTO: 2.4 % (ref 0.3–6.2)
ERYTHROCYTE [DISTWIDTH] IN BLOOD BY AUTOMATED COUNT: 19.6 % (ref 12.3–15.4)
GLOBULIN UR ELPH-MCNC: 2.6 GM/DL
GLUCOSE SERPL-MCNC: 143 MG/DL (ref 65–99)
GLUCOSE UR STRIP-MCNC: NEGATIVE MG/DL
HCT VFR BLD AUTO: 33.5 % (ref 37.5–51)
HGB BLD-MCNC: 11 G/DL (ref 13–17.7)
HGB UR QL STRIP.AUTO: NEGATIVE
IMM GRANULOCYTES # BLD AUTO: 0.01 10*3/MM3 (ref 0–0.05)
IMM GRANULOCYTES NFR BLD AUTO: 0.2 % (ref 0–0.5)
KETONES UR QL STRIP: NEGATIVE
LEUKOCYTE ESTERASE UR QL STRIP.AUTO: ABNORMAL
LYMPHOCYTES # BLD AUTO: 1.37 10*3/MM3 (ref 0.7–3.1)
LYMPHOCYTES NFR BLD AUTO: 33.3 % (ref 19.6–45.3)
MCH RBC QN AUTO: 28.9 PG (ref 26.6–33)
MCHC RBC AUTO-ENTMCNC: 32.8 G/DL (ref 31.5–35.7)
MCV RBC AUTO: 88.2 FL (ref 79–97)
MONOCYTES # BLD AUTO: 0.49 10*3/MM3 (ref 0.1–0.9)
MONOCYTES NFR BLD AUTO: 11.9 % (ref 5–12)
NEUTROPHILS NFR BLD AUTO: 2.13 10*3/MM3 (ref 1.7–7)
NEUTROPHILS NFR BLD AUTO: 51.7 % (ref 42.7–76)
NITRITE UR QL STRIP: NEGATIVE
NRBC BLD AUTO-RTO: 0 /100 WBC (ref 0–0.2)
PH UR STRIP.AUTO: 6.5 [PH] (ref 5–8)
PLATELET # BLD AUTO: 114 10*3/MM3 (ref 140–450)
PMV BLD AUTO: 11 FL (ref 6–12)
POTASSIUM SERPL-SCNC: 4.9 MMOL/L (ref 3.5–5.2)
PROT SERPL-MCNC: 6 G/DL (ref 6–8.5)
PROT UR QL STRIP: NEGATIVE
RBC # BLD AUTO: 3.8 10*6/MM3 (ref 4.14–5.8)
SODIUM SERPL-SCNC: 139 MMOL/L (ref 136–145)
SP GR UR STRIP: 1.01 (ref 1–1.03)
UROBILINOGEN UR QL STRIP: ABNORMAL
WBC NRBC COR # BLD AUTO: 4.12 10*3/MM3 (ref 3.4–10.8)

## 2024-07-23 PROCEDURE — 85025 COMPLETE CBC W/AUTO DIFF WBC: CPT

## 2024-07-23 PROCEDURE — 36415 COLL VENOUS BLD VENIPUNCTURE: CPT

## 2024-07-23 PROCEDURE — 81003 URINALYSIS AUTO W/O SCOPE: CPT

## 2024-07-23 PROCEDURE — 80053 COMPREHEN METABOLIC PANEL: CPT

## 2024-07-23 PROCEDURE — 82378 CARCINOEMBRYONIC ANTIGEN: CPT

## 2024-08-13 ENCOUNTER — LAB (OUTPATIENT)
Dept: LAB | Facility: HOSPITAL | Age: 54
End: 2024-08-13
Payer: COMMERCIAL

## 2024-08-13 ENCOUNTER — TRANSCRIBE ORDERS (OUTPATIENT)
Dept: LAB | Facility: HOSPITAL | Age: 54
End: 2024-08-13
Payer: COMMERCIAL

## 2024-08-13 DIAGNOSIS — E78.5 HYPERLIPIDEMIA, UNSPECIFIED HYPERLIPIDEMIA TYPE: ICD-10-CM

## 2024-08-13 DIAGNOSIS — I10 PRIMARY HYPERTENSION: ICD-10-CM

## 2024-08-13 DIAGNOSIS — E03.9 HYPOTHYROIDISM, UNSPECIFIED TYPE: ICD-10-CM

## 2024-08-13 DIAGNOSIS — D50.9 IRON DEFICIENCY ANEMIA, UNSPECIFIED IRON DEFICIENCY ANEMIA TYPE: ICD-10-CM

## 2024-08-13 DIAGNOSIS — E79.0 HYPERURICEMIA: ICD-10-CM

## 2024-08-13 DIAGNOSIS — C18.6 MALIGNANT NEOPLASM OF DESCENDING COLON: Primary | ICD-10-CM

## 2024-08-13 DIAGNOSIS — I10 PRIMARY HYPERTENSION: Primary | ICD-10-CM

## 2024-08-13 DIAGNOSIS — C18.6 MALIGNANT NEOPLASM OF DESCENDING COLON: ICD-10-CM

## 2024-08-13 DIAGNOSIS — Z12.5 SPECIAL SCREENING FOR MALIGNANT NEOPLASM OF PROSTATE: ICD-10-CM

## 2024-08-13 DIAGNOSIS — E11.9 DIABETES MELLITUS WITHOUT COMPLICATION: ICD-10-CM

## 2024-08-13 LAB
ALBUMIN SERPL-MCNC: 3.5 G/DL (ref 3.5–5.2)
ALBUMIN UR-MCNC: <1.2 MG/DL
ALBUMIN/GLOB SERPL: 1.3 G/DL
ALP SERPL-CCNC: 115 U/L (ref 39–117)
ALT SERPL W P-5'-P-CCNC: 34 U/L (ref 1–41)
ANION GAP SERPL CALCULATED.3IONS-SCNC: 11 MMOL/L (ref 5–15)
AST SERPL-CCNC: 47 U/L (ref 1–40)
BACTERIA UR QL AUTO: ABNORMAL /HPF
BASOPHILS # BLD AUTO: 0.04 10*3/MM3 (ref 0–0.2)
BASOPHILS NFR BLD AUTO: 0.7 % (ref 0–1.5)
BILIRUB SERPL-MCNC: 0.6 MG/DL (ref 0–1.2)
BILIRUB UR QL STRIP: NEGATIVE
BUN SERPL-MCNC: 16 MG/DL (ref 6–20)
BUN/CREAT SERPL: 15.2 (ref 7–25)
CALCIUM SPEC-SCNC: 8.9 MG/DL (ref 8.6–10.5)
CEA SERPL-MCNC: 4.75 NG/ML
CHLORIDE SERPL-SCNC: 105 MMOL/L (ref 98–107)
CHOLEST SERPL-MCNC: 148 MG/DL (ref 0–200)
CLARITY UR: CLEAR
CO2 SERPL-SCNC: 23 MMOL/L (ref 22–29)
COLOR UR: YELLOW
CREAT SERPL-MCNC: 1.05 MG/DL (ref 0.76–1.27)
CREAT UR-MCNC: 26 MG/DL
DEPRECATED RDW RBC AUTO: 65.5 FL (ref 37–54)
EGFRCR SERPLBLD CKD-EPI 2021: 84.4 ML/MIN/1.73
EOSINOPHIL # BLD AUTO: 0.16 10*3/MM3 (ref 0–0.4)
EOSINOPHIL NFR BLD AUTO: 2.6 % (ref 0.3–6.2)
ERYTHROCYTE [DISTWIDTH] IN BLOOD BY AUTOMATED COUNT: 19.9 % (ref 12.3–15.4)
FOLATE SERPL-MCNC: >20 NG/ML (ref 4.78–24.2)
GLOBULIN UR ELPH-MCNC: 2.8 GM/DL
GLUCOSE SERPL-MCNC: 112 MG/DL (ref 65–99)
GLUCOSE UR STRIP-MCNC: NEGATIVE MG/DL
HBA1C MFR BLD: 6 % (ref 4.8–5.6)
HCT VFR BLD AUTO: 35.4 % (ref 37.5–51)
HDLC SERPL-MCNC: 46 MG/DL (ref 40–60)
HGB BLD-MCNC: 11.6 G/DL (ref 13–17.7)
HGB UR QL STRIP.AUTO: NEGATIVE
HYALINE CASTS UR QL AUTO: ABNORMAL /LPF
IMM GRANULOCYTES # BLD AUTO: 0.04 10*3/MM3 (ref 0–0.05)
IMM GRANULOCYTES NFR BLD AUTO: 0.7 % (ref 0–0.5)
IRON 24H UR-MRATE: 73 MCG/DL (ref 59–158)
KETONES UR QL STRIP: NEGATIVE
LDLC SERPL CALC-MCNC: 82 MG/DL (ref 0–100)
LDLC/HDLC SERPL: 1.73 {RATIO}
LEUKOCYTE ESTERASE UR QL STRIP.AUTO: ABNORMAL
LYMPHOCYTES # BLD AUTO: 1.6 10*3/MM3 (ref 0.7–3.1)
LYMPHOCYTES NFR BLD AUTO: 26.3 % (ref 19.6–45.3)
MCH RBC QN AUTO: 29.1 PG (ref 26.6–33)
MCHC RBC AUTO-ENTMCNC: 32.8 G/DL (ref 31.5–35.7)
MCV RBC AUTO: 88.7 FL (ref 79–97)
MICROALBUMIN/CREAT UR: NORMAL MG/G{CREAT}
MONOCYTES # BLD AUTO: 0.71 10*3/MM3 (ref 0.1–0.9)
MONOCYTES NFR BLD AUTO: 11.7 % (ref 5–12)
NEUTROPHILS NFR BLD AUTO: 3.54 10*3/MM3 (ref 1.7–7)
NEUTROPHILS NFR BLD AUTO: 58 % (ref 42.7–76)
NITRITE UR QL STRIP: NEGATIVE
NRBC BLD AUTO-RTO: 0 /100 WBC (ref 0–0.2)
PH UR STRIP.AUTO: 6 [PH] (ref 5–8)
PLATELET # BLD AUTO: 143 10*3/MM3 (ref 140–450)
PMV BLD AUTO: 11.4 FL (ref 6–12)
POTASSIUM SERPL-SCNC: 4.3 MMOL/L (ref 3.5–5.2)
PROT SERPL-MCNC: 6.3 G/DL (ref 6–8.5)
PROT UR QL STRIP: NEGATIVE
PSA SERPL-MCNC: 0.65 NG/ML (ref 0–4)
RBC # BLD AUTO: 3.99 10*6/MM3 (ref 4.14–5.8)
RBC # UR STRIP: ABNORMAL /HPF
REF LAB TEST METHOD: ABNORMAL
SODIUM SERPL-SCNC: 139 MMOL/L (ref 136–145)
SP GR UR STRIP: <=1.005 (ref 1–1.03)
SQUAMOUS #/AREA URNS HPF: ABNORMAL /HPF
TRIGL SERPL-MCNC: 113 MG/DL (ref 0–150)
TSH SERPL DL<=0.05 MIU/L-ACNC: 8.2 UIU/ML (ref 0.27–4.2)
URATE SERPL-MCNC: 5.3 MG/DL (ref 3.4–7)
UROBILINOGEN UR QL STRIP: ABNORMAL
VIT B12 BLD-MCNC: 555 PG/ML (ref 211–946)
VLDLC SERPL-MCNC: 20 MG/DL (ref 5–40)
WBC # UR STRIP: ABNORMAL /HPF
WBC NRBC COR # BLD AUTO: 6.09 10*3/MM3 (ref 3.4–10.8)

## 2024-08-13 PROCEDURE — G0103 PSA SCREENING: HCPCS

## 2024-08-13 PROCEDURE — 36415 COLL VENOUS BLD VENIPUNCTURE: CPT

## 2024-08-13 PROCEDURE — 81001 URINALYSIS AUTO W/SCOPE: CPT

## 2024-08-13 PROCEDURE — 82570 ASSAY OF URINE CREATININE: CPT

## 2024-08-13 PROCEDURE — 82378 CARCINOEMBRYONIC ANTIGEN: CPT

## 2024-08-13 PROCEDURE — 80061 LIPID PANEL: CPT

## 2024-08-13 PROCEDURE — 82043 UR ALBUMIN QUANTITATIVE: CPT

## 2024-08-13 PROCEDURE — 82746 ASSAY OF FOLIC ACID SERUM: CPT

## 2024-08-13 PROCEDURE — 80050 GENERAL HEALTH PANEL: CPT

## 2024-08-13 PROCEDURE — 83540 ASSAY OF IRON: CPT

## 2024-08-13 PROCEDURE — 82607 VITAMIN B-12: CPT

## 2024-08-13 PROCEDURE — 84550 ASSAY OF BLOOD/URIC ACID: CPT

## 2024-08-13 PROCEDURE — 83036 HEMOGLOBIN GLYCOSYLATED A1C: CPT

## 2024-09-03 ENCOUNTER — LAB (OUTPATIENT)
Dept: LAB | Facility: HOSPITAL | Age: 54
End: 2024-09-03
Payer: COMMERCIAL

## 2024-09-03 ENCOUNTER — TRANSCRIBE ORDERS (OUTPATIENT)
Dept: LAB | Facility: HOSPITAL | Age: 54
End: 2024-09-03
Payer: COMMERCIAL

## 2024-09-03 DIAGNOSIS — C18.6 MALIGNANT NEOPLASM OF DESCENDING COLON: Primary | ICD-10-CM

## 2024-09-03 DIAGNOSIS — C18.6 MALIGNANT NEOPLASM OF DESCENDING COLON: ICD-10-CM

## 2024-09-03 LAB
ALBUMIN SERPL-MCNC: 3.7 G/DL (ref 3.5–5.2)
ALBUMIN/GLOB SERPL: 1.2 G/DL
ALP SERPL-CCNC: 169 U/L (ref 39–117)
ALT SERPL W P-5'-P-CCNC: 55 U/L (ref 1–41)
ANION GAP SERPL CALCULATED.3IONS-SCNC: 12.2 MMOL/L (ref 5–15)
AST SERPL-CCNC: 61 U/L (ref 1–40)
BASOPHILS # BLD AUTO: 0.03 10*3/MM3 (ref 0–0.2)
BASOPHILS NFR BLD AUTO: 0.5 % (ref 0–1.5)
BILIRUB SERPL-MCNC: 0.4 MG/DL (ref 0–1.2)
BILIRUB UR QL STRIP: NEGATIVE
BUN SERPL-MCNC: 20 MG/DL (ref 6–20)
BUN/CREAT SERPL: 17.5 (ref 7–25)
CALCIUM SPEC-SCNC: 9.3 MG/DL (ref 8.6–10.5)
CEA SERPL-MCNC: 7.31 NG/ML
CHLORIDE SERPL-SCNC: 102 MMOL/L (ref 98–107)
CLARITY UR: CLEAR
CO2 SERPL-SCNC: 23.8 MMOL/L (ref 22–29)
COLOR UR: YELLOW
CREAT SERPL-MCNC: 1.14 MG/DL (ref 0.76–1.27)
DEPRECATED RDW RBC AUTO: 61.3 FL (ref 37–54)
EGFRCR SERPLBLD CKD-EPI 2021: 76.4 ML/MIN/1.73
EOSINOPHIL # BLD AUTO: 0.16 10*3/MM3 (ref 0–0.4)
EOSINOPHIL NFR BLD AUTO: 2.6 % (ref 0.3–6.2)
ERYTHROCYTE [DISTWIDTH] IN BLOOD BY AUTOMATED COUNT: 18.9 % (ref 12.3–15.4)
GLOBULIN UR ELPH-MCNC: 3.2 GM/DL
GLUCOSE SERPL-MCNC: 113 MG/DL (ref 65–99)
GLUCOSE UR STRIP-MCNC: NEGATIVE MG/DL
HCT VFR BLD AUTO: 37 % (ref 37.5–51)
HGB BLD-MCNC: 11.9 G/DL (ref 13–17.7)
HGB UR QL STRIP.AUTO: NEGATIVE
IMM GRANULOCYTES # BLD AUTO: 0.06 10*3/MM3 (ref 0–0.05)
IMM GRANULOCYTES NFR BLD AUTO: 1 % (ref 0–0.5)
KETONES UR QL STRIP: NEGATIVE
LEUKOCYTE ESTERASE UR QL STRIP.AUTO: ABNORMAL
LYMPHOCYTES # BLD AUTO: 1.64 10*3/MM3 (ref 0.7–3.1)
LYMPHOCYTES NFR BLD AUTO: 26.9 % (ref 19.6–45.3)
MCH RBC QN AUTO: 28.7 PG (ref 26.6–33)
MCHC RBC AUTO-ENTMCNC: 32.2 G/DL (ref 31.5–35.7)
MCV RBC AUTO: 89.2 FL (ref 79–97)
MONOCYTES # BLD AUTO: 0.67 10*3/MM3 (ref 0.1–0.9)
MONOCYTES NFR BLD AUTO: 11 % (ref 5–12)
NEUTROPHILS NFR BLD AUTO: 3.54 10*3/MM3 (ref 1.7–7)
NEUTROPHILS NFR BLD AUTO: 58 % (ref 42.7–76)
NITRITE UR QL STRIP: NEGATIVE
NRBC BLD AUTO-RTO: 0 /100 WBC (ref 0–0.2)
PH UR STRIP.AUTO: 6 [PH] (ref 5–8)
PLATELET # BLD AUTO: 147 10*3/MM3 (ref 140–450)
PMV BLD AUTO: 10.4 FL (ref 6–12)
POTASSIUM SERPL-SCNC: 4.9 MMOL/L (ref 3.5–5.2)
PROT SERPL-MCNC: 6.9 G/DL (ref 6–8.5)
PROT UR QL STRIP: NEGATIVE
RBC # BLD AUTO: 4.15 10*6/MM3 (ref 4.14–5.8)
SODIUM SERPL-SCNC: 138 MMOL/L (ref 136–145)
SP GR UR STRIP: 1.02 (ref 1–1.03)
UROBILINOGEN UR QL STRIP: ABNORMAL
WBC NRBC COR # BLD AUTO: 6.1 10*3/MM3 (ref 3.4–10.8)

## 2024-09-03 PROCEDURE — 81003 URINALYSIS AUTO W/O SCOPE: CPT

## 2024-09-03 PROCEDURE — 36415 COLL VENOUS BLD VENIPUNCTURE: CPT

## 2024-09-03 PROCEDURE — 80053 COMPREHEN METABOLIC PANEL: CPT

## 2024-09-03 PROCEDURE — 85025 COMPLETE CBC W/AUTO DIFF WBC: CPT

## 2024-09-03 PROCEDURE — 82378 CARCINOEMBRYONIC ANTIGEN: CPT

## 2024-09-15 ENCOUNTER — APPOINTMENT (OUTPATIENT)
Dept: GENERAL RADIOLOGY | Facility: HOSPITAL | Age: 54
End: 2024-09-15
Payer: COMMERCIAL

## 2024-09-15 ENCOUNTER — APPOINTMENT (OUTPATIENT)
Dept: CT IMAGING | Facility: HOSPITAL | Age: 54
End: 2024-09-15
Payer: COMMERCIAL

## 2024-09-15 ENCOUNTER — APPOINTMENT (OUTPATIENT)
Dept: CARDIOLOGY | Facility: HOSPITAL | Age: 54
End: 2024-09-15
Payer: COMMERCIAL

## 2024-09-15 ENCOUNTER — HOSPITAL ENCOUNTER (OUTPATIENT)
Facility: HOSPITAL | Age: 54
Setting detail: OBSERVATION
Discharge: HOME | End: 2024-09-16
Attending: EMERGENCY MEDICINE | Admitting: INTERNAL MEDICINE
Payer: COMMERCIAL

## 2024-09-15 DIAGNOSIS — R79.89 ELEVATED TROPONIN: ICD-10-CM

## 2024-09-15 DIAGNOSIS — N17.9 AKI (ACUTE KIDNEY INJURY): Primary | ICD-10-CM

## 2024-09-15 DIAGNOSIS — I95.1 ORTHOSTATIC HYPOTENSION: ICD-10-CM

## 2024-09-15 PROBLEM — C18.9 METASTATIC COLON CANCER TO LIVER: Status: ACTIVE | Noted: 2024-09-15

## 2024-09-15 PROBLEM — C78.7 METASTATIC COLON CANCER TO LIVER: Status: ACTIVE | Noted: 2024-09-15

## 2024-09-15 PROBLEM — D61.810 PANCYTOPENIA DUE TO CHEMOTHERAPY: Status: ACTIVE | Noted: 2024-09-15

## 2024-09-15 PROBLEM — R19.7 DIARRHEA: Status: ACTIVE | Noted: 2024-09-15

## 2024-09-15 LAB
ADV 40+41 DNA STL QL NAA+NON-PROBE: NOT DETECTED
ALBUMIN SERPL-MCNC: 3.6 G/DL (ref 3.5–5.2)
ALBUMIN/GLOB SERPL: 1.4 G/DL
ALP SERPL-CCNC: 117 U/L (ref 39–117)
ALT SERPL W P-5'-P-CCNC: 64 U/L (ref 1–41)
ANION GAP SERPL CALCULATED.3IONS-SCNC: 12.2 MMOL/L (ref 5–15)
AST SERPL-CCNC: 69 U/L (ref 1–40)
ASTRO TYP 1-8 RNA STL QL NAA+NON-PROBE: NOT DETECTED
BACTERIA UR QL AUTO: NORMAL /HPF
BASOPHILS # BLD AUTO: 0 10*3/MM3 (ref 0–0.2)
BASOPHILS NFR BLD AUTO: 0 % (ref 0–1.5)
BILIRUB SERPL-MCNC: 1.1 MG/DL (ref 0–1.2)
BILIRUB UR QL STRIP: NEGATIVE
BUN SERPL-MCNC: 16 MG/DL (ref 6–20)
BUN/CREAT SERPL: 12.4 (ref 7–25)
C CAYETANENSIS DNA STL QL NAA+NON-PROBE: NOT DETECTED
C COLI+JEJ+UPSA DNA STL QL NAA+NON-PROBE: DETECTED
C DIFF GDH + TOXINS A+B STL QL IA.RAPID: NEGATIVE
C DIFF GDH + TOXINS A+B STL QL IA.RAPID: NEGATIVE
CALCIUM SPEC-SCNC: 8.8 MG/DL (ref 8.6–10.5)
CHLORIDE SERPL-SCNC: 99 MMOL/L (ref 98–107)
CLARITY UR: CLEAR
CO2 SERPL-SCNC: 20.8 MMOL/L (ref 22–29)
COLOR UR: YELLOW
CREAT SERPL-MCNC: 1.29 MG/DL (ref 0.76–1.27)
CRYPTOSP DNA STL QL NAA+NON-PROBE: NOT DETECTED
D-LACTATE SERPL-SCNC: 1.6 MMOL/L (ref 0.5–2)
D-LACTATE SERPL-SCNC: 2.1 MMOL/L (ref 0.5–2)
DEPRECATED RDW RBC AUTO: 60 FL (ref 37–54)
E HISTOLYT DNA STL QL NAA+NON-PROBE: NOT DETECTED
EAEC PAA PLAS AGGR+AATA ST NAA+NON-PRB: NOT DETECTED
EC STX1+STX2 GENES STL QL NAA+NON-PROBE: NOT DETECTED
EGFRCR SERPLBLD CKD-EPI 2021: 65.9 ML/MIN/1.73
EOSINOPHIL # BLD AUTO: 0.01 10*3/MM3 (ref 0–0.4)
EOSINOPHIL NFR BLD AUTO: 0.3 % (ref 0.3–6.2)
EPEC EAE GENE STL QL NAA+NON-PROBE: NOT DETECTED
ERYTHROCYTE [DISTWIDTH] IN BLOOD BY AUTOMATED COUNT: 19.2 % (ref 12.3–15.4)
ETEC LTA+ST1A+ST1B TOX ST NAA+NON-PROBE: NOT DETECTED
G LAMBLIA DNA STL QL NAA+NON-PROBE: NOT DETECTED
GLOBULIN UR ELPH-MCNC: 2.5 GM/DL
GLUCOSE BLDC GLUCOMTR-MCNC: 74 MG/DL (ref 70–130)
GLUCOSE BLDC GLUCOMTR-MCNC: 82 MG/DL (ref 70–130)
GLUCOSE SERPL-MCNC: 155 MG/DL (ref 65–99)
GLUCOSE UR STRIP-MCNC: NEGATIVE MG/DL
HCT VFR BLD AUTO: 34 % (ref 37.5–51)
HGB BLD-MCNC: 11.1 G/DL (ref 13–17.7)
HGB UR QL STRIP.AUTO: NEGATIVE
HOLD SPECIMEN: NORMAL
HOLD SPECIMEN: NORMAL
HYALINE CASTS UR QL AUTO: NORMAL /LPF
IMM GRANULOCYTES # BLD AUTO: 0.01 10*3/MM3 (ref 0–0.05)
IMM GRANULOCYTES NFR BLD AUTO: 0.3 % (ref 0–0.5)
IRON 24H UR-MRATE: 29 MCG/DL (ref 59–158)
IRON SATN MFR SERPL: 9 % (ref 20–50)
KETONES UR QL STRIP: NEGATIVE
LEUKOCYTE ESTERASE UR QL STRIP.AUTO: ABNORMAL
LYMPHOCYTES # BLD AUTO: 0.54 10*3/MM3 (ref 0.7–3.1)
LYMPHOCYTES NFR BLD AUTO: 17.6 % (ref 19.6–45.3)
MAGNESIUM SERPL-MCNC: 1.6 MG/DL (ref 1.6–2.6)
MCH RBC QN AUTO: 28.7 PG (ref 26.6–33)
MCHC RBC AUTO-ENTMCNC: 32.6 G/DL (ref 31.5–35.7)
MCV RBC AUTO: 87.9 FL (ref 79–97)
MONOCYTES # BLD AUTO: 0.61 10*3/MM3 (ref 0.1–0.9)
MONOCYTES NFR BLD AUTO: 19.9 % (ref 5–12)
NEUTROPHILS NFR BLD AUTO: 1.9 10*3/MM3 (ref 1.7–7)
NEUTROPHILS NFR BLD AUTO: 61.9 % (ref 42.7–76)
NITRITE UR QL STRIP: NEGATIVE
NOROVIRUS GI+II RNA STL QL NAA+NON-PROBE: NOT DETECTED
NRBC BLD AUTO-RTO: 0 /100 WBC (ref 0–0.2)
P SHIGELLOIDES DNA STL QL NAA+NON-PROBE: NOT DETECTED
PH UR STRIP.AUTO: 6 [PH] (ref 5–8)
PLATELET # BLD AUTO: 102 10*3/MM3 (ref 140–450)
PMV BLD AUTO: 10 FL (ref 6–12)
POTASSIUM SERPL-SCNC: 4.1 MMOL/L (ref 3.5–5.2)
PROT SERPL-MCNC: 6.1 G/DL (ref 6–8.5)
PROT UR QL STRIP: NEGATIVE
RBC # BLD AUTO: 3.87 10*6/MM3 (ref 4.14–5.8)
RBC # UR STRIP: NORMAL /HPF
REF LAB TEST METHOD: NORMAL
RVA RNA STL QL NAA+NON-PROBE: NOT DETECTED
S ENT+BONG DNA STL QL NAA+NON-PROBE: NOT DETECTED
SAPO I+II+IV+V RNA STL QL NAA+NON-PROBE: NOT DETECTED
SHIGELLA SP+EIEC IPAH ST NAA+NON-PROBE: NOT DETECTED
SODIUM SERPL-SCNC: 132 MMOL/L (ref 136–145)
SP GR UR STRIP: 1.01 (ref 1–1.03)
SQUAMOUS #/AREA URNS HPF: NORMAL /HPF
TIBC SERPL-MCNC: 332 MCG/DL (ref 298–536)
TRANSFERRIN SERPL-MCNC: 223 MG/DL (ref 200–360)
TROPONIN T SERPL HS-MCNC: 25 NG/L
TROPONIN T SERPL HS-MCNC: 39 NG/L
UROBILINOGEN UR QL STRIP: ABNORMAL
V CHOL+PARA+VUL DNA STL QL NAA+NON-PROBE: NOT DETECTED
V CHOLERAE DNA STL QL NAA+NON-PROBE: NOT DETECTED
WBC # UR STRIP: NORMAL /HPF
WBC NRBC COR # BLD AUTO: 3.07 10*3/MM3 (ref 3.4–10.8)
WHOLE BLOOD HOLD COAG: NORMAL
WHOLE BLOOD HOLD SPECIMEN: NORMAL
Y ENTEROCOL DNA STL QL NAA+NON-PROBE: NOT DETECTED

## 2024-09-15 PROCEDURE — 70450 CT HEAD/BRAIN W/O DYE: CPT

## 2024-09-15 PROCEDURE — 25810000003 SODIUM CHLORIDE 0.9 % SOLUTION: Performed by: EMERGENCY MEDICINE

## 2024-09-15 PROCEDURE — 36415 COLL VENOUS BLD VENIPUNCTURE: CPT

## 2024-09-15 PROCEDURE — 99223 1ST HOSP IP/OBS HIGH 75: CPT | Performed by: INTERNAL MEDICINE

## 2024-09-15 PROCEDURE — 25010000002 SULFUR HEXAFLUORIDE MICROSPH 60.7-25 MG RECONSTITUTED SUSPENSION: Performed by: EMERGENCY MEDICINE

## 2024-09-15 PROCEDURE — 25810000003 LACTATED RINGERS PER 1000 ML: Performed by: INTERNAL MEDICINE

## 2024-09-15 PROCEDURE — 87449 NOS EACH ORGANISM AG IA: CPT | Performed by: EMERGENCY MEDICINE

## 2024-09-15 PROCEDURE — 84466 ASSAY OF TRANSFERRIN: CPT | Performed by: INTERNAL MEDICINE

## 2024-09-15 PROCEDURE — 25010000002 METOCLOPRAMIDE PER 10 MG: Performed by: EMERGENCY MEDICINE

## 2024-09-15 PROCEDURE — 71045 X-RAY EXAM CHEST 1 VIEW: CPT

## 2024-09-15 PROCEDURE — 80053 COMPREHEN METABOLIC PANEL: CPT | Performed by: EMERGENCY MEDICINE

## 2024-09-15 PROCEDURE — 84484 ASSAY OF TROPONIN QUANT: CPT | Performed by: EMERGENCY MEDICINE

## 2024-09-15 PROCEDURE — 96375 TX/PRO/DX INJ NEW DRUG ADDON: CPT

## 2024-09-15 PROCEDURE — 81001 URINALYSIS AUTO W/SCOPE: CPT | Performed by: EMERGENCY MEDICINE

## 2024-09-15 PROCEDURE — G0378 HOSPITAL OBSERVATION PER HR: HCPCS

## 2024-09-15 PROCEDURE — 83735 ASSAY OF MAGNESIUM: CPT | Performed by: EMERGENCY MEDICINE

## 2024-09-15 PROCEDURE — 87040 BLOOD CULTURE FOR BACTERIA: CPT | Performed by: INTERNAL MEDICINE

## 2024-09-15 PROCEDURE — 82948 REAGENT STRIP/BLOOD GLUCOSE: CPT

## 2024-09-15 PROCEDURE — 96361 HYDRATE IV INFUSION ADD-ON: CPT

## 2024-09-15 PROCEDURE — 93005 ELECTROCARDIOGRAM TRACING: CPT | Performed by: EMERGENCY MEDICINE

## 2024-09-15 PROCEDURE — 93306 TTE W/DOPPLER COMPLETE: CPT

## 2024-09-15 PROCEDURE — 74176 CT ABD & PELVIS W/O CONTRAST: CPT

## 2024-09-15 PROCEDURE — 83540 ASSAY OF IRON: CPT | Performed by: INTERNAL MEDICINE

## 2024-09-15 PROCEDURE — 83605 ASSAY OF LACTIC ACID: CPT | Performed by: EMERGENCY MEDICINE

## 2024-09-15 PROCEDURE — 25010000002 METRONIDAZOLE 500 MG/100ML SOLUTION: Performed by: INTERNAL MEDICINE

## 2024-09-15 PROCEDURE — 99285 EMERGENCY DEPT VISIT HI MDM: CPT

## 2024-09-15 PROCEDURE — 85025 COMPLETE CBC W/AUTO DIFF WBC: CPT | Performed by: EMERGENCY MEDICINE

## 2024-09-15 PROCEDURE — 87324 CLOSTRIDIUM AG IA: CPT | Performed by: EMERGENCY MEDICINE

## 2024-09-15 PROCEDURE — 87507 IADNA-DNA/RNA PROBE TQ 12-25: CPT | Performed by: INTERNAL MEDICINE

## 2024-09-15 PROCEDURE — 96365 THER/PROPH/DIAG IV INF INIT: CPT

## 2024-09-15 PROCEDURE — 93306 TTE W/DOPPLER COMPLETE: CPT | Performed by: INTERNAL MEDICINE

## 2024-09-15 RX ORDER — SODIUM CHLORIDE 0.9 % (FLUSH) 0.9 %
10 SYRINGE (ML) INJECTION AS NEEDED
Status: DISCONTINUED | OUTPATIENT
Start: 2024-09-15 | End: 2024-09-16 | Stop reason: HOSPADM

## 2024-09-15 RX ORDER — FLUTICASONE PROPIONATE 50 MCG
2 SPRAY, SUSPENSION (ML) NASAL DAILY
Status: DISCONTINUED | OUTPATIENT
Start: 2024-09-16 | End: 2024-09-16 | Stop reason: HOSPADM

## 2024-09-15 RX ORDER — ESCITALOPRAM OXALATE 10 MG/1
10 TABLET ORAL DAILY
Status: DISCONTINUED | OUTPATIENT
Start: 2024-09-16 | End: 2024-09-16 | Stop reason: HOSPADM

## 2024-09-15 RX ORDER — AMOXICILLIN 250 MG
2 CAPSULE ORAL 2 TIMES DAILY PRN
Status: DISCONTINUED | OUTPATIENT
Start: 2024-09-15 | End: 2024-09-16 | Stop reason: HOSPADM

## 2024-09-15 RX ORDER — NICOTINE POLACRILEX 4 MG
15 LOZENGE BUCCAL
Status: DISCONTINUED | OUTPATIENT
Start: 2024-09-15 | End: 2024-09-16 | Stop reason: HOSPADM

## 2024-09-15 RX ORDER — INSULIN LISPRO 100 [IU]/ML
2-9 INJECTION, SOLUTION INTRAVENOUS; SUBCUTANEOUS
Status: DISCONTINUED | OUTPATIENT
Start: 2024-09-15 | End: 2024-09-16 | Stop reason: HOSPADM

## 2024-09-15 RX ORDER — TAMSULOSIN HYDROCHLORIDE 0.4 MG/1
0.8 CAPSULE ORAL DAILY
Status: DISCONTINUED | OUTPATIENT
Start: 2024-09-16 | End: 2024-09-16 | Stop reason: HOSPADM

## 2024-09-15 RX ORDER — BISACODYL 5 MG/1
5 TABLET, DELAYED RELEASE ORAL DAILY PRN
Status: DISCONTINUED | OUTPATIENT
Start: 2024-09-15 | End: 2024-09-16 | Stop reason: HOSPADM

## 2024-09-15 RX ORDER — CALCIUM CARBONATE 500 MG/1
2 TABLET, CHEWABLE ORAL 2 TIMES DAILY PRN
Status: DISCONTINUED | OUTPATIENT
Start: 2024-09-15 | End: 2024-09-16 | Stop reason: HOSPADM

## 2024-09-15 RX ORDER — SODIUM CHLORIDE 0.9 % (FLUSH) 0.9 %
10 SYRINGE (ML) INJECTION EVERY 12 HOURS SCHEDULED
Status: DISCONTINUED | OUTPATIENT
Start: 2024-09-15 | End: 2024-09-16 | Stop reason: HOSPADM

## 2024-09-15 RX ORDER — SODIUM CHLORIDE, SODIUM LACTATE, POTASSIUM CHLORIDE, CALCIUM CHLORIDE 600; 310; 30; 20 MG/100ML; MG/100ML; MG/100ML; MG/100ML
100 INJECTION, SOLUTION INTRAVENOUS CONTINUOUS
Status: DISCONTINUED | OUTPATIENT
Start: 2024-09-15 | End: 2024-09-16 | Stop reason: HOSPADM

## 2024-09-15 RX ORDER — ONDANSETRON 4 MG/1
4 TABLET, ORALLY DISINTEGRATING ORAL EVERY 6 HOURS PRN
Status: DISCONTINUED | OUTPATIENT
Start: 2024-09-15 | End: 2024-09-16 | Stop reason: HOSPADM

## 2024-09-15 RX ORDER — FERROUS SULFATE 324(65)MG
324 TABLET, DELAYED RELEASE (ENTERIC COATED) ORAL EVERY OTHER DAY
Status: DISCONTINUED | OUTPATIENT
Start: 2024-09-15 | End: 2024-09-16 | Stop reason: HOSPADM

## 2024-09-15 RX ORDER — HYDROCORTISONE 25 MG/G
CREAM TOPICAL 2 TIMES DAILY PRN
Status: DISCONTINUED | OUTPATIENT
Start: 2024-09-15 | End: 2024-09-16 | Stop reason: HOSPADM

## 2024-09-15 RX ORDER — METOCLOPRAMIDE HYDROCHLORIDE 5 MG/ML
10 INJECTION INTRAMUSCULAR; INTRAVENOUS ONCE
Status: COMPLETED | OUTPATIENT
Start: 2024-09-15 | End: 2024-09-15

## 2024-09-15 RX ORDER — NITROGLYCERIN 0.4 MG/1
0.4 TABLET SUBLINGUAL
Status: DISCONTINUED | OUTPATIENT
Start: 2024-09-15 | End: 2024-09-16 | Stop reason: HOSPADM

## 2024-09-15 RX ORDER — DEXTROSE MONOHYDRATE 25 G/50ML
25 INJECTION, SOLUTION INTRAVENOUS
Status: DISCONTINUED | OUTPATIENT
Start: 2024-09-15 | End: 2024-09-16 | Stop reason: HOSPADM

## 2024-09-15 RX ORDER — METRONIDAZOLE 500 MG/100ML
500 INJECTION, SOLUTION INTRAVENOUS EVERY 8 HOURS
Status: DISCONTINUED | OUTPATIENT
Start: 2024-09-15 | End: 2024-09-16

## 2024-09-15 RX ORDER — ONDANSETRON 4 MG/1
4 TABLET, ORALLY DISINTEGRATING ORAL EVERY 8 HOURS PRN
Status: DISCONTINUED | OUTPATIENT
Start: 2024-09-15 | End: 2024-09-16 | Stop reason: HOSPADM

## 2024-09-15 RX ORDER — PANTOPRAZOLE SODIUM 40 MG/1
40 TABLET, DELAYED RELEASE ORAL DAILY
Status: DISCONTINUED | OUTPATIENT
Start: 2024-09-16 | End: 2024-09-16 | Stop reason: HOSPADM

## 2024-09-15 RX ORDER — SODIUM CHLORIDE 9 MG/ML
40 INJECTION, SOLUTION INTRAVENOUS AS NEEDED
Status: DISCONTINUED | OUTPATIENT
Start: 2024-09-15 | End: 2024-09-16 | Stop reason: HOSPADM

## 2024-09-15 RX ORDER — ACETAMINOPHEN 325 MG/1
650 TABLET ORAL EVERY 4 HOURS PRN
Status: DISCONTINUED | OUTPATIENT
Start: 2024-09-15 | End: 2024-09-16 | Stop reason: HOSPADM

## 2024-09-15 RX ORDER — POLYETHYLENE GLYCOL 3350 17 G/17G
17 POWDER, FOR SOLUTION ORAL DAILY PRN
Status: DISCONTINUED | OUTPATIENT
Start: 2024-09-15 | End: 2024-09-16 | Stop reason: HOSPADM

## 2024-09-15 RX ORDER — BUPROPION HYDROCHLORIDE 150 MG/1
300 TABLET ORAL DAILY
Status: DISCONTINUED | OUTPATIENT
Start: 2024-09-16 | End: 2024-09-16 | Stop reason: HOSPADM

## 2024-09-15 RX ORDER — MECLIZINE HYDROCHLORIDE 25 MG/1
25 TABLET ORAL ONCE
Status: COMPLETED | OUTPATIENT
Start: 2024-09-15 | End: 2024-09-15

## 2024-09-15 RX ORDER — LEVOTHYROXINE SODIUM 50 UG/1
50 TABLET ORAL EVERY MORNING
Status: DISCONTINUED | OUTPATIENT
Start: 2024-09-16 | End: 2024-09-16 | Stop reason: HOSPADM

## 2024-09-15 RX ORDER — BISACODYL 10 MG
10 SUPPOSITORY, RECTAL RECTAL DAILY PRN
Status: DISCONTINUED | OUTPATIENT
Start: 2024-09-15 | End: 2024-09-16 | Stop reason: HOSPADM

## 2024-09-15 RX ORDER — ONDANSETRON 2 MG/ML
4 INJECTION INTRAMUSCULAR; INTRAVENOUS EVERY 6 HOURS PRN
Status: DISCONTINUED | OUTPATIENT
Start: 2024-09-15 | End: 2024-09-16 | Stop reason: HOSPADM

## 2024-09-15 RX ORDER — ALLOPURINOL 100 MG/1
300 TABLET ORAL DAILY
Status: DISCONTINUED | OUTPATIENT
Start: 2024-09-16 | End: 2024-09-16 | Stop reason: HOSPADM

## 2024-09-15 RX ADMIN — FERROUS SULFATE TAB EC 324 MG (65 MG FE EQUIVALENT) 324 MG: 324 (65 FE) TABLET DELAYED RESPONSE at 21:59

## 2024-09-15 RX ADMIN — METRONIDAZOLE 500 MG: 500 INJECTION, SOLUTION INTRAVENOUS at 19:08

## 2024-09-15 RX ADMIN — SODIUM CHLORIDE 500 ML: 9 INJECTION, SOLUTION INTRAVENOUS at 15:21

## 2024-09-15 RX ADMIN — SODIUM CHLORIDE, POTASSIUM CHLORIDE, SODIUM LACTATE AND CALCIUM CHLORIDE 100 ML/HR: 600; 310; 30; 20 INJECTION, SOLUTION INTRAVENOUS at 19:08

## 2024-09-15 RX ADMIN — APIXABAN 5 MG: 5 TABLET, FILM COATED ORAL at 21:59

## 2024-09-15 RX ADMIN — MECLIZINE HYDROCHLORIDE 25 MG: 25 TABLET ORAL at 14:22

## 2024-09-15 RX ADMIN — SODIUM CHLORIDE 1000 ML: 9 INJECTION, SOLUTION INTRAVENOUS at 14:22

## 2024-09-15 RX ADMIN — METOCLOPRAMIDE 10 MG: 5 INJECTION, SOLUTION INTRAMUSCULAR; INTRAVENOUS at 14:22

## 2024-09-15 RX ADMIN — Medication 10 ML: at 21:59

## 2024-09-15 RX ADMIN — SULFUR HEXAFLUORIDE 3 ML: KIT at 18:56

## 2024-09-15 RX ADMIN — Medication 5 MG: at 21:59

## 2024-09-16 ENCOUNTER — READMISSION MANAGEMENT (OUTPATIENT)
Dept: CALL CENTER | Facility: HOSPITAL | Age: 54
End: 2024-09-16
Payer: COMMERCIAL

## 2024-09-16 VITALS
HEIGHT: 69 IN | SYSTOLIC BLOOD PRESSURE: 110 MMHG | WEIGHT: 279.98 LBS | OXYGEN SATURATION: 97 % | RESPIRATION RATE: 16 BRPM | BODY MASS INDEX: 41.47 KG/M2 | HEART RATE: 76 BPM | DIASTOLIC BLOOD PRESSURE: 90 MMHG | TEMPERATURE: 97.9 F

## 2024-09-16 LAB
ALBUMIN SERPL-MCNC: 3 G/DL (ref 3.5–5.2)
ALBUMIN/GLOB SERPL: 1.3 G/DL
ALP SERPL-CCNC: 90 U/L (ref 39–117)
ALT SERPL W P-5'-P-CCNC: 55 U/L (ref 1–41)
ANION GAP SERPL CALCULATED.3IONS-SCNC: 9.3 MMOL/L (ref 5–15)
AST SERPL-CCNC: 61 U/L (ref 1–40)
BH CV ECHO MEAS - AO MAX PG: 9.6 MMHG
BH CV ECHO MEAS - AO MEAN PG: 6 MMHG
BH CV ECHO MEAS - AO ROOT DIAM: 3.2 CM
BH CV ECHO MEAS - AO V2 MAX: 155 CM/SEC
BH CV ECHO MEAS - AO V2 VTI: 29.6 CM
BH CV ECHO MEAS - AVA(I,D): 2.21 CM2
BH CV ECHO MEAS - EDV(CUBED): 139.8 ML
BH CV ECHO MEAS - EDV(MOD-SP2): 138 ML
BH CV ECHO MEAS - EDV(MOD-SP4): 144 ML
BH CV ECHO MEAS - EF(MOD-BP): 65.1 %
BH CV ECHO MEAS - EF(MOD-SP2): 60 %
BH CV ECHO MEAS - EF(MOD-SP4): 69.1 %
BH CV ECHO MEAS - ESV(CUBED): 27.3 ML
BH CV ECHO MEAS - ESV(MOD-SP2): 55.2 ML
BH CV ECHO MEAS - ESV(MOD-SP4): 44.5 ML
BH CV ECHO MEAS - FS: 42 %
BH CV ECHO MEAS - IVS/LVPW: 0.7 CM
BH CV ECHO MEAS - IVSD: 0.81 CM
BH CV ECHO MEAS - LA DIMENSION: 4.2 CM
BH CV ECHO MEAS - LAT PEAK E' VEL: 16.5 CM/SEC
BH CV ECHO MEAS - LV DIASTOLIC VOL/BSA (35-75): 60.4 CM2
BH CV ECHO MEAS - LV MASS(C)D: 189.7 GRAMS
BH CV ECHO MEAS - LV MAX PG: 4.5 MMHG
BH CV ECHO MEAS - LV MEAN PG: 2 MMHG
BH CV ECHO MEAS - LV SYSTOLIC VOL/BSA (12-30): 18.7 CM2
BH CV ECHO MEAS - LV V1 MAX: 106 CM/SEC
BH CV ECHO MEAS - LV V1 VTI: 20.4 CM
BH CV ECHO MEAS - LVIDD: 5.2 CM
BH CV ECHO MEAS - LVIDS: 3 CM
BH CV ECHO MEAS - LVOT AREA: 3.2 CM2
BH CV ECHO MEAS - LVOT DIAM: 2.02 CM
BH CV ECHO MEAS - LVPWD: 1.16 CM
BH CV ECHO MEAS - MED PEAK E' VEL: 12.9 CM/SEC
BH CV ECHO MEAS - MV A MAX VEL: 71.5 CM/SEC
BH CV ECHO MEAS - MV DEC SLOPE: 642 CM/SEC2
BH CV ECHO MEAS - MV DEC TIME: 0.18 SEC
BH CV ECHO MEAS - MV E MAX VEL: 113 CM/SEC
BH CV ECHO MEAS - MV E/A: 1.58
BH CV ECHO MEAS - MV MAX PG: 5 MMHG
BH CV ECHO MEAS - MV MEAN PG: 2 MMHG
BH CV ECHO MEAS - MV V2 VTI: 25.4 CM
BH CV ECHO MEAS - MVA(VTI): 2.6 CM2
BH CV ECHO MEAS - PA ACC TIME: 0.15 SEC
BH CV ECHO MEAS - PA V2 MAX: 176 CM/SEC
BH CV ECHO MEAS - RAP SYSTOLE: 3 MMHG
BH CV ECHO MEAS - RV MAX PG: 3.7 MMHG
BH CV ECHO MEAS - RV V1 MAX: 96.5 CM/SEC
BH CV ECHO MEAS - RV V1 VTI: 17.7 CM
BH CV ECHO MEAS - SV(LVOT): 65.4 ML
BH CV ECHO MEAS - SV(MOD-SP2): 82.8 ML
BH CV ECHO MEAS - SV(MOD-SP4): 99.5 ML
BH CV ECHO MEAS - SVI(LVOT): 27.4 ML/M2
BH CV ECHO MEAS - SVI(MOD-SP2): 34.7 ML/M2
BH CV ECHO MEAS - SVI(MOD-SP4): 41.8 ML/M2
BH CV ECHO MEAS - TAPSE (>1.6): 3.2 CM
BH CV ECHO MEASUREMENTS AVERAGE E/E' RATIO: 7.69
BH CV XLRA - TDI S': 18.7 CM/SEC
BILIRUB SERPL-MCNC: 0.6 MG/DL (ref 0–1.2)
BUN SERPL-MCNC: 15 MG/DL (ref 6–20)
BUN/CREAT SERPL: 13.5 (ref 7–25)
CALCIUM SPEC-SCNC: 8.3 MG/DL (ref 8.6–10.5)
CHLORIDE SERPL-SCNC: 104 MMOL/L (ref 98–107)
CO2 SERPL-SCNC: 22.7 MMOL/L (ref 22–29)
CREAT SERPL-MCNC: 1.11 MG/DL (ref 0.76–1.27)
DEPRECATED RDW RBC AUTO: 61.7 FL (ref 37–54)
EGFRCR SERPLBLD CKD-EPI 2021: 78.9 ML/MIN/1.73
ERYTHROCYTE [DISTWIDTH] IN BLOOD BY AUTOMATED COUNT: 19.4 % (ref 12.3–15.4)
GLOBULIN UR ELPH-MCNC: 2.3 GM/DL
GLUCOSE BLDC GLUCOMTR-MCNC: 122 MG/DL (ref 70–130)
GLUCOSE SERPL-MCNC: 126 MG/DL (ref 65–99)
HCT VFR BLD AUTO: 28.6 % (ref 37.5–51)
HGB BLD-MCNC: 9.1 G/DL (ref 13–17.7)
LEFT ATRIUM VOLUME INDEX: 18.6 ML/M2
MCH RBC QN AUTO: 28.2 PG (ref 26.6–33)
MCHC RBC AUTO-ENTMCNC: 31.8 G/DL (ref 31.5–35.7)
MCV RBC AUTO: 88.5 FL (ref 79–97)
PLATELET # BLD AUTO: 79 10*3/MM3 (ref 140–450)
PMV BLD AUTO: 10.5 FL (ref 6–12)
POTASSIUM SERPL-SCNC: 4.1 MMOL/L (ref 3.5–5.2)
PROT SERPL-MCNC: 5.3 G/DL (ref 6–8.5)
RBC # BLD AUTO: 3.23 10*6/MM3 (ref 4.14–5.8)
SODIUM SERPL-SCNC: 136 MMOL/L (ref 136–145)
WBC NRBC COR # BLD AUTO: 2.9 10*3/MM3 (ref 3.4–10.8)

## 2024-09-16 PROCEDURE — 82948 REAGENT STRIP/BLOOD GLUCOSE: CPT | Performed by: INTERNAL MEDICINE

## 2024-09-16 PROCEDURE — 96367 TX/PROPH/DG ADDL SEQ IV INF: CPT

## 2024-09-16 PROCEDURE — 96361 HYDRATE IV INFUSION ADD-ON: CPT

## 2024-09-16 PROCEDURE — 25010000002 AZITHROMYCIN PER 500 MG: Performed by: INTERNAL MEDICINE

## 2024-09-16 PROCEDURE — 85027 COMPLETE CBC AUTOMATED: CPT | Performed by: INTERNAL MEDICINE

## 2024-09-16 PROCEDURE — G0378 HOSPITAL OBSERVATION PER HR: HCPCS

## 2024-09-16 PROCEDURE — 99239 HOSP IP/OBS DSCHRG MGMT >30: CPT | Performed by: INTERNAL MEDICINE

## 2024-09-16 PROCEDURE — 80053 COMPREHEN METABOLIC PANEL: CPT | Performed by: INTERNAL MEDICINE

## 2024-09-16 PROCEDURE — 25810000003 SODIUM CHLORIDE 0.9 % SOLUTION 250 ML FLEX CONT: Performed by: INTERNAL MEDICINE

## 2024-09-16 PROCEDURE — 96365 THER/PROPH/DIAG IV INF INIT: CPT

## 2024-09-16 PROCEDURE — 25810000003 LACTATED RINGERS PER 1000 ML: Performed by: INTERNAL MEDICINE

## 2024-09-16 PROCEDURE — 25010000002 METRONIDAZOLE 500 MG/100ML SOLUTION: Performed by: INTERNAL MEDICINE

## 2024-09-16 RX ORDER — AZITHROMYCIN 250 MG/1
250 TABLET, FILM COATED ORAL DAILY
Qty: 4 TABLET | Refills: 0 | Status: SHIPPED | OUTPATIENT
Start: 2024-09-16

## 2024-09-16 RX ADMIN — SODIUM CHLORIDE, POTASSIUM CHLORIDE, SODIUM LACTATE AND CALCIUM CHLORIDE 100 ML/HR: 600; 310; 30; 20 INJECTION, SOLUTION INTRAVENOUS at 04:16

## 2024-09-16 RX ADMIN — AZITHROMYCIN DIHYDRATE 500 MG: 500 INJECTION, POWDER, LYOPHILIZED, FOR SOLUTION INTRAVENOUS at 08:21

## 2024-09-16 RX ADMIN — ALLOPURINOL 300 MG: 100 TABLET ORAL at 08:20

## 2024-09-16 RX ADMIN — APIXABAN 5 MG: 5 TABLET, FILM COATED ORAL at 08:20

## 2024-09-16 RX ADMIN — ESCITALOPRAM OXALATE 10 MG: 10 TABLET ORAL at 08:20

## 2024-09-16 RX ADMIN — LEVOTHYROXINE SODIUM 50 MCG: 50 TABLET ORAL at 06:09

## 2024-09-16 RX ADMIN — TAMSULOSIN HYDROCHLORIDE 0.8 MG: 0.4 CAPSULE ORAL at 08:20

## 2024-09-16 RX ADMIN — PANTOPRAZOLE SODIUM 40 MG: 40 TABLET, DELAYED RELEASE ORAL at 08:20

## 2024-09-16 RX ADMIN — BUPROPION HYDROCHLORIDE 300 MG: 150 TABLET, EXTENDED RELEASE ORAL at 08:20

## 2024-09-16 RX ADMIN — METRONIDAZOLE 500 MG: 500 INJECTION, SOLUTION INTRAVENOUS at 00:55

## 2024-09-17 ENCOUNTER — TRANSCRIBE ORDERS (OUTPATIENT)
Dept: LAB | Facility: HOSPITAL | Age: 54
End: 2024-09-17
Payer: COMMERCIAL

## 2024-09-17 ENCOUNTER — LAB (OUTPATIENT)
Dept: LAB | Facility: HOSPITAL | Age: 54
End: 2024-09-17
Payer: COMMERCIAL

## 2024-09-17 DIAGNOSIS — C18.6 MALIGNANT NEOPLASM OF DESCENDING COLON: ICD-10-CM

## 2024-09-17 DIAGNOSIS — C18.6 MALIGNANT NEOPLASM OF DESCENDING COLON: Primary | ICD-10-CM

## 2024-09-17 LAB
ALBUMIN SERPL-MCNC: 3.4 G/DL (ref 3.5–5.2)
ALBUMIN/GLOB SERPL: 1.4 G/DL
ALP SERPL-CCNC: 116 U/L (ref 39–117)
ALT SERPL W P-5'-P-CCNC: 54 U/L (ref 1–41)
ANION GAP SERPL CALCULATED.3IONS-SCNC: 11.1 MMOL/L (ref 5–15)
AST SERPL-CCNC: 60 U/L (ref 1–40)
BASOPHILS # BLD AUTO: 0.02 10*3/MM3 (ref 0–0.2)
BASOPHILS NFR BLD AUTO: 0.5 % (ref 0–1.5)
BILIRUB SERPL-MCNC: 0.5 MG/DL (ref 0–1.2)
BILIRUB UR QL STRIP: NEGATIVE
BUN SERPL-MCNC: 13 MG/DL (ref 6–20)
BUN/CREAT SERPL: 12.5 (ref 7–25)
CALCIUM SPEC-SCNC: 8.6 MG/DL (ref 8.6–10.5)
CEA SERPL-MCNC: 6.39 NG/ML
CHLORIDE SERPL-SCNC: 103 MMOL/L (ref 98–107)
CLARITY UR: CLEAR
CO2 SERPL-SCNC: 22.9 MMOL/L (ref 22–29)
COLOR UR: YELLOW
CREAT SERPL-MCNC: 1.04 MG/DL (ref 0.76–1.27)
DEPRECATED RDW RBC AUTO: 61.7 FL (ref 37–54)
EGFRCR SERPLBLD CKD-EPI 2021: 85.3 ML/MIN/1.73
EOSINOPHIL # BLD AUTO: 0.1 10*3/MM3 (ref 0–0.4)
EOSINOPHIL NFR BLD AUTO: 2.7 % (ref 0.3–6.2)
ERYTHROCYTE [DISTWIDTH] IN BLOOD BY AUTOMATED COUNT: 19.2 % (ref 12.3–15.4)
GLOBULIN UR ELPH-MCNC: 2.5 GM/DL
GLUCOSE SERPL-MCNC: 100 MG/DL (ref 65–99)
GLUCOSE UR STRIP-MCNC: NEGATIVE MG/DL
HCT VFR BLD AUTO: 32.6 % (ref 37.5–51)
HGB BLD-MCNC: 10.6 G/DL (ref 13–17.7)
HGB UR QL STRIP.AUTO: NEGATIVE
IMM GRANULOCYTES # BLD AUTO: 0.01 10*3/MM3 (ref 0–0.05)
IMM GRANULOCYTES NFR BLD AUTO: 0.3 % (ref 0–0.5)
KETONES UR QL STRIP: NEGATIVE
LEUKOCYTE ESTERASE UR QL STRIP.AUTO: ABNORMAL
LYMPHOCYTES # BLD AUTO: 1.25 10*3/MM3 (ref 0.7–3.1)
LYMPHOCYTES NFR BLD AUTO: 34.3 % (ref 19.6–45.3)
MCH RBC QN AUTO: 28.8 PG (ref 26.6–33)
MCHC RBC AUTO-ENTMCNC: 32.5 G/DL (ref 31.5–35.7)
MCV RBC AUTO: 88.6 FL (ref 79–97)
MONOCYTES # BLD AUTO: 0.62 10*3/MM3 (ref 0.1–0.9)
MONOCYTES NFR BLD AUTO: 17 % (ref 5–12)
NEUTROPHILS NFR BLD AUTO: 1.64 10*3/MM3 (ref 1.7–7)
NEUTROPHILS NFR BLD AUTO: 45.2 % (ref 42.7–76)
NITRITE UR QL STRIP: NEGATIVE
NRBC BLD AUTO-RTO: 0 /100 WBC (ref 0–0.2)
PH UR STRIP.AUTO: 6.5 [PH] (ref 5–8)
PLATELET # BLD AUTO: 94 10*3/MM3 (ref 140–450)
PMV BLD AUTO: 11.2 FL (ref 6–12)
POTASSIUM SERPL-SCNC: 4.4 MMOL/L (ref 3.5–5.2)
PROT SERPL-MCNC: 5.9 G/DL (ref 6–8.5)
PROT UR QL STRIP: NEGATIVE
RBC # BLD AUTO: 3.68 10*6/MM3 (ref 4.14–5.8)
SODIUM SERPL-SCNC: 137 MMOL/L (ref 136–145)
SP GR UR STRIP: 1.01 (ref 1–1.03)
UROBILINOGEN UR QL STRIP: ABNORMAL
WBC NRBC COR # BLD AUTO: 3.64 10*3/MM3 (ref 3.4–10.8)

## 2024-09-17 PROCEDURE — 85025 COMPLETE CBC W/AUTO DIFF WBC: CPT

## 2024-09-17 PROCEDURE — 82378 CARCINOEMBRYONIC ANTIGEN: CPT

## 2024-09-17 PROCEDURE — 36415 COLL VENOUS BLD VENIPUNCTURE: CPT

## 2024-09-17 PROCEDURE — 81003 URINALYSIS AUTO W/O SCOPE: CPT

## 2024-09-17 PROCEDURE — 80053 COMPREHEN METABOLIC PANEL: CPT

## 2024-09-20 LAB
BACTERIA SPEC AEROBE CULT: NORMAL
BACTERIA SPEC AEROBE CULT: NORMAL

## 2024-09-26 ENCOUNTER — APPOINTMENT (OUTPATIENT)
Dept: MRI IMAGING | Facility: HOSPITAL | Age: 54
End: 2024-09-26
Payer: COMMERCIAL

## 2024-09-26 ENCOUNTER — HOSPITAL ENCOUNTER (EMERGENCY)
Facility: HOSPITAL | Age: 54
Discharge: HOME | End: 2024-09-26
Attending: EMERGENCY MEDICINE
Payer: COMMERCIAL

## 2024-09-26 ENCOUNTER — APPOINTMENT (OUTPATIENT)
Dept: CT IMAGING | Facility: HOSPITAL | Age: 54
End: 2024-09-26
Payer: COMMERCIAL

## 2024-09-26 VITALS
BODY MASS INDEX: 41.47 KG/M2 | SYSTOLIC BLOOD PRESSURE: 152 MMHG | TEMPERATURE: 98.1 F | OXYGEN SATURATION: 98 % | HEART RATE: 89 BPM | RESPIRATION RATE: 18 BRPM | DIASTOLIC BLOOD PRESSURE: 61 MMHG | WEIGHT: 279.98 LBS | HEIGHT: 69 IN

## 2024-09-26 DIAGNOSIS — K76.9 LIVER LESION: ICD-10-CM

## 2024-09-26 DIAGNOSIS — R19.7 DIARRHEA, UNSPECIFIED TYPE: ICD-10-CM

## 2024-09-26 DIAGNOSIS — M51.36 BULGING LUMBAR DISC: ICD-10-CM

## 2024-09-26 DIAGNOSIS — M54.50 LOW BACK PAIN, UNSPECIFIED BACK PAIN LATERALITY, UNSPECIFIED CHRONICITY, UNSPECIFIED WHETHER SCIATICA PRESENT: ICD-10-CM

## 2024-09-26 DIAGNOSIS — M89.9 LESION OF BONE OF LUMBOSACRAL SPINE: Primary | ICD-10-CM

## 2024-09-26 DIAGNOSIS — K74.60 HEPATIC CIRRHOSIS, UNSPECIFIED HEPATIC CIRRHOSIS TYPE, UNSPECIFIED WHETHER ASCITES PRESENT: ICD-10-CM

## 2024-09-26 DIAGNOSIS — M51.369 BULGING LUMBAR DISC: ICD-10-CM

## 2024-09-26 LAB
ALBUMIN SERPL-MCNC: 3.7 G/DL (ref 3.5–5.2)
ALBUMIN/GLOB SERPL: 1.3 G/DL
ALP SERPL-CCNC: 110 U/L (ref 39–117)
ALT SERPL W P-5'-P-CCNC: 31 U/L (ref 1–41)
ANION GAP SERPL CALCULATED.3IONS-SCNC: 10.2 MMOL/L (ref 5–15)
AST SERPL-CCNC: 42 U/L (ref 1–40)
BASOPHILS # BLD AUTO: 0.01 10*3/MM3 (ref 0–0.2)
BASOPHILS NFR BLD AUTO: 0.2 % (ref 0–1.5)
BILIRUB SERPL-MCNC: 0.9 MG/DL (ref 0–1.2)
BILIRUB UR QL STRIP: NEGATIVE
BUN SERPL-MCNC: 11 MG/DL (ref 6–20)
BUN/CREAT SERPL: 10.4 (ref 7–25)
CALCIUM SPEC-SCNC: 8.8 MG/DL (ref 8.6–10.5)
CHLORIDE SERPL-SCNC: 103 MMOL/L (ref 98–107)
CLARITY UR: CLEAR
CO2 SERPL-SCNC: 23.8 MMOL/L (ref 22–29)
COLOR UR: ABNORMAL
CREAT SERPL-MCNC: 1.06 MG/DL (ref 0.76–1.27)
CRP SERPL-MCNC: 6.88 MG/DL (ref 0–0.5)
DEPRECATED RDW RBC AUTO: 58.3 FL (ref 37–54)
EGFRCR SERPLBLD CKD-EPI 2021: 82.9 ML/MIN/1.73
EOSINOPHIL # BLD AUTO: 0.04 10*3/MM3 (ref 0–0.4)
EOSINOPHIL NFR BLD AUTO: 0.8 % (ref 0.3–6.2)
ERYTHROCYTE [DISTWIDTH] IN BLOOD BY AUTOMATED COUNT: 18.5 % (ref 12.3–15.4)
GLOBULIN UR ELPH-MCNC: 2.9 GM/DL
GLUCOSE SERPL-MCNC: 121 MG/DL (ref 65–99)
GLUCOSE UR STRIP-MCNC: NEGATIVE MG/DL
HCT VFR BLD AUTO: 35.2 % (ref 37.5–51)
HGB BLD-MCNC: 11.5 G/DL (ref 13–17.7)
HGB UR QL STRIP.AUTO: NEGATIVE
HOLD SPECIMEN: NORMAL
HOLD SPECIMEN: NORMAL
IMM GRANULOCYTES # BLD AUTO: 0.02 10*3/MM3 (ref 0–0.05)
IMM GRANULOCYTES NFR BLD AUTO: 0.4 % (ref 0–0.5)
KETONES UR QL STRIP: ABNORMAL
LEUKOCYTE ESTERASE UR QL STRIP.AUTO: NEGATIVE
LIPASE SERPL-CCNC: 38 U/L (ref 13–60)
LYMPHOCYTES # BLD AUTO: 0.65 10*3/MM3 (ref 0.7–3.1)
LYMPHOCYTES NFR BLD AUTO: 13.6 % (ref 19.6–45.3)
MCH RBC QN AUTO: 28.3 PG (ref 26.6–33)
MCHC RBC AUTO-ENTMCNC: 32.7 G/DL (ref 31.5–35.7)
MCV RBC AUTO: 86.7 FL (ref 79–97)
MONOCYTES # BLD AUTO: 0.9 10*3/MM3 (ref 0.1–0.9)
MONOCYTES NFR BLD AUTO: 18.9 % (ref 5–12)
NEUTROPHILS NFR BLD AUTO: 3.15 10*3/MM3 (ref 1.7–7)
NEUTROPHILS NFR BLD AUTO: 66.1 % (ref 42.7–76)
NITRITE UR QL STRIP: NEGATIVE
NRBC BLD AUTO-RTO: 0 /100 WBC (ref 0–0.2)
PH UR STRIP.AUTO: 5.5 [PH] (ref 5–8)
PLATELET # BLD AUTO: 129 10*3/MM3 (ref 140–450)
PMV BLD AUTO: 10.4 FL (ref 6–12)
POTASSIUM SERPL-SCNC: 4.1 MMOL/L (ref 3.5–5.2)
PROT SERPL-MCNC: 6.6 G/DL (ref 6–8.5)
PROT UR QL STRIP: ABNORMAL
RBC # BLD AUTO: 4.06 10*6/MM3 (ref 4.14–5.8)
SODIUM SERPL-SCNC: 137 MMOL/L (ref 136–145)
SP GR UR STRIP: >=1.03 (ref 1–1.03)
UROBILINOGEN UR QL STRIP: ABNORMAL
WBC NRBC COR # BLD AUTO: 4.77 10*3/MM3 (ref 3.4–10.8)
WHOLE BLOOD HOLD COAG: NORMAL
WHOLE BLOOD HOLD SPECIMEN: NORMAL

## 2024-09-26 PROCEDURE — 96361 HYDRATE IV INFUSION ADD-ON: CPT

## 2024-09-26 PROCEDURE — 81003 URINALYSIS AUTO W/O SCOPE: CPT

## 2024-09-26 PROCEDURE — 0 GADOBENATE DIMEGLUMINE 529 MG/ML SOLUTION: Performed by: EMERGENCY MEDICINE

## 2024-09-26 PROCEDURE — 74176 CT ABD & PELVIS W/O CONTRAST: CPT

## 2024-09-26 PROCEDURE — 99285 EMERGENCY DEPT VISIT HI MDM: CPT

## 2024-09-26 PROCEDURE — 25010000002 ONDANSETRON PER 1 MG

## 2024-09-26 PROCEDURE — A9577 INJ MULTIHANCE: HCPCS | Performed by: EMERGENCY MEDICINE

## 2024-09-26 PROCEDURE — 86140 C-REACTIVE PROTEIN: CPT

## 2024-09-26 PROCEDURE — 96374 THER/PROPH/DIAG INJ IV PUSH: CPT

## 2024-09-26 PROCEDURE — 25010000002 HYDROMORPHONE 1 MG/ML SOLUTION: Performed by: EMERGENCY MEDICINE

## 2024-09-26 PROCEDURE — 25010000002 ORPHENADRINE CITRATE PER 60 MG

## 2024-09-26 PROCEDURE — 80053 COMPREHEN METABOLIC PANEL: CPT

## 2024-09-26 PROCEDURE — 85025 COMPLETE CBC W/AUTO DIFF WBC: CPT

## 2024-09-26 PROCEDURE — 36415 COLL VENOUS BLD VENIPUNCTURE: CPT

## 2024-09-26 PROCEDURE — 96375 TX/PRO/DX INJ NEW DRUG ADDON: CPT

## 2024-09-26 PROCEDURE — 83690 ASSAY OF LIPASE: CPT

## 2024-09-26 PROCEDURE — 25810000003 SODIUM CHLORIDE 0.9 % SOLUTION

## 2024-09-26 PROCEDURE — 72158 MRI LUMBAR SPINE W/O & W/DYE: CPT

## 2024-09-26 PROCEDURE — 96372 THER/PROPH/DIAG INJ SC/IM: CPT

## 2024-09-26 RX ORDER — ONDANSETRON 2 MG/ML
4 INJECTION INTRAMUSCULAR; INTRAVENOUS ONCE
Status: COMPLETED | OUTPATIENT
Start: 2024-09-26 | End: 2024-09-26

## 2024-09-26 RX ORDER — METHOCARBAMOL 500 MG/1
500 TABLET, FILM COATED ORAL 3 TIMES DAILY PRN
Qty: 30 TABLET | Refills: 0 | Status: SHIPPED | OUTPATIENT
Start: 2024-09-26

## 2024-09-26 RX ORDER — SODIUM CHLORIDE 0.9 % (FLUSH) 0.9 %
10 SYRINGE (ML) INJECTION AS NEEDED
Status: DISCONTINUED | OUTPATIENT
Start: 2024-09-26 | End: 2024-09-26 | Stop reason: HOSPADM

## 2024-09-26 RX ORDER — OXYCODONE HYDROCHLORIDE 5 MG/1
5 TABLET ORAL EVERY 4 HOURS PRN
Qty: 12 TABLET | Refills: 0 | Status: SHIPPED | OUTPATIENT
Start: 2024-09-26

## 2024-09-26 RX ORDER — LIDOCAINE 50 MG/G
1 PATCH TOPICAL DAILY PRN
Qty: 30 PATCH | Refills: 0 | Status: SHIPPED | OUTPATIENT
Start: 2024-09-26

## 2024-09-26 RX ORDER — ORPHENADRINE CITRATE 30 MG/ML
60 INJECTION INTRAMUSCULAR; INTRAVENOUS ONCE
Status: COMPLETED | OUTPATIENT
Start: 2024-09-26 | End: 2024-09-26

## 2024-09-26 RX ORDER — ONDANSETRON 4 MG/1
4 TABLET, ORALLY DISINTEGRATING ORAL EVERY 8 HOURS PRN
Qty: 12 TABLET | Refills: 0 | Status: SHIPPED | OUTPATIENT
Start: 2024-09-26

## 2024-09-26 RX ADMIN — HYDROMORPHONE HYDROCHLORIDE 1 MG: 1 INJECTION, SOLUTION INTRAMUSCULAR; INTRAVENOUS; SUBCUTANEOUS at 17:18

## 2024-09-26 RX ADMIN — ONDANSETRON 4 MG: 2 INJECTION INTRAMUSCULAR; INTRAVENOUS at 17:17

## 2024-09-26 RX ADMIN — ORPHENADRINE CITRATE 60 MG: 30 INJECTION, SOLUTION INTRAMUSCULAR; INTRAVENOUS at 17:17

## 2024-09-26 RX ADMIN — GADOBENATE DIMEGLUMINE 26 ML: 529 INJECTION, SOLUTION INTRAVENOUS at 16:17

## 2024-09-26 RX ADMIN — SODIUM CHLORIDE 1000 ML: 9 INJECTION, SOLUTION INTRAVENOUS at 17:19

## 2024-09-26 NOTE — ED PROVIDER NOTES
"Subjective  History of Present Illness:    This is a 54-year-old male, history of BPH, colon cancer, with metastasis to lymph nodes per family at bedside, presenting today for evaluation of low back pain.  Patient reports that he woke up approxi-1 week ago and noticed his left lower back was hurting, pain significantly made worse with movement, feels like a \"pulled muscle\".  He denies any difficulty urinating or burning when urinating.  He does report when he is trying to get out of the bed he does urinate on himself at times.  No urinary retention or fecal incontinence.  He reports that he has had diarrhea over 1 week, recently hospitalized for Campylobacter enteritis.  Reports the diarrhea has slowed down some.  Associated nausea, no vomiting.  No dysuria hematuria urgency or frequency.  He reports that he has some more trouble lifting up his left side of his lower extremity than normal secondary to the pain in his back.  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  No falls trauma or injury.  Denies chest pain denies shortness of breath.  Patient reports he has had difficulty standing up secondary to the pain.  Patient reports he feels somewhat dehydrated secondary to the diarrhea and overall fatigue and malaise      Nurses Notes reviewed and agree, including vitals, allergies, social history and prior medical history.     REVIEW OF SYSTEMS: All systems reviewed and not pertinent unless noted.  Review of Systems   Constitutional:  Negative for fever.   Respiratory:  Negative for shortness of breath.    Cardiovascular:  Negative for chest pain.   Gastrointestinal:  Positive for abdominal pain, diarrhea and nausea. Negative for vomiting.   Musculoskeletal:  Positive for back pain.   Neurological:  Positive for weakness.   All other systems reviewed and are negative.      Past Medical History:   Diagnosis Date    Allergic rhinitis     Anemia     Benign prostate hyperplasia     Cancer     Depression     " "Diabetes mellitus     Hypertension     Hyperuricemia     Osteoarthritis     Sleep apnea        Allergies:    Patient has no known allergies.      Past Surgical History:   Procedure Laterality Date    COLONOSCOPY N/A 5/8/2023    Procedure: COLONOSCOPY WITH POLYPECTOMY AND BIOPSY AND TATTOO;  Surgeon: Eileen Boggs MD;  Location: [facility] ENDOSCOPY;  Service: Gastroenterology;  Laterality: N/A;    ENDOSCOPY N/A 5/8/2023    Procedure: ESOPHAGOGASTRODUODENOSCOPY WITH BIOPSY;  Surgeon: Eileen Boggs MD;  Location: [facility] ENDOSCOPY;  Service: Gastroenterology;  Laterality: N/A;    KNEE SURGERY Left     ROTATOR CUFF REPAIR Left     WRIST SURGERY Right          Social History     Socioeconomic History    Marital status:    Tobacco Use    Smoking status: Never     Passive exposure: Never    Smokeless tobacco: Never   Vaping Use    Vaping status: Never Used   Substance and Sexual Activity    Alcohol use: Yes     Comment: occasional    Drug use: No    Sexual activity: Defer         Family History   Problem Relation Age of Onset    Asthma Mother     Diabetes Mother     Asthma Father     Diabetes Father     Heart disease Father     Hypertension Father     Stroke Father     Colon cancer Neg Hx        Objective  Physical Exam:  /61 (BP Location: Left arm, Patient Position: Sitting)   Pulse 89   Temp 98.1 °F (36.7 °C) (Oral)   Resp 18   Ht 175 cm (68.9\")   Wt 127 kg (279 lb 15.8 oz)   SpO2 98%   BMI 41.47 kg/m²      Physical Exam  Vitals and nursing note reviewed.   Constitutional:       General: He is not in acute distress.     Appearance: Normal appearance. He is obese. He is not ill-appearing, toxic-appearing or diaphoretic.   HENT:      Head: Normocephalic and atraumatic.      Nose: Nose normal.      Mouth/Throat:      Mouth: Mucous membranes are moist.      Pharynx: Oropharynx is clear.   Eyes:      Extraocular Movements: Extraocular movements intact.      Pupils: Pupils are equal, round, " and reactive to light.   Cardiovascular:      Rate and Rhythm: Normal rate and regular rhythm.      Pulses: Normal pulses.      Heart sounds: Normal heart sounds.   Pulmonary:      Effort: Pulmonary effort is normal. No respiratory distress.      Breath sounds: Normal breath sounds. No stridor. No wheezing, rhonchi or rales.   Chest:      Chest wall: No tenderness.   Abdominal:      General: There is no distension.      Palpations: Abdomen is soft.      Tenderness: There is no abdominal tenderness. There is no guarding or rebound.   Musculoskeletal:         General: Normal range of motion.      Cervical back: Normal range of motion.   Skin:     General: Skin is warm and dry.      Capillary Refill: Capillary refill takes less than 2 seconds.   Neurological:      General: No focal deficit present.      Mental Status: He is alert and oriented to person, place, and time.   Psychiatric:         Mood and Affect: Mood normal.         Behavior: Behavior normal.         Thought Content: Thought content normal.         Judgment: Judgment normal.               Procedures    ED Course:    ED Course as of 09/26/24 1805   Thu Sep 26, 2024   1719 MRI Lumbar Spine With & Without Contrast [JR]      ED Course User Index  [JR] Jos Pickens PA-C       Lab Results (last 24 hours)       Procedure Component Value Units Date/Time    CBC Auto Differential [[id number]]  (Abnormal) Collected: 09/26/24 1509    Specimen: Blood Updated: 09/26/24 1556     WBC 4.77 10*3/mm3      RBC 4.06 10*6/mm3      Hemoglobin 11.5 g/dL      Hematocrit 35.2 %      MCV 86.7 fL      MCH 28.3 pg      MCHC 32.7 g/dL      RDW 18.5 %      RDW-SD 58.3 fl      MPV 10.4 fL      Platelets 129 10*3/mm3      Neutrophil % 66.1 %      Lymphocyte % 13.6 %      Monocyte % 18.9 %      Eosinophil % 0.8 %      Basophil % 0.2 %      Immature Grans % 0.4 %      Neutrophils, Absolute 3.15 10*3/mm3      Lymphocytes, Absolute 0.65 10*3/mm3      Monocytes, Absolute 0.90 10*3/mm3       Eosinophils, Absolute 0.04 10*3/mm3      Basophils, Absolute 0.01 10*3/mm3      Immature Grans, Absolute 0.02 10*3/mm3      nRBC 0.0 /100 WBC     Comprehensive Metabolic Panel [[id number]]  (Abnormal) Collected: 09/26/24 1509    Specimen: Blood Updated: 09/26/24 1548     Glucose 121 mg/dL      BUN 11 mg/dL      Creatinine 1.06 mg/dL      Sodium 137 mmol/L      Potassium 4.1 mmol/L      Chloride 103 mmol/L      CO2 23.8 mmol/L      Calcium 8.8 mg/dL      Total Protein 6.6 g/dL      Albumin 3.7 g/dL      ALT (SGPT) 31 U/L      AST (SGOT) 42 U/L      Alkaline Phosphatase 110 U/L      Total Bilirubin 0.9 mg/dL      Globulin 2.9 gm/dL      A/G Ratio 1.3 g/dL      BUN/Creatinine Ratio 10.4     Anion Gap 10.2 mmol/L      eGFR 82.9 mL/min/1.73     Narrative:      GFR Normal >60  Chronic Kidney Disease <60  Kidney Failure <15      C-reactive Protein [[id number]]  (Abnormal) Collected: 09/26/24 1509    Specimen: Blood Updated: 09/26/24 1548     C-Reactive Protein 6.88 mg/dL     Lipase [[id number]]  (Normal) Collected: 09/26/24 1509    Specimen: Blood Updated: 09/26/24 1554     Lipase 38 U/L     Urinalysis With Culture If Indicated - Urine, Clean Catch [[id number]]  (Abnormal) Collected: 09/26/24 1716    Specimen: Urine, Clean Catch Updated: 09/26/24 1734     Color, UA Dark Yellow     Appearance, UA Clear     pH, UA 5.5     Specific Gravity, UA >=1.030     Glucose, UA Negative     Ketones, UA Trace     Bilirubin, UA Negative     Blood, UA Negative     Protein, UA Trace     Leuk Esterase, UA Negative     Nitrite, UA Negative     Urobilinogen, UA 1.0 E.U./dL    Narrative:      In absence of clinical symptoms, the presence of pyuria, bacteria, and/or nitrites on the urinalysis result does not correlate with infection.  Urine microscopic not indicated.             CT Abdomen Pelvis Without Contrast    Result Date: 9/26/2024  PROCEDURE: CT ABDOMEN PELVIS WO CONTRAST-  HISTORY: Abdominal pain, nausea, vomiting, diarrhea  COMPARISON:  09/15/2024  Note: Noncontrast exam is less sensitive for assessment of the bowel and solid abdominal organs  TECHNIQUE: Noncontrast exam  CT ABDOMEN: Liver is cirrhotic in appearance with heterogeneous parenchyma, similar from prior exam. This may represent metastatic disease or multifocal hepatocellular carcinoma. Splenomegaly is seen. There is excreted contrast within the renal collecting systems without obstruction. Remaining organs are unremarkable. The bowel shows no evidence of obstruction. There is no free air or free fluid within the abdomen.  CT PELVIS: No bowel dilatation is seen. Trace fluid is noted. Bladder is unremarkable. Appendix is normal.      Impression: 1. Cirrhotic appearance of liver with hypodensities similar to prior exam which may be seen with metastatic disease or multifocal primary neoplasm.  2. No evidence of bowel obstruction or bowel wall thickening.  This study was performed with techniques to keep radiation doses as low as reasonably achievable (ALARA). Individualized dose reduction techniques using automated exposure control or adjustment of vA and/or kV according to the patient size were employed.      MRI Lumbar Spine With & Without Contrast    Result Date: 9/26/2024  PROCEDURE: MRI LUMBAR SPINE W WO CONTRAST-  HISTORY: Rule out cauda equina or epidural bleed, history of cancer with metastasis to the bone, left lower leg weakness, colorectal cancer  TECHNIQUE: Multiplanar MR without and with contrast administration.  FINDINGS: No fracture is present. Minimal anterolisthesis L4-5 is present. Otherwise alignment is normal. A sclerotic lesion is noted of the anterior L2 vertebral body. There is a subtle area of diminished signal involving the superior S1 vertebra. This may represent an area of sclerosis. No abnormal enhancement is present.  T12-L1: Mild annular disc bulge without canal stenosis  L1-L2: Mild annular disc bulge without canal stenosis  L2-L3: Mild annular disc bulge.  Without canal stenosis  L3-L4: Mild annular disc bulge. Mild facet arthropathy.  L4-L5: Moderate annular disc bulge and moderate facet arthropathy. Moderate central canal stenosis. Moderate bilateral neural foraminal narrowing.  L5-S1: Mild annular disc bulge. Mild facet arthropathy. No canal stenosis.      Impression: 1. No evidence of intracanal mass or abnormal enhancement. 2. Degenerative canal stenosis most pronounced L4-5. 3. Sclerotic areas involving the L2 and S1 vertebra. This is nonspecific. This could represent bone islands and endplate signal changes associated with degenerative disease although blastic metastases could have a similar appearance as well. Favor benign etiology given lack of associated enhancement.  This report was signed and finalized on 9/26/2024 4:40 PM by Jaime Gant MD.          MDM     Amount and/or Complexity of Data Reviewed  Decide to obtain previous medical records or to obtain history from someone other than the patient: yes        Initial impression of presenting illness: This is a 54-year-old male, history of cancer with metastasis presenting today for evaluation of lower back pain causing left lower extremity weakness    DDX: includes but is not limited to: Cauda equina, spinal epidural abscess, epidural bleed, metastasis, compressive neuropathy, spinal stenosis, compression fracture, pathologic fracture, enteritis, electrolyte imbalance, dehydration    Patient arrives hemodynamically stable afebrile nontachycardic nontachypneic nonhypoxic on room air with vitals interpreted by myself.     Pertinent features from physical exam: Patient has tenderness palpation of the midline lower lumbar spine, 2+ pulses distally, he does have difficulty with full movement of the left lower extremity secondary to pain that is caused in the low left back.  No overlying cellulitic changes of the low back.  Sensory intact to the lower extremities.  No evidence of foot drop.    Initial  diagnostic plan: CBC, CMP, CRP, lipase, urinalysis, MRI lumbar spine with and without contrast, CT abdomen pelvis without contrast    Results from initial plan were reviewed and interpreted by me revealing CBC no leukocytosis, H&H stable, platelets improved compared to prior, lipase normal, CRP elevated 6.88, CMP relatively benign, lipase was normal, MRI per radiology IMPRESSION:  1. No evidence of intracanal mass or abnormal enhancement.  2. Degenerative canal stenosis most pronounced L4-5.  3. Sclerotic areas involving the L2 and S1 vertebra. This is  nonspecific. This could represent bone islands and endplate signal  changes associated with degenerative disease although blastic metastases  could have a similar appearance as well. Favor benign etiology given  lack of associated enhancement.    CT abdomen pelvis per radiologyIMPRESSION:  1. Cirrhotic appearance of liver with hypodensities similar to prior  exam which may be seen with metastatic disease or multifocal primary  neoplasm.     2. No evidence of bowel obstruction or bowel wall thickening.    Urinalysis is negative for infectious process.        Diagnostic information from other sources: Prior record reviewed.  Reviewed prior admission with metastatic colon cancer to liver, pancytopenia due to chemotherapy, iron deficiency anemia, acute kidney injury.    Interventions / Re-evaluation: Norflex, Dilaudid.  Patient received MRI contrast.  Zofran, 1 L fluids.  Patient's range of motion seems improved after symptomatic treatment with IV muscle relaxer as well as Dilaudid.  Sitting in a chair resting comfortably in no acute distress.    Results/clinical rationale were discussed with patient at bedside.  Given MRI findings, do not suspect epidural bleed or cauda equina compressive syndrome at this time.  Discussed findings of liver lesions, possible vertebral lesions that may be metastatic in nature given his history, recommend close follow-up with his  oncologist team.  He does not appear clinically dehydrated.  Does not have findings suggestive of CHRISTIAN.  Will send outpatient pain medicine and have him follow-up with his oncology and primary care.    Consultations/Discussion of results with other physicians: Discussed with ED attending physician.    Disposition plan: Will discharge patient to follow-up with oncology provider, will send short course of oral pain medicine supplementation to patient's pharmacy.  Return precautions given  -----    Final diagnoses:   Lesion of bone of lumbosacral spine   Bulging lumbar disc   Low back pain, unspecified back pain laterality, unspecified chronicity, unspecified whether sciatica present   Hepatic cirrhosis, unspecified hepatic cirrhosis type, unspecified whether ascites present   Liver lesion   Diarrhea, unspecified type          Jos Pickens PA-C  09/26/24 [id number]

## 2024-09-26 NOTE — DISCHARGE INSTRUCTIONS
Follow-up with your oncologist as soon as possible.  Return for worsening symptoms, pain medicine has been sent your pharmacy as well as muscle relaxers, and lidocaine patches as well as Zofran.

## 2024-09-27 ENCOUNTER — READMISSION MANAGEMENT (OUTPATIENT)
Dept: CALL CENTER | Facility: HOSPITAL | Age: 54
End: 2024-09-27
Payer: COMMERCIAL

## 2024-10-01 ENCOUNTER — TRANSCRIBE ORDERS (OUTPATIENT)
Dept: LAB | Facility: HOSPITAL | Age: 54
End: 2024-10-01
Payer: COMMERCIAL

## 2024-10-01 ENCOUNTER — LAB (OUTPATIENT)
Dept: LAB | Facility: HOSPITAL | Age: 54
End: 2024-10-01
Payer: COMMERCIAL

## 2024-10-01 ENCOUNTER — READMISSION MANAGEMENT (OUTPATIENT)
Dept: CALL CENTER | Facility: HOSPITAL | Age: 54
End: 2024-10-01
Payer: COMMERCIAL

## 2024-10-01 DIAGNOSIS — C18.6 MALIGNANT NEOPLASM OF DESCENDING COLON: ICD-10-CM

## 2024-10-01 DIAGNOSIS — C18.6 MALIGNANT NEOPLASM OF DESCENDING COLON: Primary | ICD-10-CM

## 2024-10-01 LAB
ALBUMIN SERPL-MCNC: 3.2 G/DL (ref 3.5–5.2)
ALBUMIN/GLOB SERPL: 1.1 G/DL
ALP SERPL-CCNC: 118 U/L (ref 39–117)
ALT SERPL W P-5'-P-CCNC: 34 U/L (ref 1–41)
ANION GAP SERPL CALCULATED.3IONS-SCNC: 9.6 MMOL/L (ref 5–15)
AST SERPL-CCNC: 61 U/L (ref 1–40)
BASOPHILS # BLD AUTO: 0.03 10*3/MM3 (ref 0–0.2)
BASOPHILS NFR BLD AUTO: 0.7 % (ref 0–1.5)
BILIRUB SERPL-MCNC: 0.9 MG/DL (ref 0–1.2)
BUN SERPL-MCNC: 10 MG/DL (ref 6–20)
BUN/CREAT SERPL: 10.1 (ref 7–25)
CALCIUM SPEC-SCNC: 8.5 MG/DL (ref 8.6–10.5)
CEA SERPL-MCNC: 6.88 NG/ML
CHLORIDE SERPL-SCNC: 101 MMOL/L (ref 98–107)
CLUMPED PLATELETS: PRESENT
CO2 SERPL-SCNC: 22.4 MMOL/L (ref 22–29)
CREAT SERPL-MCNC: 0.99 MG/DL (ref 0.76–1.27)
DEPRECATED RDW RBC AUTO: 59.6 FL (ref 37–54)
EGFRCR SERPLBLD CKD-EPI 2021: 90 ML/MIN/1.73
EOSINOPHIL # BLD AUTO: 0.03 10*3/MM3 (ref 0–0.4)
EOSINOPHIL NFR BLD AUTO: 0.7 % (ref 0.3–6.2)
ERYTHROCYTE [DISTWIDTH] IN BLOOD BY AUTOMATED COUNT: 18.7 % (ref 12.3–15.4)
GLOBULIN UR ELPH-MCNC: 2.9 GM/DL
GLUCOSE SERPL-MCNC: 103 MG/DL (ref 65–99)
HCT VFR BLD AUTO: 35.4 % (ref 37.5–51)
HGB BLD-MCNC: 11.3 G/DL (ref 13–17.7)
IMM GRANULOCYTES # BLD AUTO: 0.02 10*3/MM3 (ref 0–0.05)
IMM GRANULOCYTES NFR BLD AUTO: 0.5 % (ref 0–0.5)
LYMPHOCYTES # BLD AUTO: 0.78 10*3/MM3 (ref 0.7–3.1)
LYMPHOCYTES NFR BLD AUTO: 19.4 % (ref 19.6–45.3)
MCH RBC QN AUTO: 27.7 PG (ref 26.6–33)
MCHC RBC AUTO-ENTMCNC: 31.9 G/DL (ref 31.5–35.7)
MCV RBC AUTO: 86.8 FL (ref 79–97)
MONOCYTES # BLD AUTO: 0.59 10*3/MM3 (ref 0.1–0.9)
MONOCYTES NFR BLD AUTO: 14.7 % (ref 5–12)
NEUTROPHILS NFR BLD AUTO: 2.57 10*3/MM3 (ref 1.7–7)
NEUTROPHILS NFR BLD AUTO: 64 % (ref 42.7–76)
NRBC BLD AUTO-RTO: 0 /100 WBC (ref 0–0.2)
PLATELET # BLD AUTO: ABNORMAL 10*3/UL
PMV BLD AUTO: 12.6 FL (ref 6–12)
POTASSIUM SERPL-SCNC: 4.1 MMOL/L (ref 3.5–5.2)
PROT SERPL-MCNC: 6.1 G/DL (ref 6–8.5)
RBC # BLD AUTO: 4.08 10*6/MM3 (ref 4.14–5.8)
RBC MORPH BLD: NORMAL
SMALL PLATELETS BLD QL SMEAR: ADEQUATE
SODIUM SERPL-SCNC: 133 MMOL/L (ref 136–145)
WBC MORPH BLD: NORMAL
WBC NRBC COR # BLD AUTO: 4.02 10*3/MM3 (ref 3.4–10.8)

## 2024-10-01 PROCEDURE — 87086 URINE CULTURE/COLONY COUNT: CPT

## 2024-10-01 PROCEDURE — 82378 CARCINOEMBRYONIC ANTIGEN: CPT

## 2024-10-01 PROCEDURE — 80053 COMPREHEN METABOLIC PANEL: CPT

## 2024-10-01 PROCEDURE — 36415 COLL VENOUS BLD VENIPUNCTURE: CPT

## 2024-10-01 PROCEDURE — 85025 COMPLETE CBC W/AUTO DIFF WBC: CPT

## 2024-10-01 PROCEDURE — 85007 BL SMEAR W/DIFF WBC COUNT: CPT

## 2024-10-01 NOTE — OUTREACH NOTE
Medical Week 2 Survey      Flowsheet Row Responses   [facility] facility patient discharged from? [facility]   Does the patient have one of the following disease processes/diagnoses(primary or secondary)? Other   Week 2 attempt successful? No   Unsuccessful attempts Attempt 2            Myrna ROLDAN - Registered Nurse

## 2024-10-02 LAB — BACTERIA SPEC AEROBE CULT: NORMAL

## 2024-10-15 ENCOUNTER — READMISSION MANAGEMENT (OUTPATIENT)
Dept: CALL CENTER | Facility: HOSPITAL | Age: 54
End: 2024-10-15
Payer: COMMERCIAL

## 2024-10-15 NOTE — OUTREACH NOTE
Medical Week 3 Survey      Flowsheet Row Responses   [facility] patient discharged from? [facility]   Does the patient have one of the following disease processes/diagnoses(primary or secondary)? Other   Week 3 attempt successful? No   Unsuccessful attempts Attempt 1   oke Shelton HAYS - Registered Nurse

## 2024-10-29 ENCOUNTER — TRANSCRIBE ORDERS (OUTPATIENT)
Dept: LAB | Facility: HOSPITAL | Age: 54
End: 2024-10-29
Payer: COMMERCIAL

## 2024-10-29 ENCOUNTER — LAB (OUTPATIENT)
Dept: LAB | Facility: HOSPITAL | Age: 54
End: 2024-10-29
Payer: COMMERCIAL

## 2024-10-29 DIAGNOSIS — C18.6 MALIGNANT NEOPLASM OF DESCENDING COLON: ICD-10-CM

## 2024-10-29 DIAGNOSIS — C18.6 MALIGNANT NEOPLASM OF DESCENDING COLON: Primary | ICD-10-CM

## 2024-10-29 LAB
ALBUMIN SERPL-MCNC: 3.9 G/DL (ref 3.5–5.2)
ALBUMIN/GLOB SERPL: 1.3 G/DL
ALP SERPL-CCNC: 139 U/L (ref 39–117)
ALT SERPL W P-5'-P-CCNC: 36 U/L (ref 1–41)
ANION GAP SERPL CALCULATED.3IONS-SCNC: 10.1 MMOL/L (ref 5–15)
AST SERPL-CCNC: 49 U/L (ref 1–40)
BASOPHILS # BLD AUTO: 0.03 10*3/MM3 (ref 0–0.2)
BASOPHILS NFR BLD AUTO: 0.5 % (ref 0–1.5)
BILIRUB SERPL-MCNC: 0.4 MG/DL (ref 0–1.2)
BUN SERPL-MCNC: 20 MG/DL (ref 6–20)
BUN/CREAT SERPL: 21.7 (ref 7–25)
CALCIUM SPEC-SCNC: 9.1 MG/DL (ref 8.6–10.5)
CEA SERPL-MCNC: 6.48 NG/ML
CHLORIDE SERPL-SCNC: 102 MMOL/L (ref 98–107)
CO2 SERPL-SCNC: 23.9 MMOL/L (ref 22–29)
CREAT SERPL-MCNC: 0.92 MG/DL (ref 0.76–1.27)
DEPRECATED RDW RBC AUTO: 58.6 FL (ref 37–54)
EGFRCR SERPLBLD CKD-EPI 2021: 98.2 ML/MIN/1.73
EOSINOPHIL # BLD AUTO: 0.21 10*3/MM3 (ref 0–0.4)
EOSINOPHIL NFR BLD AUTO: 3.4 % (ref 0.3–6.2)
ERYTHROCYTE [DISTWIDTH] IN BLOOD BY AUTOMATED COUNT: 19.1 % (ref 12.3–15.4)
GLOBULIN UR ELPH-MCNC: 2.9 GM/DL
GLUCOSE SERPL-MCNC: 125 MG/DL (ref 65–99)
HCT VFR BLD AUTO: 38.1 % (ref 37.5–51)
HGB BLD-MCNC: 12.2 G/DL (ref 13–17.7)
IMM GRANULOCYTES # BLD AUTO: 0.02 10*3/MM3 (ref 0–0.05)
IMM GRANULOCYTES NFR BLD AUTO: 0.3 % (ref 0–0.5)
LYMPHOCYTES # BLD AUTO: 1.7 10*3/MM3 (ref 0.7–3.1)
LYMPHOCYTES NFR BLD AUTO: 27.3 % (ref 19.6–45.3)
MCH RBC QN AUTO: 26.8 PG (ref 26.6–33)
MCHC RBC AUTO-ENTMCNC: 32 G/DL (ref 31.5–35.7)
MCV RBC AUTO: 83.6 FL (ref 79–97)
MONOCYTES # BLD AUTO: 0.63 10*3/MM3 (ref 0.1–0.9)
MONOCYTES NFR BLD AUTO: 10.1 % (ref 5–12)
NEUTROPHILS NFR BLD AUTO: 3.63 10*3/MM3 (ref 1.7–7)
NEUTROPHILS NFR BLD AUTO: 58.4 % (ref 42.7–76)
NRBC BLD AUTO-RTO: 0 /100 WBC (ref 0–0.2)
PLATELET # BLD AUTO: 115 10*3/MM3 (ref 140–450)
PMV BLD AUTO: 10.9 FL (ref 6–12)
POTASSIUM SERPL-SCNC: 4.4 MMOL/L (ref 3.5–5.2)
PROT SERPL-MCNC: 6.8 G/DL (ref 6–8.5)
RBC # BLD AUTO: 4.56 10*6/MM3 (ref 4.14–5.8)
SODIUM SERPL-SCNC: 136 MMOL/L (ref 136–145)
WBC NRBC COR # BLD AUTO: 6.22 10*3/MM3 (ref 3.4–10.8)

## 2024-10-29 PROCEDURE — 36415 COLL VENOUS BLD VENIPUNCTURE: CPT

## 2024-10-29 PROCEDURE — 87086 URINE CULTURE/COLONY COUNT: CPT

## 2024-10-29 PROCEDURE — 85025 COMPLETE CBC W/AUTO DIFF WBC: CPT

## 2024-10-29 PROCEDURE — 82378 CARCINOEMBRYONIC ANTIGEN: CPT

## 2024-10-29 PROCEDURE — 80053 COMPREHEN METABOLIC PANEL: CPT

## 2024-10-29 PROCEDURE — 87147 CULTURE TYPE IMMUNOLOGIC: CPT

## 2024-10-30 LAB — BACTERIA SPEC AEROBE CULT: ABNORMAL

## 2024-11-12 ENCOUNTER — LAB (OUTPATIENT)
Dept: LAB | Facility: HOSPITAL | Age: 54
End: 2024-11-12
Payer: COMMERCIAL

## 2024-11-12 ENCOUNTER — TRANSCRIBE ORDERS (OUTPATIENT)
Dept: LAB | Facility: HOSPITAL | Age: 54
End: 2024-11-12
Payer: COMMERCIAL

## 2024-11-12 DIAGNOSIS — C18.6 MALIGNANT NEOPLASM OF DESCENDING COLON: Primary | ICD-10-CM

## 2024-11-12 DIAGNOSIS — C18.6 MALIGNANT NEOPLASM OF DESCENDING COLON: ICD-10-CM

## 2024-11-12 LAB
ALBUMIN SERPL-MCNC: 3.7 G/DL (ref 3.5–5.2)
ALBUMIN/GLOB SERPL: 1.3 G/DL
ALP SERPL-CCNC: 156 U/L (ref 39–117)
ALT SERPL W P-5'-P-CCNC: 58 U/L (ref 1–41)
ANION GAP SERPL CALCULATED.3IONS-SCNC: 11.6 MMOL/L (ref 5–15)
AST SERPL-CCNC: 46 U/L (ref 1–40)
BASOPHILS # BLD AUTO: 0.02 10*3/MM3 (ref 0–0.2)
BASOPHILS NFR BLD AUTO: 0.4 % (ref 0–1.5)
BILIRUB SERPL-MCNC: 0.4 MG/DL (ref 0–1.2)
BILIRUB UR QL STRIP: NEGATIVE
BUN SERPL-MCNC: 17 MG/DL (ref 6–20)
BUN/CREAT SERPL: 16.3 (ref 7–25)
CALCIUM SPEC-SCNC: 9.2 MG/DL (ref 8.6–10.5)
CEA SERPL-MCNC: 9.14 NG/ML
CHLORIDE SERPL-SCNC: 103 MMOL/L (ref 98–107)
CLARITY UR: CLEAR
CO2 SERPL-SCNC: 24.4 MMOL/L (ref 22–29)
COLOR UR: YELLOW
CREAT SERPL-MCNC: 1.04 MG/DL (ref 0.76–1.27)
DEPRECATED RDW RBC AUTO: 59.2 FL (ref 37–54)
EGFRCR SERPLBLD CKD-EPI 2021: 84.8 ML/MIN/1.73
EOSINOPHIL # BLD AUTO: 0.1 10*3/MM3 (ref 0–0.4)
EOSINOPHIL NFR BLD AUTO: 1.9 % (ref 0.3–6.2)
ERYTHROCYTE [DISTWIDTH] IN BLOOD BY AUTOMATED COUNT: 19.6 % (ref 12.3–15.4)
GLOBULIN UR ELPH-MCNC: 2.8 GM/DL
GLUCOSE SERPL-MCNC: 125 MG/DL (ref 65–99)
GLUCOSE UR STRIP-MCNC: NEGATIVE MG/DL
HCT VFR BLD AUTO: 38 % (ref 37.5–51)
HGB BLD-MCNC: 12.1 G/DL (ref 13–17.7)
HGB UR QL STRIP.AUTO: NEGATIVE
IMM GRANULOCYTES # BLD AUTO: 0.03 10*3/MM3 (ref 0–0.05)
IMM GRANULOCYTES NFR BLD AUTO: 0.6 % (ref 0–0.5)
KETONES UR QL STRIP: NEGATIVE
LEUKOCYTE ESTERASE UR QL STRIP.AUTO: NEGATIVE
LYMPHOCYTES # BLD AUTO: 1.35 10*3/MM3 (ref 0.7–3.1)
LYMPHOCYTES NFR BLD AUTO: 25.5 % (ref 19.6–45.3)
MCH RBC QN AUTO: 26.8 PG (ref 26.6–33)
MCHC RBC AUTO-ENTMCNC: 31.8 G/DL (ref 31.5–35.7)
MCV RBC AUTO: 84.3 FL (ref 79–97)
MONOCYTES # BLD AUTO: 0.62 10*3/MM3 (ref 0.1–0.9)
MONOCYTES NFR BLD AUTO: 11.7 % (ref 5–12)
NEUTROPHILS NFR BLD AUTO: 3.17 10*3/MM3 (ref 1.7–7)
NEUTROPHILS NFR BLD AUTO: 59.9 % (ref 42.7–76)
NITRITE UR QL STRIP: NEGATIVE
NRBC BLD AUTO-RTO: 0 /100 WBC (ref 0–0.2)
PH UR STRIP.AUTO: 6 [PH] (ref 5–8)
PLATELET # BLD AUTO: 119 10*3/MM3 (ref 140–450)
PMV BLD AUTO: 11 FL (ref 6–12)
POTASSIUM SERPL-SCNC: 4.3 MMOL/L (ref 3.5–5.2)
PROT SERPL-MCNC: 6.5 G/DL (ref 6–8.5)
PROT UR QL STRIP: NEGATIVE
RBC # BLD AUTO: 4.51 10*6/MM3 (ref 4.14–5.8)
SODIUM SERPL-SCNC: 139 MMOL/L (ref 136–145)
SP GR UR STRIP: 1.02 (ref 1–1.03)
UROBILINOGEN UR QL STRIP: NORMAL
WBC NRBC COR # BLD AUTO: 5.29 10*3/MM3 (ref 3.4–10.8)

## 2024-11-12 PROCEDURE — 82378 CARCINOEMBRYONIC ANTIGEN: CPT

## 2024-11-12 PROCEDURE — 85025 COMPLETE CBC W/AUTO DIFF WBC: CPT

## 2024-11-12 PROCEDURE — 80053 COMPREHEN METABOLIC PANEL: CPT

## 2024-11-12 PROCEDURE — 81003 URINALYSIS AUTO W/O SCOPE: CPT

## 2024-11-12 PROCEDURE — 36415 COLL VENOUS BLD VENIPUNCTURE: CPT

## 2024-12-03 ENCOUNTER — TRANSCRIBE ORDERS (OUTPATIENT)
Dept: LAB | Facility: HOSPITAL | Age: 54
End: 2024-12-03
Payer: COMMERCIAL

## 2024-12-03 ENCOUNTER — LAB (OUTPATIENT)
Dept: LAB | Facility: HOSPITAL | Age: 54
End: 2024-12-03
Payer: COMMERCIAL

## 2024-12-03 DIAGNOSIS — C18.6 MALIGNANT NEOPLASM OF DESCENDING COLON: Primary | ICD-10-CM

## 2024-12-03 DIAGNOSIS — C18.6 MALIGNANT NEOPLASM OF DESCENDING COLON: ICD-10-CM

## 2024-12-03 LAB
ALBUMIN SERPL-MCNC: 3.7 G/DL (ref 3.5–5.2)
ALBUMIN/GLOB SERPL: 1.4 G/DL
ALP SERPL-CCNC: 143 U/L (ref 39–117)
ALT SERPL W P-5'-P-CCNC: 45 U/L (ref 1–41)
ANION GAP SERPL CALCULATED.3IONS-SCNC: 9.7 MMOL/L (ref 5–15)
AST SERPL-CCNC: 45 U/L (ref 1–40)
BASOPHILS # BLD AUTO: 0.02 10*3/MM3 (ref 0–0.2)
BASOPHILS NFR BLD AUTO: 0.5 % (ref 0–1.5)
BILIRUB SERPL-MCNC: 0.4 MG/DL (ref 0–1.2)
BUN SERPL-MCNC: 23 MG/DL (ref 6–20)
BUN/CREAT SERPL: 21.3 (ref 7–25)
CALCIUM SPEC-SCNC: 8.9 MG/DL (ref 8.6–10.5)
CHLORIDE SERPL-SCNC: 104 MMOL/L (ref 98–107)
CO2 SERPL-SCNC: 24.3 MMOL/L (ref 22–29)
CREAT SERPL-MCNC: 1.08 MG/DL (ref 0.76–1.27)
DEPRECATED RDW RBC AUTO: 57.6 FL (ref 37–54)
EGFRCR SERPLBLD CKD-EPI 2021: 81 ML/MIN/1.73
EOSINOPHIL # BLD AUTO: 0.13 10*3/MM3 (ref 0–0.4)
EOSINOPHIL NFR BLD AUTO: 3.3 % (ref 0.3–6.2)
ERYTHROCYTE [DISTWIDTH] IN BLOOD BY AUTOMATED COUNT: 19.5 % (ref 12.3–15.4)
GLOBULIN UR ELPH-MCNC: 2.7 GM/DL
GLUCOSE SERPL-MCNC: 107 MG/DL (ref 65–99)
HCT VFR BLD AUTO: 36.1 % (ref 37.5–51)
HGB BLD-MCNC: 11.6 G/DL (ref 13–17.7)
IMM GRANULOCYTES # BLD AUTO: 0.01 10*3/MM3 (ref 0–0.05)
IMM GRANULOCYTES NFR BLD AUTO: 0.3 % (ref 0–0.5)
LYMPHOCYTES # BLD AUTO: 1.48 10*3/MM3 (ref 0.7–3.1)
LYMPHOCYTES NFR BLD AUTO: 37.9 % (ref 19.6–45.3)
MAGNESIUM SERPL-MCNC: 1.7 MG/DL (ref 1.6–2.6)
MCH RBC QN AUTO: 26.7 PG (ref 26.6–33)
MCHC RBC AUTO-ENTMCNC: 32.1 G/DL (ref 31.5–35.7)
MCV RBC AUTO: 83 FL (ref 79–97)
MONOCYTES # BLD AUTO: 0.51 10*3/MM3 (ref 0.1–0.9)
MONOCYTES NFR BLD AUTO: 13.1 % (ref 5–12)
NEUTROPHILS NFR BLD AUTO: 1.75 10*3/MM3 (ref 1.7–7)
NEUTROPHILS NFR BLD AUTO: 44.9 % (ref 42.7–76)
NRBC BLD AUTO-RTO: 0 /100 WBC (ref 0–0.2)
PLATELET # BLD AUTO: 125 10*3/MM3 (ref 140–450)
PMV BLD AUTO: 10.1 FL (ref 6–12)
POTASSIUM SERPL-SCNC: 4.8 MMOL/L (ref 3.5–5.2)
PROT SERPL-MCNC: 6.4 G/DL (ref 6–8.5)
RBC # BLD AUTO: 4.35 10*6/MM3 (ref 4.14–5.8)
SODIUM SERPL-SCNC: 138 MMOL/L (ref 136–145)
WBC NRBC COR # BLD AUTO: 3.9 10*3/MM3 (ref 3.4–10.8)

## 2024-12-03 PROCEDURE — 80053 COMPREHEN METABOLIC PANEL: CPT

## 2024-12-03 PROCEDURE — 83735 ASSAY OF MAGNESIUM: CPT

## 2024-12-03 PROCEDURE — 85025 COMPLETE CBC W/AUTO DIFF WBC: CPT

## 2024-12-03 PROCEDURE — 36415 COLL VENOUS BLD VENIPUNCTURE: CPT

## 2024-12-17 ENCOUNTER — LAB (OUTPATIENT)
Dept: LAB | Facility: HOSPITAL | Age: 54
End: 2024-12-17
Payer: COMMERCIAL

## 2024-12-17 ENCOUNTER — TRANSCRIBE ORDERS (OUTPATIENT)
Dept: LAB | Facility: HOSPITAL | Age: 54
End: 2024-12-17
Payer: COMMERCIAL

## 2024-12-17 DIAGNOSIS — C18.6 MALIGNANT NEOPLASM OF DESCENDING COLON: Primary | ICD-10-CM

## 2024-12-17 DIAGNOSIS — C18.6 MALIGNANT NEOPLASM OF DESCENDING COLON: ICD-10-CM

## 2024-12-17 LAB
ALBUMIN SERPL-MCNC: 3.5 G/DL (ref 3.5–5.2)
ALBUMIN/GLOB SERPL: 1.3 G/DL
ALP SERPL-CCNC: 144 U/L (ref 39–117)
ALT SERPL W P-5'-P-CCNC: 47 U/L (ref 1–41)
ANION GAP SERPL CALCULATED.3IONS-SCNC: 10.3 MMOL/L (ref 5–15)
ANISOCYTOSIS BLD QL: NORMAL
AST SERPL-CCNC: 41 U/L (ref 1–40)
BASOPHILS # BLD AUTO: 0.03 10*3/MM3 (ref 0–0.2)
BASOPHILS NFR BLD AUTO: 0.7 % (ref 0–1.5)
BILIRUB SERPL-MCNC: 0.3 MG/DL (ref 0–1.2)
BUN SERPL-MCNC: 19 MG/DL (ref 6–20)
BUN/CREAT SERPL: 17.9 (ref 7–25)
CALCIUM SPEC-SCNC: 9.1 MG/DL (ref 8.6–10.5)
CHLORIDE SERPL-SCNC: 102 MMOL/L (ref 98–107)
CO2 SERPL-SCNC: 24.7 MMOL/L (ref 22–29)
CREAT SERPL-MCNC: 1.06 MG/DL (ref 0.76–1.27)
DEPRECATED RDW RBC AUTO: 60.1 FL (ref 37–54)
EGFRCR SERPLBLD CKD-EPI 2021: 82.9 ML/MIN/1.73
EOSINOPHIL # BLD AUTO: 0.07 10*3/MM3 (ref 0–0.4)
EOSINOPHIL NFR BLD AUTO: 1.5 % (ref 0.3–6.2)
ERYTHROCYTE [DISTWIDTH] IN BLOOD BY AUTOMATED COUNT: 20.3 % (ref 12.3–15.4)
GLOBULIN UR ELPH-MCNC: 2.7 GM/DL
GLUCOSE SERPL-MCNC: 119 MG/DL (ref 65–99)
HCT VFR BLD AUTO: 35.7 % (ref 37.5–51)
HGB BLD-MCNC: 11.5 G/DL (ref 13–17.7)
IMM GRANULOCYTES # BLD AUTO: 0.01 10*3/MM3 (ref 0–0.05)
IMM GRANULOCYTES NFR BLD AUTO: 0.2 % (ref 0–0.5)
LYMPHOCYTES # BLD AUTO: 1.54 10*3/MM3 (ref 0.7–3.1)
LYMPHOCYTES NFR BLD AUTO: 33.7 % (ref 19.6–45.3)
MAGNESIUM SERPL-MCNC: 1.6 MG/DL (ref 1.6–2.6)
MCH RBC QN AUTO: 26.8 PG (ref 26.6–33)
MCHC RBC AUTO-ENTMCNC: 32.2 G/DL (ref 31.5–35.7)
MCV RBC AUTO: 83.2 FL (ref 79–97)
MONOCYTES # BLD AUTO: 0.53 10*3/MM3 (ref 0.1–0.9)
MONOCYTES NFR BLD AUTO: 11.6 % (ref 5–12)
NEUTROPHILS NFR BLD AUTO: 2.39 10*3/MM3 (ref 1.7–7)
NEUTROPHILS NFR BLD AUTO: 52.3 % (ref 42.7–76)
NRBC BLD AUTO-RTO: 0 /100 WBC (ref 0–0.2)
PLAT MORPH BLD: NORMAL
PLATELET # BLD AUTO: 122 10*3/MM3 (ref 140–450)
PMV BLD AUTO: 9.8 FL (ref 6–12)
POTASSIUM SERPL-SCNC: 4.4 MMOL/L (ref 3.5–5.2)
PROT SERPL-MCNC: 6.2 G/DL (ref 6–8.5)
RBC # BLD AUTO: 4.29 10*6/MM3 (ref 4.14–5.8)
SODIUM SERPL-SCNC: 137 MMOL/L (ref 136–145)
WBC MORPH BLD: NORMAL
WBC NRBC COR # BLD AUTO: 4.57 10*3/MM3 (ref 3.4–10.8)

## 2024-12-17 PROCEDURE — 80053 COMPREHEN METABOLIC PANEL: CPT

## 2024-12-17 PROCEDURE — 83735 ASSAY OF MAGNESIUM: CPT

## 2024-12-17 PROCEDURE — 85025 COMPLETE CBC W/AUTO DIFF WBC: CPT

## 2024-12-17 PROCEDURE — 85007 BL SMEAR W/DIFF WBC COUNT: CPT

## 2024-12-17 PROCEDURE — 36415 COLL VENOUS BLD VENIPUNCTURE: CPT

## 2024-12-31 ENCOUNTER — LAB (OUTPATIENT)
Dept: LAB | Facility: HOSPITAL | Age: 54
End: 2024-12-31
Payer: COMMERCIAL

## 2024-12-31 ENCOUNTER — TRANSCRIBE ORDERS (OUTPATIENT)
Dept: LAB | Facility: HOSPITAL | Age: 54
End: 2024-12-31
Payer: COMMERCIAL

## 2024-12-31 DIAGNOSIS — C18.6 MALIGNANT NEOPLASM OF DESCENDING COLON: Primary | ICD-10-CM

## 2024-12-31 DIAGNOSIS — C18.6 MALIGNANT NEOPLASM OF DESCENDING COLON: ICD-10-CM

## 2024-12-31 LAB
ALBUMIN SERPL-MCNC: 3.6 G/DL (ref 3.5–5.2)
ALBUMIN/GLOB SERPL: 1.2 G/DL
ALP SERPL-CCNC: 123 U/L (ref 39–117)
ALT SERPL W P-5'-P-CCNC: 44 U/L (ref 1–41)
ANION GAP SERPL CALCULATED.3IONS-SCNC: 8.2 MMOL/L (ref 5–15)
ANISOCYTOSIS BLD QL: NORMAL
AST SERPL-CCNC: 38 U/L (ref 1–40)
BASOPHILS # BLD AUTO: 0.02 10*3/MM3 (ref 0–0.2)
BASOPHILS NFR BLD AUTO: 0.5 % (ref 0–1.5)
BILIRUB SERPL-MCNC: 0.5 MG/DL (ref 0–1.2)
BUN SERPL-MCNC: 20 MG/DL (ref 6–20)
BUN/CREAT SERPL: 16.4 (ref 7–25)
CALCIUM SPEC-SCNC: 8.9 MG/DL (ref 8.6–10.5)
CHLORIDE SERPL-SCNC: 103 MMOL/L (ref 98–107)
CO2 SERPL-SCNC: 22.8 MMOL/L (ref 22–29)
CREAT SERPL-MCNC: 1.22 MG/DL (ref 0.76–1.27)
DEPRECATED RDW RBC AUTO: 62.4 FL (ref 37–54)
EGFRCR SERPLBLD CKD-EPI 2021: 70 ML/MIN/1.73
ELLIPTOCYTES BLD QL SMEAR: NORMAL
EOSINOPHIL # BLD AUTO: 0.1 10*3/MM3 (ref 0–0.4)
EOSINOPHIL NFR BLD AUTO: 2.3 % (ref 0.3–6.2)
ERYTHROCYTE [DISTWIDTH] IN BLOOD BY AUTOMATED COUNT: 21.4 % (ref 12.3–15.4)
GLOBULIN UR ELPH-MCNC: 3 GM/DL
GLUCOSE SERPL-MCNC: 109 MG/DL (ref 65–99)
HCT VFR BLD AUTO: 36.1 % (ref 37.5–51)
HGB BLD-MCNC: 11.8 G/DL (ref 13–17.7)
IMM GRANULOCYTES # BLD AUTO: 0.01 10*3/MM3 (ref 0–0.05)
IMM GRANULOCYTES NFR BLD AUTO: 0.2 % (ref 0–0.5)
LYMPHOCYTES # BLD AUTO: 1.65 10*3/MM3 (ref 0.7–3.1)
LYMPHOCYTES NFR BLD AUTO: 37.2 % (ref 19.6–45.3)
MAGNESIUM SERPL-MCNC: 1.7 MG/DL (ref 1.6–2.6)
MCH RBC QN AUTO: 26.9 PG (ref 26.6–33)
MCHC RBC AUTO-ENTMCNC: 32.7 G/DL (ref 31.5–35.7)
MCV RBC AUTO: 82.4 FL (ref 79–97)
MONOCYTES # BLD AUTO: 0.62 10*3/MM3 (ref 0.1–0.9)
MONOCYTES NFR BLD AUTO: 14 % (ref 5–12)
NEUTROPHILS NFR BLD AUTO: 2.04 10*3/MM3 (ref 1.7–7)
NEUTROPHILS NFR BLD AUTO: 45.8 % (ref 42.7–76)
NRBC BLD AUTO-RTO: 0 /100 WBC (ref 0–0.2)
PLATELET # BLD AUTO: 110 10*3/MM3 (ref 140–450)
PMV BLD AUTO: 9.1 FL (ref 6–12)
POIKILOCYTOSIS BLD QL SMEAR: NORMAL
POTASSIUM SERPL-SCNC: 4.5 MMOL/L (ref 3.5–5.2)
PROT SERPL-MCNC: 6.6 G/DL (ref 6–8.5)
RBC # BLD AUTO: 4.38 10*6/MM3 (ref 4.14–5.8)
SMALL PLATELETS BLD QL SMEAR: NORMAL
SODIUM SERPL-SCNC: 134 MMOL/L (ref 136–145)
WBC MORPH BLD: NORMAL
WBC NRBC COR # BLD AUTO: 4.44 10*3/MM3 (ref 3.4–10.8)

## 2024-12-31 PROCEDURE — 85025 COMPLETE CBC W/AUTO DIFF WBC: CPT

## 2024-12-31 PROCEDURE — 85007 BL SMEAR W/DIFF WBC COUNT: CPT

## 2024-12-31 PROCEDURE — 36415 COLL VENOUS BLD VENIPUNCTURE: CPT

## 2024-12-31 PROCEDURE — 83735 ASSAY OF MAGNESIUM: CPT

## 2024-12-31 PROCEDURE — 80053 COMPREHEN METABOLIC PANEL: CPT

## 2025-01-14 ENCOUNTER — TRANSCRIBE ORDERS (OUTPATIENT)
Dept: LAB | Facility: HOSPITAL | Age: 55
End: 2025-01-14
Payer: COMMERCIAL

## 2025-01-14 ENCOUNTER — LAB (OUTPATIENT)
Dept: LAB | Facility: HOSPITAL | Age: 55
End: 2025-01-14
Payer: COMMERCIAL

## 2025-01-14 DIAGNOSIS — C18.6 MALIGNANT NEOPLASM OF DESCENDING COLON: ICD-10-CM

## 2025-01-14 DIAGNOSIS — C18.6 MALIGNANT NEOPLASM OF DESCENDING COLON: Primary | ICD-10-CM

## 2025-01-14 LAB
ALBUMIN SERPL-MCNC: 3.6 G/DL (ref 3.5–5.2)
ALBUMIN/GLOB SERPL: 1.3 G/DL
ALP SERPL-CCNC: 120 U/L (ref 39–117)
ALT SERPL W P-5'-P-CCNC: 40 U/L (ref 1–41)
ANION GAP SERPL CALCULATED.3IONS-SCNC: 10.4 MMOL/L (ref 5–15)
ANISOCYTOSIS BLD QL: NORMAL
AST SERPL-CCNC: 41 U/L (ref 1–40)
BASOPHILS # BLD AUTO: 0.02 10*3/MM3 (ref 0–0.2)
BASOPHILS NFR BLD AUTO: 0.5 % (ref 0–1.5)
BILIRUB SERPL-MCNC: 0.4 MG/DL (ref 0–1.2)
BUN SERPL-MCNC: 19 MG/DL (ref 6–20)
BUN/CREAT SERPL: 17.9 (ref 7–25)
CALCIUM SPEC-SCNC: 9.2 MG/DL (ref 8.6–10.5)
CHLORIDE SERPL-SCNC: 104 MMOL/L (ref 98–107)
CO2 SERPL-SCNC: 24.6 MMOL/L (ref 22–29)
CREAT SERPL-MCNC: 1.06 MG/DL (ref 0.76–1.27)
DEPRECATED RDW RBC AUTO: 65.3 FL (ref 37–54)
EGFRCR SERPLBLD CKD-EPI 2021: 82.9 ML/MIN/1.73
EOSINOPHIL # BLD AUTO: 0.13 10*3/MM3 (ref 0–0.4)
EOSINOPHIL NFR BLD AUTO: 2.9 % (ref 0.3–6.2)
ERYTHROCYTE [DISTWIDTH] IN BLOOD BY AUTOMATED COUNT: 21.5 % (ref 12.3–15.4)
GLOBULIN UR ELPH-MCNC: 2.8 GM/DL
GLUCOSE SERPL-MCNC: 125 MG/DL (ref 65–99)
HCT VFR BLD AUTO: 35.3 % (ref 37.5–51)
HGB BLD-MCNC: 11.6 G/DL (ref 13–17.7)
IMM GRANULOCYTES # BLD AUTO: 0.03 10*3/MM3 (ref 0–0.05)
IMM GRANULOCYTES NFR BLD AUTO: 0.7 % (ref 0–0.5)
LYMPHOCYTES # BLD AUTO: 1.63 10*3/MM3 (ref 0.7–3.1)
LYMPHOCYTES NFR BLD AUTO: 37 % (ref 19.6–45.3)
MAGNESIUM SERPL-MCNC: 1.8 MG/DL (ref 1.6–2.6)
MCH RBC QN AUTO: 27.6 PG (ref 26.6–33)
MCHC RBC AUTO-ENTMCNC: 32.9 G/DL (ref 31.5–35.7)
MCV RBC AUTO: 84 FL (ref 79–97)
MONOCYTES # BLD AUTO: 0.64 10*3/MM3 (ref 0.1–0.9)
MONOCYTES NFR BLD AUTO: 14.5 % (ref 5–12)
NEUTROPHILS NFR BLD AUTO: 1.96 10*3/MM3 (ref 1.7–7)
NEUTROPHILS NFR BLD AUTO: 44.4 % (ref 42.7–76)
NRBC BLD AUTO-RTO: 0 /100 WBC (ref 0–0.2)
PLAT MORPH BLD: NORMAL
PLATELET # BLD AUTO: 120 10*3/MM3 (ref 140–450)
PMV BLD AUTO: 10.2 FL (ref 6–12)
POTASSIUM SERPL-SCNC: 4.7 MMOL/L (ref 3.5–5.2)
PROT SERPL-MCNC: 6.4 G/DL (ref 6–8.5)
RBC # BLD AUTO: 4.2 10*6/MM3 (ref 4.14–5.8)
SODIUM SERPL-SCNC: 139 MMOL/L (ref 136–145)
WBC MORPH BLD: NORMAL
WBC NRBC COR # BLD AUTO: 4.41 10*3/MM3 (ref 3.4–10.8)

## 2025-01-14 PROCEDURE — 36415 COLL VENOUS BLD VENIPUNCTURE: CPT

## 2025-01-14 PROCEDURE — 80053 COMPREHEN METABOLIC PANEL: CPT

## 2025-01-14 PROCEDURE — 83735 ASSAY OF MAGNESIUM: CPT

## 2025-01-14 PROCEDURE — 85007 BL SMEAR W/DIFF WBC COUNT: CPT

## 2025-01-14 PROCEDURE — 85025 COMPLETE CBC W/AUTO DIFF WBC: CPT

## 2025-01-28 ENCOUNTER — LAB (OUTPATIENT)
Dept: LAB | Facility: HOSPITAL | Age: 55
End: 2025-01-28
Payer: COMMERCIAL

## 2025-01-28 ENCOUNTER — TRANSCRIBE ORDERS (OUTPATIENT)
Dept: LAB | Facility: HOSPITAL | Age: 55
End: 2025-01-28
Payer: COMMERCIAL

## 2025-01-28 DIAGNOSIS — C18.6 MALIGNANT NEOPLASM OF DESCENDING COLON: ICD-10-CM

## 2025-01-28 DIAGNOSIS — D50.9 IRON DEFICIENCY ANEMIA, UNSPECIFIED IRON DEFICIENCY ANEMIA TYPE: ICD-10-CM

## 2025-01-28 DIAGNOSIS — E03.9 HYPOTHYROIDISM, UNSPECIFIED TYPE: ICD-10-CM

## 2025-01-28 DIAGNOSIS — E11.9 TYPE 2 DIABETES MELLITUS WITHOUT COMPLICATION, UNSPECIFIED WHETHER LONG TERM INSULIN USE: Primary | ICD-10-CM

## 2025-01-28 DIAGNOSIS — E11.9 TYPE 2 DIABETES MELLITUS WITHOUT COMPLICATION, UNSPECIFIED WHETHER LONG TERM INSULIN USE: ICD-10-CM

## 2025-01-28 DIAGNOSIS — E79.0 HYPERURICEMIA WITHOUT SIGNS INFLAMMATORY ARTHRITIS/TOPHACEOUS DISEASE: ICD-10-CM

## 2025-01-28 DIAGNOSIS — C18.6 MALIGNANT NEOPLASM OF DESCENDING COLON: Primary | ICD-10-CM

## 2025-01-28 LAB
ALBUMIN SERPL-MCNC: 3.6 G/DL (ref 3.5–5.2)
ALBUMIN UR-MCNC: <1.2 MG/DL
ALBUMIN/GLOB SERPL: 1.5 G/DL
ALP SERPL-CCNC: 134 U/L (ref 39–117)
ALT SERPL W P-5'-P-CCNC: 44 U/L (ref 1–41)
ANION GAP SERPL CALCULATED.3IONS-SCNC: 8.2 MMOL/L (ref 5–15)
ANISOCYTOSIS BLD QL: NORMAL
AST SERPL-CCNC: 40 U/L (ref 1–40)
BASOPHILS # BLD AUTO: 0.01 10*3/MM3 (ref 0–0.2)
BASOPHILS NFR BLD AUTO: 0.3 % (ref 0–1.5)
BILIRUB SERPL-MCNC: 0.4 MG/DL (ref 0–1.2)
BUN SERPL-MCNC: 18 MG/DL (ref 6–20)
BUN/CREAT SERPL: 16.5 (ref 7–25)
CALCIUM SPEC-SCNC: 8.8 MG/DL (ref 8.6–10.5)
CHLORIDE SERPL-SCNC: 102 MMOL/L (ref 98–107)
CO2 SERPL-SCNC: 24.8 MMOL/L (ref 22–29)
CREAT SERPL-MCNC: 1.09 MG/DL (ref 0.76–1.27)
CREAT UR-MCNC: 121 MG/DL
DEPRECATED RDW RBC AUTO: 69.3 FL (ref 37–54)
EGFRCR SERPLBLD CKD-EPI 2021: 80.2 ML/MIN/1.73
EOSINOPHIL # BLD AUTO: 0.09 10*3/MM3 (ref 0–0.4)
EOSINOPHIL NFR BLD AUTO: 2.3 % (ref 0.3–6.2)
ERYTHROCYTE [DISTWIDTH] IN BLOOD BY AUTOMATED COUNT: 22.4 % (ref 12.3–15.4)
FOLATE SERPL-MCNC: >20 NG/ML (ref 4.78–24.2)
GLOBULIN UR ELPH-MCNC: 2.4 GM/DL
GLUCOSE SERPL-MCNC: 139 MG/DL (ref 65–99)
HBA1C MFR BLD: 6.5 % (ref 4.8–5.6)
HCT VFR BLD AUTO: 31.8 % (ref 37.5–51)
HGB BLD-MCNC: 10.6 G/DL (ref 13–17.7)
IMM GRANULOCYTES # BLD AUTO: 0.02 10*3/MM3 (ref 0–0.05)
IMM GRANULOCYTES NFR BLD AUTO: 0.5 % (ref 0–0.5)
IRON 24H UR-MRATE: 77 MCG/DL (ref 59–158)
LYMPHOCYTES # BLD AUTO: 1.24 10*3/MM3 (ref 0.7–3.1)
LYMPHOCYTES NFR BLD AUTO: 32 % (ref 19.6–45.3)
MAGNESIUM SERPL-MCNC: 1.8 MG/DL (ref 1.6–2.6)
MCH RBC QN AUTO: 29 PG (ref 26.6–33)
MCHC RBC AUTO-ENTMCNC: 33.3 G/DL (ref 31.5–35.7)
MCV RBC AUTO: 86.9 FL (ref 79–97)
MICROALBUMIN/CREAT UR: NORMAL MG/G{CREAT}
MONOCYTES # BLD AUTO: 0.6 10*3/MM3 (ref 0.1–0.9)
MONOCYTES NFR BLD AUTO: 15.5 % (ref 5–12)
NEUTROPHILS NFR BLD AUTO: 1.91 10*3/MM3 (ref 1.7–7)
NEUTROPHILS NFR BLD AUTO: 49.4 % (ref 42.7–76)
NRBC BLD AUTO-RTO: 0 /100 WBC (ref 0–0.2)
PLATELET # BLD AUTO: 117 10*3/MM3 (ref 140–450)
PMV BLD AUTO: 10.7 FL (ref 6–12)
POIKILOCYTOSIS BLD QL SMEAR: NORMAL
POTASSIUM SERPL-SCNC: 4.8 MMOL/L (ref 3.5–5.2)
PROT SERPL-MCNC: 6 G/DL (ref 6–8.5)
RBC # BLD AUTO: 3.66 10*6/MM3 (ref 4.14–5.8)
SMALL PLATELETS BLD QL SMEAR: NORMAL
SODIUM SERPL-SCNC: 135 MMOL/L (ref 136–145)
TSH SERPL DL<=0.05 MIU/L-ACNC: 8.02 UIU/ML (ref 0.27–4.2)
URATE SERPL-MCNC: 4.1 MG/DL (ref 3.4–7)
VIT B12 BLD-MCNC: 435 PG/ML (ref 211–946)
WBC MORPH BLD: NORMAL
WBC NRBC COR # BLD AUTO: 3.87 10*3/MM3 (ref 3.4–10.8)

## 2025-01-28 PROCEDURE — 83036 HEMOGLOBIN GLYCOSYLATED A1C: CPT

## 2025-01-28 PROCEDURE — 82570 ASSAY OF URINE CREATININE: CPT

## 2025-01-28 PROCEDURE — 84550 ASSAY OF BLOOD/URIC ACID: CPT

## 2025-01-28 PROCEDURE — 85007 BL SMEAR W/DIFF WBC COUNT: CPT

## 2025-01-28 PROCEDURE — 83735 ASSAY OF MAGNESIUM: CPT

## 2025-01-28 PROCEDURE — 82746 ASSAY OF FOLIC ACID SERUM: CPT

## 2025-01-28 PROCEDURE — 82607 VITAMIN B-12: CPT

## 2025-01-28 PROCEDURE — 80050 GENERAL HEALTH PANEL: CPT

## 2025-01-28 PROCEDURE — 82043 UR ALBUMIN QUANTITATIVE: CPT

## 2025-01-28 PROCEDURE — 36415 COLL VENOUS BLD VENIPUNCTURE: CPT

## 2025-01-28 PROCEDURE — 83540 ASSAY OF IRON: CPT

## 2025-02-11 ENCOUNTER — LAB (OUTPATIENT)
Dept: LAB | Facility: HOSPITAL | Age: 55
End: 2025-02-11
Payer: COMMERCIAL

## 2025-02-11 ENCOUNTER — TRANSCRIBE ORDERS (OUTPATIENT)
Dept: LAB | Facility: HOSPITAL | Age: 55
End: 2025-02-11
Payer: COMMERCIAL

## 2025-02-11 DIAGNOSIS — C18.6 MALIGNANT NEOPLASM OF DESCENDING COLON: ICD-10-CM

## 2025-02-11 DIAGNOSIS — C18.6 MALIGNANT NEOPLASM OF DESCENDING COLON: Primary | ICD-10-CM

## 2025-02-11 LAB
ALBUMIN SERPL-MCNC: 3.5 G/DL (ref 3.5–5.2)
ALBUMIN/GLOB SERPL: 1.3 G/DL
ALP SERPL-CCNC: 119 U/L (ref 39–117)
ALT SERPL W P-5'-P-CCNC: 37 U/L (ref 1–41)
ANION GAP SERPL CALCULATED.3IONS-SCNC: 11.7 MMOL/L (ref 5–15)
AST SERPL-CCNC: 31 U/L (ref 1–40)
BASOPHILS # BLD AUTO: 0.03 10*3/MM3 (ref 0–0.2)
BASOPHILS NFR BLD AUTO: 0.7 % (ref 0–1.5)
BILIRUB SERPL-MCNC: 0.5 MG/DL (ref 0–1.2)
BUN SERPL-MCNC: 29 MG/DL (ref 6–20)
BUN/CREAT SERPL: 22.3 (ref 7–25)
CALCIUM SPEC-SCNC: 9.2 MG/DL (ref 8.6–10.5)
CHLORIDE SERPL-SCNC: 102 MMOL/L (ref 98–107)
CO2 SERPL-SCNC: 21.3 MMOL/L (ref 22–29)
CREAT SERPL-MCNC: 1.3 MG/DL (ref 0.76–1.27)
DEPRECATED RDW RBC AUTO: 69 FL (ref 37–54)
EGFRCR SERPLBLD CKD-EPI 2021: 64.9 ML/MIN/1.73
EOSINOPHIL # BLD AUTO: 0.11 10*3/MM3 (ref 0–0.4)
EOSINOPHIL NFR BLD AUTO: 2.5 % (ref 0.3–6.2)
ERYTHROCYTE [DISTWIDTH] IN BLOOD BY AUTOMATED COUNT: 21.7 % (ref 12.3–15.4)
GLOBULIN UR ELPH-MCNC: 2.7 GM/DL
GLUCOSE SERPL-MCNC: 147 MG/DL (ref 65–99)
HCT VFR BLD AUTO: 31.4 % (ref 37.5–51)
HGB BLD-MCNC: 10.6 G/DL (ref 13–17.7)
HYPOCHROMIA BLD QL: NORMAL
IMM GRANULOCYTES # BLD AUTO: 0.01 10*3/MM3 (ref 0–0.05)
IMM GRANULOCYTES NFR BLD AUTO: 0.2 % (ref 0–0.5)
LYMPHOCYTES # BLD AUTO: 1.44 10*3/MM3 (ref 0.7–3.1)
LYMPHOCYTES NFR BLD AUTO: 32.4 % (ref 19.6–45.3)
MAGNESIUM SERPL-MCNC: 1.7 MG/DL (ref 1.6–2.6)
MCH RBC QN AUTO: 29.4 PG (ref 26.6–33)
MCHC RBC AUTO-ENTMCNC: 33.8 G/DL (ref 31.5–35.7)
MCV RBC AUTO: 87 FL (ref 79–97)
MICROCYTES BLD QL: NORMAL
MONOCYTES # BLD AUTO: 0.6 10*3/MM3 (ref 0.1–0.9)
MONOCYTES NFR BLD AUTO: 13.5 % (ref 5–12)
NEUTROPHILS NFR BLD AUTO: 2.25 10*3/MM3 (ref 1.7–7)
NEUTROPHILS NFR BLD AUTO: 50.7 % (ref 42.7–76)
NRBC BLD AUTO-RTO: 0 /100 WBC (ref 0–0.2)
PLAT MORPH BLD: NORMAL
PLATELET # BLD AUTO: 110 10*3/MM3 (ref 140–450)
PMV BLD AUTO: 11.2 FL (ref 6–12)
POTASSIUM SERPL-SCNC: 4.5 MMOL/L (ref 3.5–5.2)
PROT SERPL-MCNC: 6.2 G/DL (ref 6–8.5)
RBC # BLD AUTO: 3.61 10*6/MM3 (ref 4.14–5.8)
SODIUM SERPL-SCNC: 135 MMOL/L (ref 136–145)
WBC MORPH BLD: NORMAL
WBC NRBC COR # BLD AUTO: 4.44 10*3/MM3 (ref 3.4–10.8)

## 2025-02-11 PROCEDURE — 83735 ASSAY OF MAGNESIUM: CPT

## 2025-02-11 PROCEDURE — 85007 BL SMEAR W/DIFF WBC COUNT: CPT

## 2025-02-11 PROCEDURE — 80053 COMPREHEN METABOLIC PANEL: CPT

## 2025-02-11 PROCEDURE — 36415 COLL VENOUS BLD VENIPUNCTURE: CPT

## 2025-02-11 PROCEDURE — 85025 COMPLETE CBC W/AUTO DIFF WBC: CPT

## 2025-02-25 ENCOUNTER — LAB (OUTPATIENT)
Dept: LAB | Facility: HOSPITAL | Age: 55
End: 2025-02-25
Payer: COMMERCIAL

## 2025-02-25 ENCOUNTER — TRANSCRIBE ORDERS (OUTPATIENT)
Dept: LAB | Facility: HOSPITAL | Age: 55
End: 2025-02-25
Payer: COMMERCIAL

## 2025-02-25 DIAGNOSIS — C18.6 MALIGNANT NEOPLASM OF DESCENDING COLON: Primary | ICD-10-CM

## 2025-02-25 DIAGNOSIS — C18.6 MALIGNANT NEOPLASM OF DESCENDING COLON: ICD-10-CM

## 2025-02-25 LAB
ALBUMIN SERPL-MCNC: 3.5 G/DL (ref 3.5–5.2)
ALBUMIN/GLOB SERPL: 1.3 G/DL
ALP SERPL-CCNC: 118 U/L (ref 39–117)
ALT SERPL W P-5'-P-CCNC: 40 U/L (ref 1–41)
ANION GAP SERPL CALCULATED.3IONS-SCNC: 10.2 MMOL/L (ref 5–15)
ANISOCYTOSIS BLD QL: NORMAL
AST SERPL-CCNC: 36 U/L (ref 1–40)
BASOPHILS # BLD AUTO: 0.02 10*3/MM3 (ref 0–0.2)
BASOPHILS NFR BLD AUTO: 0.6 % (ref 0–1.5)
BILIRUB SERPL-MCNC: 0.4 MG/DL (ref 0–1.2)
BUN SERPL-MCNC: 25 MG/DL (ref 6–20)
BUN/CREAT SERPL: 22.7 (ref 7–25)
CALCIUM SPEC-SCNC: 9.3 MG/DL (ref 8.6–10.5)
CHLORIDE SERPL-SCNC: 103 MMOL/L (ref 98–107)
CO2 SERPL-SCNC: 22.8 MMOL/L (ref 22–29)
CREAT SERPL-MCNC: 1.1 MG/DL (ref 0.76–1.27)
DEPRECATED RDW RBC AUTO: 67.7 FL (ref 37–54)
EGFRCR SERPLBLD CKD-EPI 2021: 79.3 ML/MIN/1.73
EOSINOPHIL # BLD AUTO: 0.06 10*3/MM3 (ref 0–0.4)
EOSINOPHIL NFR BLD AUTO: 1.7 % (ref 0.3–6.2)
ERYTHROCYTE [DISTWIDTH] IN BLOOD BY AUTOMATED COUNT: 20.5 % (ref 12.3–15.4)
GLOBULIN UR ELPH-MCNC: 2.6 GM/DL
GLUCOSE SERPL-MCNC: 164 MG/DL (ref 65–99)
HCT VFR BLD AUTO: 31.9 % (ref 37.5–51)
HGB BLD-MCNC: 10.5 G/DL (ref 13–17.7)
IMM GRANULOCYTES # BLD AUTO: 0.02 10*3/MM3 (ref 0–0.05)
IMM GRANULOCYTES NFR BLD AUTO: 0.6 % (ref 0–0.5)
LYMPHOCYTES # BLD AUTO: 1.14 10*3/MM3 (ref 0.7–3.1)
LYMPHOCYTES NFR BLD AUTO: 32.3 % (ref 19.6–45.3)
MAGNESIUM SERPL-MCNC: 1.7 MG/DL (ref 1.6–2.6)
MCH RBC QN AUTO: 29.3 PG (ref 26.6–33)
MCHC RBC AUTO-ENTMCNC: 32.9 G/DL (ref 31.5–35.7)
MCV RBC AUTO: 89.1 FL (ref 79–97)
MONOCYTES # BLD AUTO: 0.65 10*3/MM3 (ref 0.1–0.9)
MONOCYTES NFR BLD AUTO: 18.4 % (ref 5–12)
NEUTROPHILS NFR BLD AUTO: 1.64 10*3/MM3 (ref 1.7–7)
NEUTROPHILS NFR BLD AUTO: 46.4 % (ref 42.7–76)
NRBC BLD AUTO-RTO: 0 /100 WBC (ref 0–0.2)
PLAT MORPH BLD: NORMAL
PLATELET # BLD AUTO: 108 10*3/MM3 (ref 140–450)
PMV BLD AUTO: 10.9 FL (ref 6–12)
POTASSIUM SERPL-SCNC: 4.6 MMOL/L (ref 3.5–5.2)
PROT SERPL-MCNC: 6.1 G/DL (ref 6–8.5)
RBC # BLD AUTO: 3.58 10*6/MM3 (ref 4.14–5.8)
SODIUM SERPL-SCNC: 136 MMOL/L (ref 136–145)
WBC MORPH BLD: NORMAL
WBC NRBC COR # BLD AUTO: 3.53 10*3/MM3 (ref 3.4–10.8)

## 2025-02-25 PROCEDURE — 80053 COMPREHEN METABOLIC PANEL: CPT

## 2025-02-25 PROCEDURE — 85025 COMPLETE CBC W/AUTO DIFF WBC: CPT

## 2025-02-25 PROCEDURE — 83735 ASSAY OF MAGNESIUM: CPT

## 2025-02-25 PROCEDURE — 36415 COLL VENOUS BLD VENIPUNCTURE: CPT

## 2025-02-25 PROCEDURE — 85007 BL SMEAR W/DIFF WBC COUNT: CPT

## 2025-03-11 ENCOUNTER — TRANSCRIBE ORDERS (OUTPATIENT)
Dept: LAB | Facility: HOSPITAL | Age: 55
End: 2025-03-11
Payer: COMMERCIAL

## 2025-03-11 ENCOUNTER — LAB (OUTPATIENT)
Dept: LAB | Facility: HOSPITAL | Age: 55
End: 2025-03-11
Payer: COMMERCIAL

## 2025-03-11 DIAGNOSIS — C18.6 MALIGNANT NEOPLASM OF DESCENDING COLON: ICD-10-CM

## 2025-03-11 DIAGNOSIS — C18.6 MALIGNANT NEOPLASM OF DESCENDING COLON: Primary | ICD-10-CM

## 2025-03-11 LAB
ALBUMIN SERPL-MCNC: 3.6 G/DL (ref 3.5–5.2)
ALBUMIN/GLOB SERPL: 1.4 G/DL
ALP SERPL-CCNC: 122 U/L (ref 39–117)
ALT SERPL W P-5'-P-CCNC: 46 U/L (ref 1–41)
ANION GAP SERPL CALCULATED.3IONS-SCNC: 11.3 MMOL/L (ref 5–15)
ANISOCYTOSIS BLD QL: NORMAL
AST SERPL-CCNC: 36 U/L (ref 1–40)
BASOPHILS # BLD AUTO: 0.01 10*3/MM3 (ref 0–0.2)
BASOPHILS NFR BLD AUTO: 0.3 % (ref 0–1.5)
BILIRUB SERPL-MCNC: 0.4 MG/DL (ref 0–1.2)
BUN SERPL-MCNC: 20 MG/DL (ref 6–20)
BUN/CREAT SERPL: 16.7 (ref 7–25)
CALCIUM SPEC-SCNC: 9.2 MG/DL (ref 8.6–10.5)
CHLORIDE SERPL-SCNC: 100 MMOL/L (ref 98–107)
CO2 SERPL-SCNC: 23.7 MMOL/L (ref 22–29)
CREAT SERPL-MCNC: 1.2 MG/DL (ref 0.76–1.27)
DEPRECATED RDW RBC AUTO: 66.3 FL (ref 37–54)
EGFRCR SERPLBLD CKD-EPI 2021: 71.4 ML/MIN/1.73
EOSINOPHIL # BLD AUTO: 0.1 10*3/MM3 (ref 0–0.4)
EOSINOPHIL NFR BLD AUTO: 2.6 % (ref 0.3–6.2)
ERYTHROCYTE [DISTWIDTH] IN BLOOD BY AUTOMATED COUNT: 19.9 % (ref 12.3–15.4)
GLOBULIN UR ELPH-MCNC: 2.5 GM/DL
GLUCOSE SERPL-MCNC: 155 MG/DL (ref 65–99)
HCT VFR BLD AUTO: 32.4 % (ref 37.5–51)
HGB BLD-MCNC: 10.7 G/DL (ref 13–17.7)
IMM GRANULOCYTES # BLD AUTO: 0.03 10*3/MM3 (ref 0–0.05)
IMM GRANULOCYTES NFR BLD AUTO: 0.8 % (ref 0–0.5)
LYMPHOCYTES # BLD AUTO: 1.33 10*3/MM3 (ref 0.7–3.1)
LYMPHOCYTES NFR BLD AUTO: 34.7 % (ref 19.6–45.3)
MAGNESIUM SERPL-MCNC: 1.7 MG/DL (ref 1.6–2.6)
MCH RBC QN AUTO: 29.8 PG (ref 26.6–33)
MCHC RBC AUTO-ENTMCNC: 33 G/DL (ref 31.5–35.7)
MCV RBC AUTO: 90.3 FL (ref 79–97)
MONOCYTES # BLD AUTO: 0.61 10*3/MM3 (ref 0.1–0.9)
MONOCYTES NFR BLD AUTO: 15.9 % (ref 5–12)
NEUTROPHILS NFR BLD AUTO: 1.75 10*3/MM3 (ref 1.7–7)
NEUTROPHILS NFR BLD AUTO: 45.7 % (ref 42.7–76)
NRBC BLD AUTO-RTO: 0 /100 WBC (ref 0–0.2)
PLAT MORPH BLD: NORMAL
PLATELET # BLD AUTO: 118 10*3/MM3 (ref 140–450)
PMV BLD AUTO: 10.1 FL (ref 6–12)
POTASSIUM SERPL-SCNC: 4.7 MMOL/L (ref 3.5–5.2)
PROT SERPL-MCNC: 6.1 G/DL (ref 6–8.5)
RBC # BLD AUTO: 3.59 10*6/MM3 (ref 4.14–5.8)
SODIUM SERPL-SCNC: 135 MMOL/L (ref 136–145)
WBC MORPH BLD: NORMAL
WBC NRBC COR # BLD AUTO: 3.83 10*3/MM3 (ref 3.4–10.8)

## 2025-03-11 PROCEDURE — 85025 COMPLETE CBC W/AUTO DIFF WBC: CPT

## 2025-03-11 PROCEDURE — 80053 COMPREHEN METABOLIC PANEL: CPT

## 2025-03-11 PROCEDURE — 83735 ASSAY OF MAGNESIUM: CPT

## 2025-03-11 PROCEDURE — 85007 BL SMEAR W/DIFF WBC COUNT: CPT

## 2025-03-11 PROCEDURE — 36415 COLL VENOUS BLD VENIPUNCTURE: CPT

## 2025-03-26 ENCOUNTER — TRANSCRIBE ORDERS (OUTPATIENT)
Dept: LAB | Facility: HOSPITAL | Age: 55
End: 2025-03-26
Payer: COMMERCIAL

## 2025-03-26 ENCOUNTER — LAB (OUTPATIENT)
Dept: LAB | Facility: HOSPITAL | Age: 55
End: 2025-03-26
Payer: COMMERCIAL

## 2025-03-26 DIAGNOSIS — C18.6 MALIGNANT NEOPLASM OF DESCENDING COLON: Primary | ICD-10-CM

## 2025-03-26 DIAGNOSIS — C18.6 MALIGNANT NEOPLASM OF DESCENDING COLON: ICD-10-CM

## 2025-03-26 LAB
ALBUMIN SERPL-MCNC: 3.7 G/DL (ref 3.5–5.2)
ALBUMIN/GLOB SERPL: 1.6 G/DL
ALP SERPL-CCNC: 133 U/L (ref 39–117)
ALT SERPL W P-5'-P-CCNC: 41 U/L (ref 1–41)
ANION GAP SERPL CALCULATED.3IONS-SCNC: 11.8 MMOL/L (ref 5–15)
AST SERPL-CCNC: 42 U/L (ref 1–40)
BASOPHILS # BLD AUTO: 0.02 10*3/MM3 (ref 0–0.2)
BASOPHILS # BLD MANUAL: 0.09 10*3/MM3 (ref 0–0.2)
BASOPHILS NFR BLD AUTO: 0.5 % (ref 0–1.5)
BASOPHILS NFR BLD MANUAL: 2 % (ref 0–1.5)
BILIRUB SERPL-MCNC: 0.4 MG/DL (ref 0–1.2)
BUN SERPL-MCNC: 26 MG/DL (ref 6–20)
BUN/CREAT SERPL: 20.3 (ref 7–25)
CALCIUM SPEC-SCNC: 9.2 MG/DL (ref 8.6–10.5)
CHLORIDE SERPL-SCNC: 100 MMOL/L (ref 98–107)
CO2 SERPL-SCNC: 21.2 MMOL/L (ref 22–29)
CREAT SERPL-MCNC: 1.28 MG/DL (ref 0.76–1.27)
DEPRECATED RDW RBC AUTO: 61.7 FL (ref 37–54)
EGFRCR SERPLBLD CKD-EPI 2021: 66.1 ML/MIN/1.73
EOSINOPHIL # BLD AUTO: 0.06 10*3/MM3 (ref 0–0.4)
EOSINOPHIL # BLD MANUAL: 0.09 10*3/MM3 (ref 0–0.4)
EOSINOPHIL NFR BLD AUTO: 1.4 % (ref 0.3–6.2)
EOSINOPHIL NFR BLD MANUAL: 2 % (ref 0.3–6.2)
ERYTHROCYTE [DISTWIDTH] IN BLOOD BY AUTOMATED COUNT: 18.9 % (ref 12.3–15.4)
GLOBULIN UR ELPH-MCNC: 2.3 GM/DL
GLUCOSE SERPL-MCNC: 156 MG/DL (ref 65–99)
HCT VFR BLD AUTO: 31.1 % (ref 37.5–51)
HGB BLD-MCNC: 10.5 G/DL (ref 13–17.7)
IMM GRANULOCYTES # BLD AUTO: 0.03 10*3/MM3 (ref 0–0.05)
IMM GRANULOCYTES NFR BLD AUTO: 0.7 % (ref 0–0.5)
LYMPHOCYTES # BLD AUTO: 1.06 10*3/MM3 (ref 0.7–3.1)
LYMPHOCYTES # BLD MANUAL: 1.74 10*3/MM3 (ref 0.7–3.1)
LYMPHOCYTES NFR BLD AUTO: 24.3 % (ref 19.6–45.3)
LYMPHOCYTES NFR BLD MANUAL: 10 % (ref 5–12)
MAGNESIUM SERPL-MCNC: 1.6 MG/DL (ref 1.6–2.6)
MCH RBC QN AUTO: 29.9 PG (ref 26.6–33)
MCHC RBC AUTO-ENTMCNC: 33.8 G/DL (ref 31.5–35.7)
MCV RBC AUTO: 88.6 FL (ref 79–97)
METAMYELOCYTES NFR BLD MANUAL: 3 % (ref 0–0)
MONOCYTES # BLD AUTO: 1 10*3/MM3 (ref 0.1–0.9)
MONOCYTES # BLD: 0.44 10*3/MM3 (ref 0.1–0.9)
MONOCYTES NFR BLD AUTO: 22.9 % (ref 5–12)
NEUTROPHILS # BLD AUTO: 1.87 10*3/MM3 (ref 1.7–7)
NEUTROPHILS NFR BLD AUTO: 2.19 10*3/MM3 (ref 1.7–7)
NEUTROPHILS NFR BLD AUTO: 50.2 % (ref 42.7–76)
NEUTROPHILS NFR BLD MANUAL: 29 % (ref 42.7–76)
NEUTS BAND NFR BLD MANUAL: 14 % (ref 0–5)
NRBC BLD AUTO-RTO: 0 /100 WBC (ref 0–0.2)
PLAT MORPH BLD: NORMAL
PLATELET # BLD AUTO: 129 10*3/MM3 (ref 140–450)
PMV BLD AUTO: 10.3 FL (ref 6–12)
POTASSIUM SERPL-SCNC: 4.9 MMOL/L (ref 3.5–5.2)
PROT SERPL-MCNC: 6 G/DL (ref 6–8.5)
RBC # BLD AUTO: 3.51 10*6/MM3 (ref 4.14–5.8)
RBC MORPH BLD: NORMAL
SODIUM SERPL-SCNC: 133 MMOL/L (ref 136–145)
VARIANT LYMPHS NFR BLD MANUAL: 32 % (ref 19.6–45.3)
VARIANT LYMPHS NFR BLD MANUAL: 8 % (ref 0–5)
WBC MORPH BLD: NORMAL
WBC NRBC COR # BLD AUTO: 4.36 10*3/MM3 (ref 3.4–10.8)

## 2025-03-26 PROCEDURE — 83735 ASSAY OF MAGNESIUM: CPT

## 2025-03-26 PROCEDURE — 80053 COMPREHEN METABOLIC PANEL: CPT

## 2025-03-26 PROCEDURE — 85007 BL SMEAR W/DIFF WBC COUNT: CPT

## 2025-03-26 PROCEDURE — 36415 COLL VENOUS BLD VENIPUNCTURE: CPT

## 2025-03-26 PROCEDURE — 85025 COMPLETE CBC W/AUTO DIFF WBC: CPT

## 2025-04-09 ENCOUNTER — LAB (OUTPATIENT)
Dept: LAB | Facility: HOSPITAL | Age: 55
End: 2025-04-09
Payer: COMMERCIAL

## 2025-04-09 ENCOUNTER — TRANSCRIBE ORDERS (OUTPATIENT)
Dept: LAB | Facility: HOSPITAL | Age: 55
End: 2025-04-09
Payer: COMMERCIAL

## 2025-04-09 DIAGNOSIS — C18.6 MALIGNANT NEOPLASM OF DESCENDING COLON: Primary | ICD-10-CM

## 2025-04-09 DIAGNOSIS — C18.6 MALIGNANT NEOPLASM OF DESCENDING COLON: ICD-10-CM

## 2025-04-09 LAB
ALBUMIN SERPL-MCNC: 3.8 G/DL (ref 3.5–5.2)
ALBUMIN/GLOB SERPL: 1.6 G/DL
ALP SERPL-CCNC: 103 U/L (ref 39–117)
ALT SERPL W P-5'-P-CCNC: 42 U/L (ref 1–41)
ANION GAP SERPL CALCULATED.3IONS-SCNC: 9.5 MMOL/L (ref 5–15)
AST SERPL-CCNC: 34 U/L (ref 1–40)
BASOPHILS # BLD AUTO: 0.01 10*3/MM3 (ref 0–0.2)
BASOPHILS NFR BLD AUTO: 0.3 % (ref 0–1.5)
BILIRUB SERPL-MCNC: 0.3 MG/DL (ref 0–1.2)
BUN SERPL-MCNC: 18 MG/DL (ref 6–20)
BUN/CREAT SERPL: 17.6 (ref 7–25)
CALCIUM SPEC-SCNC: 8.8 MG/DL (ref 8.6–10.5)
CHLORIDE SERPL-SCNC: 103 MMOL/L (ref 98–107)
CO2 SERPL-SCNC: 23.5 MMOL/L (ref 22–29)
CREAT SERPL-MCNC: 1.02 MG/DL (ref 0.76–1.27)
DEPRECATED RDW RBC AUTO: 59.4 FL (ref 37–54)
EGFRCR SERPLBLD CKD-EPI 2021: 86.8 ML/MIN/1.73
EOSINOPHIL # BLD AUTO: 0.08 10*3/MM3 (ref 0–0.4)
EOSINOPHIL NFR BLD AUTO: 2.3 % (ref 0.3–6.2)
ERYTHROCYTE [DISTWIDTH] IN BLOOD BY AUTOMATED COUNT: 18.4 % (ref 12.3–15.4)
GLOBULIN UR ELPH-MCNC: 2.4 GM/DL
GLUCOSE SERPL-MCNC: 141 MG/DL (ref 65–99)
HCT VFR BLD AUTO: 31.8 % (ref 37.5–51)
HGB BLD-MCNC: 10.4 G/DL (ref 13–17.7)
IMM GRANULOCYTES # BLD AUTO: 0.02 10*3/MM3 (ref 0–0.05)
IMM GRANULOCYTES NFR BLD AUTO: 0.6 % (ref 0–0.5)
LYMPHOCYTES # BLD AUTO: 1.26 10*3/MM3 (ref 0.7–3.1)
LYMPHOCYTES NFR BLD AUTO: 36.8 % (ref 19.6–45.3)
MAGNESIUM SERPL-MCNC: 1.7 MG/DL (ref 1.6–2.6)
MCH RBC QN AUTO: 29.2 PG (ref 26.6–33)
MCHC RBC AUTO-ENTMCNC: 32.7 G/DL (ref 31.5–35.7)
MCV RBC AUTO: 89.3 FL (ref 79–97)
MONOCYTES # BLD AUTO: 0.61 10*3/MM3 (ref 0.1–0.9)
MONOCYTES NFR BLD AUTO: 17.8 % (ref 5–12)
NEUTROPHILS NFR BLD AUTO: 1.44 10*3/MM3 (ref 1.7–7)
NEUTROPHILS NFR BLD AUTO: 42.2 % (ref 42.7–76)
NRBC BLD AUTO-RTO: 0 /100 WBC (ref 0–0.2)
PLATELET # BLD AUTO: 109 10*3/MM3 (ref 140–450)
PMV BLD AUTO: 10 FL (ref 6–12)
POTASSIUM SERPL-SCNC: 4.5 MMOL/L (ref 3.5–5.2)
PROT SERPL-MCNC: 6.2 G/DL (ref 6–8.5)
RBC # BLD AUTO: 3.56 10*6/MM3 (ref 4.14–5.8)
SODIUM SERPL-SCNC: 136 MMOL/L (ref 136–145)
WBC NRBC COR # BLD AUTO: 3.42 10*3/MM3 (ref 3.4–10.8)

## 2025-04-09 PROCEDURE — 80053 COMPREHEN METABOLIC PANEL: CPT

## 2025-04-09 PROCEDURE — 36415 COLL VENOUS BLD VENIPUNCTURE: CPT

## 2025-04-09 PROCEDURE — 83735 ASSAY OF MAGNESIUM: CPT

## 2025-04-09 PROCEDURE — 85025 COMPLETE CBC W/AUTO DIFF WBC: CPT

## 2025-04-15 ENCOUNTER — TRANSCRIBE ORDERS (OUTPATIENT)
Dept: LAB | Facility: HOSPITAL | Age: 55
End: 2025-04-15
Payer: COMMERCIAL

## 2025-04-15 ENCOUNTER — LAB (OUTPATIENT)
Dept: LAB | Facility: HOSPITAL | Age: 55
End: 2025-04-15
Payer: COMMERCIAL

## 2025-04-15 DIAGNOSIS — C18.6 MALIGNANT NEOPLASM OF DESCENDING COLON: ICD-10-CM

## 2025-04-15 DIAGNOSIS — C18.6 MALIGNANT NEOPLASM OF DESCENDING COLON: Primary | ICD-10-CM

## 2025-04-15 LAB
ALBUMIN SERPL-MCNC: 3.7 G/DL (ref 3.5–5.2)
ALBUMIN/GLOB SERPL: 1.5 G/DL
ALP SERPL-CCNC: 109 U/L (ref 39–117)
ALT SERPL W P-5'-P-CCNC: 30 U/L (ref 1–41)
ANION GAP SERPL CALCULATED.3IONS-SCNC: 11.3 MMOL/L (ref 5–15)
AST SERPL-CCNC: 33 U/L (ref 1–40)
BASOPHILS # BLD AUTO: 0.02 10*3/MM3 (ref 0–0.2)
BASOPHILS NFR BLD AUTO: 0.3 % (ref 0–1.5)
BILIRUB SERPL-MCNC: 0.4 MG/DL (ref 0–1.2)
BUN SERPL-MCNC: 19 MG/DL (ref 6–20)
BUN/CREAT SERPL: 16.7 (ref 7–25)
CALCIUM SPEC-SCNC: 8.9 MG/DL (ref 8.6–10.5)
CHLORIDE SERPL-SCNC: 104 MMOL/L (ref 98–107)
CO2 SERPL-SCNC: 23.7 MMOL/L (ref 22–29)
CREAT SERPL-MCNC: 1.14 MG/DL (ref 0.76–1.27)
DEPRECATED RDW RBC AUTO: 61.2 FL (ref 37–54)
EGFRCR SERPLBLD CKD-EPI 2021: 76 ML/MIN/1.73
EOSINOPHIL # BLD AUTO: 0.12 10*3/MM3 (ref 0–0.4)
EOSINOPHIL NFR BLD AUTO: 1.9 % (ref 0.3–6.2)
ERYTHROCYTE [DISTWIDTH] IN BLOOD BY AUTOMATED COUNT: 18.7 % (ref 12.3–15.4)
GLOBULIN UR ELPH-MCNC: 2.5 GM/DL
GLUCOSE SERPL-MCNC: 141 MG/DL (ref 65–99)
HCT VFR BLD AUTO: 32.8 % (ref 37.5–51)
HGB BLD-MCNC: 10.6 G/DL (ref 13–17.7)
IMM GRANULOCYTES # BLD AUTO: 0.22 10*3/MM3 (ref 0–0.05)
IMM GRANULOCYTES NFR BLD AUTO: 3.5 % (ref 0–0.5)
LYMPHOCYTES # BLD AUTO: 1.45 10*3/MM3 (ref 0.7–3.1)
LYMPHOCYTES NFR BLD AUTO: 23.1 % (ref 19.6–45.3)
MAGNESIUM SERPL-MCNC: 1.7 MG/DL (ref 1.6–2.6)
MCH RBC QN AUTO: 29.4 PG (ref 26.6–33)
MCHC RBC AUTO-ENTMCNC: 32.3 G/DL (ref 31.5–35.7)
MCV RBC AUTO: 90.9 FL (ref 79–97)
MONOCYTES # BLD AUTO: 0.9 10*3/MM3 (ref 0.1–0.9)
MONOCYTES NFR BLD AUTO: 14.3 % (ref 5–12)
NEUTROPHILS NFR BLD AUTO: 3.57 10*3/MM3 (ref 1.7–7)
NEUTROPHILS NFR BLD AUTO: 56.9 % (ref 42.7–76)
NRBC BLD AUTO-RTO: 0 /100 WBC (ref 0–0.2)
PLATELET # BLD AUTO: 158 10*3/MM3 (ref 140–450)
PMV BLD AUTO: 10.9 FL (ref 6–12)
POTASSIUM SERPL-SCNC: 4.8 MMOL/L (ref 3.5–5.2)
PROT SERPL-MCNC: 6.2 G/DL (ref 6–8.5)
RBC # BLD AUTO: 3.61 10*6/MM3 (ref 4.14–5.8)
SODIUM SERPL-SCNC: 139 MMOL/L (ref 136–145)
WBC NRBC COR # BLD AUTO: 6.28 10*3/MM3 (ref 3.4–10.8)

## 2025-04-15 PROCEDURE — 80053 COMPREHEN METABOLIC PANEL: CPT

## 2025-04-15 PROCEDURE — 36415 COLL VENOUS BLD VENIPUNCTURE: CPT

## 2025-04-15 PROCEDURE — 85025 COMPLETE CBC W/AUTO DIFF WBC: CPT

## 2025-04-15 PROCEDURE — 83735 ASSAY OF MAGNESIUM: CPT

## 2025-04-17 ENCOUNTER — TRANSCRIBE ORDERS (OUTPATIENT)
Dept: ADMINISTRATIVE | Facility: HOSPITAL | Age: 55
End: 2025-04-17
Payer: COMMERCIAL

## 2025-04-17 DIAGNOSIS — M54.50 CHRONIC LOW BACK PAIN, UNSPECIFIED BACK PAIN LATERALITY, UNSPECIFIED WHETHER SCIATICA PRESENT: Primary | ICD-10-CM

## 2025-04-17 DIAGNOSIS — G89.29 CHRONIC LOW BACK PAIN, UNSPECIFIED BACK PAIN LATERALITY, UNSPECIFIED WHETHER SCIATICA PRESENT: Primary | ICD-10-CM

## 2025-04-29 ENCOUNTER — TRANSCRIBE ORDERS (OUTPATIENT)
Dept: LAB | Facility: HOSPITAL | Age: 55
End: 2025-04-29
Payer: COMMERCIAL

## 2025-04-29 ENCOUNTER — LAB (OUTPATIENT)
Dept: LAB | Facility: HOSPITAL | Age: 55
End: 2025-04-29
Payer: COMMERCIAL

## 2025-04-29 DIAGNOSIS — C18.6 MALIGNANT NEOPLASM OF DESCENDING COLON: Primary | ICD-10-CM

## 2025-04-29 DIAGNOSIS — C18.6 MALIGNANT NEOPLASM OF DESCENDING COLON: ICD-10-CM

## 2025-04-29 LAB
ALBUMIN SERPL-MCNC: 3.3 G/DL (ref 3.5–5.2)
ALBUMIN/GLOB SERPL: 1.3 G/DL
ALP SERPL-CCNC: 93 U/L (ref 39–117)
ALT SERPL W P-5'-P-CCNC: 34 U/L (ref 1–41)
ANION GAP SERPL CALCULATED.3IONS-SCNC: 11.2 MMOL/L (ref 5–15)
AST SERPL-CCNC: 32 U/L (ref 1–40)
BASOPHILS # BLD AUTO: 0.01 10*3/MM3 (ref 0–0.2)
BASOPHILS NFR BLD AUTO: 0.3 % (ref 0–1.5)
BILIRUB SERPL-MCNC: 0.3 MG/DL (ref 0–1.2)
BUN SERPL-MCNC: 12 MG/DL (ref 6–20)
BUN/CREAT SERPL: 13.6 (ref 7–25)
CALCIUM SPEC-SCNC: 8.2 MG/DL (ref 8.6–10.5)
CHLORIDE SERPL-SCNC: 101 MMOL/L (ref 98–107)
CO2 SERPL-SCNC: 20.8 MMOL/L (ref 22–29)
CREAT SERPL-MCNC: 0.88 MG/DL (ref 0.76–1.27)
DEPRECATED RDW RBC AUTO: 59.5 FL (ref 37–54)
EGFRCR SERPLBLD CKD-EPI 2021: 101.6 ML/MIN/1.73
EOSINOPHIL # BLD AUTO: 0.1 10*3/MM3 (ref 0–0.4)
EOSINOPHIL NFR BLD AUTO: 3.2 % (ref 0.3–6.2)
ERYTHROCYTE [DISTWIDTH] IN BLOOD BY AUTOMATED COUNT: 18 % (ref 12.3–15.4)
GLOBULIN UR ELPH-MCNC: 2.6 GM/DL
GLUCOSE SERPL-MCNC: 138 MG/DL (ref 65–99)
HCT VFR BLD AUTO: 31.7 % (ref 37.5–51)
HGB BLD-MCNC: 10.1 G/DL (ref 13–17.7)
IMM GRANULOCYTES # BLD AUTO: 0.01 10*3/MM3 (ref 0–0.05)
IMM GRANULOCYTES NFR BLD AUTO: 0.3 % (ref 0–0.5)
LYMPHOCYTES # BLD AUTO: 1.12 10*3/MM3 (ref 0.7–3.1)
LYMPHOCYTES NFR BLD AUTO: 35.9 % (ref 19.6–45.3)
MAGNESIUM SERPL-MCNC: 1.9 MG/DL (ref 1.6–2.6)
MCH RBC QN AUTO: 28.8 PG (ref 26.6–33)
MCHC RBC AUTO-ENTMCNC: 31.9 G/DL (ref 31.5–35.7)
MCV RBC AUTO: 90.3 FL (ref 79–97)
MONOCYTES # BLD AUTO: 0.48 10*3/MM3 (ref 0.1–0.9)
MONOCYTES NFR BLD AUTO: 15.4 % (ref 5–12)
NEUTROPHILS NFR BLD AUTO: 1.4 10*3/MM3 (ref 1.7–7)
NEUTROPHILS NFR BLD AUTO: 44.9 % (ref 42.7–76)
NRBC BLD AUTO-RTO: 0 /100 WBC (ref 0–0.2)
PLATELET # BLD AUTO: 120 10*3/MM3 (ref 140–450)
PMV BLD AUTO: 10.5 FL (ref 6–12)
POTASSIUM SERPL-SCNC: 4.4 MMOL/L (ref 3.5–5.2)
PROT SERPL-MCNC: 5.9 G/DL (ref 6–8.5)
RBC # BLD AUTO: 3.51 10*6/MM3 (ref 4.14–5.8)
SODIUM SERPL-SCNC: 133 MMOL/L (ref 136–145)
WBC NRBC COR # BLD AUTO: 3.12 10*3/MM3 (ref 3.4–10.8)

## 2025-04-29 PROCEDURE — 80053 COMPREHEN METABOLIC PANEL: CPT

## 2025-04-29 PROCEDURE — 83735 ASSAY OF MAGNESIUM: CPT

## 2025-04-29 PROCEDURE — 36415 COLL VENOUS BLD VENIPUNCTURE: CPT

## 2025-04-29 PROCEDURE — 85025 COMPLETE CBC W/AUTO DIFF WBC: CPT

## 2025-05-15 ENCOUNTER — HOSPITAL ENCOUNTER (OUTPATIENT)
Dept: MRI IMAGING | Facility: HOSPITAL | Age: 55
Discharge: HOME | End: 2025-05-15
Admitting: INTERNAL MEDICINE
Payer: COMMERCIAL

## 2025-05-15 DIAGNOSIS — G89.29 CHRONIC LOW BACK PAIN, UNSPECIFIED BACK PAIN LATERALITY, UNSPECIFIED WHETHER SCIATICA PRESENT: ICD-10-CM

## 2025-05-15 DIAGNOSIS — M54.50 CHRONIC LOW BACK PAIN, UNSPECIFIED BACK PAIN LATERALITY, UNSPECIFIED WHETHER SCIATICA PRESENT: ICD-10-CM

## 2025-05-15 PROCEDURE — A9577 INJ MULTIHANCE: HCPCS | Performed by: INTERNAL MEDICINE

## 2025-05-15 PROCEDURE — 72158 MRI LUMBAR SPINE W/O & W/DYE: CPT

## 2025-05-15 PROCEDURE — 25510000002 GADOBENATE DIMEGLUMINE 529 MG/ML SOLUTION: Performed by: INTERNAL MEDICINE

## 2025-05-15 RX ADMIN — GADOBENATE DIMEGLUMINE 22 ML: 529 INJECTION, SOLUTION INTRAVENOUS at 20:59

## 2025-05-20 ENCOUNTER — TRANSCRIBE ORDERS (OUTPATIENT)
Dept: LAB | Facility: HOSPITAL | Age: 55
End: 2025-05-20
Payer: COMMERCIAL

## 2025-05-20 ENCOUNTER — LAB (OUTPATIENT)
Dept: LAB | Facility: HOSPITAL | Age: 55
End: 2025-05-20
Payer: COMMERCIAL

## 2025-05-20 DIAGNOSIS — C18.6 MALIGNANT NEOPLASM OF DESCENDING COLON: ICD-10-CM

## 2025-05-20 DIAGNOSIS — C18.6 MALIGNANT NEOPLASM OF DESCENDING COLON: Primary | ICD-10-CM

## 2025-05-20 LAB
ALBUMIN SERPL-MCNC: 3.6 G/DL (ref 3.5–5.2)
ALBUMIN/GLOB SERPL: 1.4 G/DL
ALP SERPL-CCNC: 108 U/L (ref 39–117)
ALT SERPL W P-5'-P-CCNC: 42 U/L (ref 1–41)
ANION GAP SERPL CALCULATED.3IONS-SCNC: 12.2 MMOL/L (ref 5–15)
AST SERPL-CCNC: 44 U/L (ref 1–40)
BASOPHILS # BLD AUTO: 0.03 10*3/MM3 (ref 0–0.2)
BASOPHILS NFR BLD AUTO: 0.4 % (ref 0–1.5)
BILIRUB SERPL-MCNC: 0.3 MG/DL (ref 0–1.2)
BUN SERPL-MCNC: 20 MG/DL (ref 6–20)
BUN/CREAT SERPL: 17.9 (ref 7–25)
CALCIUM SPEC-SCNC: 8.7 MG/DL (ref 8.6–10.5)
CHLORIDE SERPL-SCNC: 103 MMOL/L (ref 98–107)
CO2 SERPL-SCNC: 20.8 MMOL/L (ref 22–29)
CREAT SERPL-MCNC: 1.12 MG/DL (ref 0.76–1.27)
DEPRECATED RDW RBC AUTO: 58.5 FL (ref 37–54)
EGFRCR SERPLBLD CKD-EPI 2021: 77.6 ML/MIN/1.73
EOSINOPHIL # BLD AUTO: 0.18 10*3/MM3 (ref 0–0.4)
EOSINOPHIL NFR BLD AUTO: 2.6 % (ref 0.3–6.2)
ERYTHROCYTE [DISTWIDTH] IN BLOOD BY AUTOMATED COUNT: 18.4 % (ref 12.3–15.4)
GLOBULIN UR ELPH-MCNC: 2.5 GM/DL
GLUCOSE SERPL-MCNC: 160 MG/DL (ref 65–99)
HCT VFR BLD AUTO: 33.6 % (ref 37.5–51)
HGB BLD-MCNC: 10.8 G/DL (ref 13–17.7)
IMM GRANULOCYTES # BLD AUTO: 0.29 10*3/MM3 (ref 0–0.05)
IMM GRANULOCYTES NFR BLD AUTO: 4.2 % (ref 0–0.5)
LYMPHOCYTES # BLD AUTO: 1.38 10*3/MM3 (ref 0.7–3.1)
LYMPHOCYTES NFR BLD AUTO: 20.1 % (ref 19.6–45.3)
MAGNESIUM SERPL-MCNC: 1.9 MG/DL (ref 1.6–2.6)
MCH RBC QN AUTO: 28.6 PG (ref 26.6–33)
MCHC RBC AUTO-ENTMCNC: 32.1 G/DL (ref 31.5–35.7)
MCV RBC AUTO: 89.1 FL (ref 79–97)
MONOCYTES # BLD AUTO: 0.89 10*3/MM3 (ref 0.1–0.9)
MONOCYTES NFR BLD AUTO: 12.9 % (ref 5–12)
NEUTROPHILS NFR BLD AUTO: 4.11 10*3/MM3 (ref 1.7–7)
NEUTROPHILS NFR BLD AUTO: 59.8 % (ref 42.7–76)
NRBC BLD AUTO-RTO: 0 /100 WBC (ref 0–0.2)
PLATELET # BLD AUTO: 136 10*3/MM3 (ref 140–450)
PMV BLD AUTO: 11 FL (ref 6–12)
POTASSIUM SERPL-SCNC: 4.6 MMOL/L (ref 3.5–5.2)
PROT SERPL-MCNC: 6.1 G/DL (ref 6–8.5)
RBC # BLD AUTO: 3.77 10*6/MM3 (ref 4.14–5.8)
SODIUM SERPL-SCNC: 136 MMOL/L (ref 136–145)
WBC NRBC COR # BLD AUTO: 6.88 10*3/MM3 (ref 3.4–10.8)

## 2025-05-20 PROCEDURE — 83735 ASSAY OF MAGNESIUM: CPT

## 2025-05-20 PROCEDURE — 85025 COMPLETE CBC W/AUTO DIFF WBC: CPT

## 2025-05-20 PROCEDURE — 80053 COMPREHEN METABOLIC PANEL: CPT

## 2025-05-20 PROCEDURE — 36415 COLL VENOUS BLD VENIPUNCTURE: CPT

## 2025-06-03 ENCOUNTER — TRANSCRIBE ORDERS (OUTPATIENT)
Dept: LAB | Facility: HOSPITAL | Age: 55
End: 2025-06-03
Payer: COMMERCIAL

## 2025-06-03 ENCOUNTER — LAB (OUTPATIENT)
Dept: LAB | Facility: HOSPITAL | Age: 55
End: 2025-06-03
Payer: COMMERCIAL

## 2025-06-03 DIAGNOSIS — C18.6 MALIGNANT NEOPLASM OF DESCENDING COLON: ICD-10-CM

## 2025-06-03 DIAGNOSIS — C18.6 MALIGNANT NEOPLASM OF DESCENDING COLON: Primary | ICD-10-CM

## 2025-06-03 LAB
ALBUMIN SERPL-MCNC: 3.6 G/DL (ref 3.5–5.2)
ALBUMIN/GLOB SERPL: 1.2 G/DL
ALP SERPL-CCNC: 105 U/L (ref 39–117)
ALT SERPL W P-5'-P-CCNC: 44 U/L (ref 1–41)
ANION GAP SERPL CALCULATED.3IONS-SCNC: 12.5 MMOL/L (ref 5–15)
AST SERPL-CCNC: 54 U/L (ref 1–40)
BASOPHILS # BLD AUTO: 0.02 10*3/MM3 (ref 0–0.2)
BASOPHILS NFR BLD AUTO: 0.4 % (ref 0–1.5)
BILIRUB SERPL-MCNC: 0.6 MG/DL (ref 0–1.2)
BUN SERPL-MCNC: 21 MG/DL (ref 6–20)
BUN/CREAT SERPL: 16.7 (ref 7–25)
CALCIUM SPEC-SCNC: 9.2 MG/DL (ref 8.6–10.5)
CHLORIDE SERPL-SCNC: 98 MMOL/L (ref 98–107)
CO2 SERPL-SCNC: 21.5 MMOL/L (ref 22–29)
CREAT SERPL-MCNC: 1.26 MG/DL (ref 0.76–1.27)
DEPRECATED RDW RBC AUTO: 60.7 FL (ref 37–54)
EGFRCR SERPLBLD CKD-EPI 2021: 67.4 ML/MIN/1.73
EOSINOPHIL # BLD AUTO: 0.11 10*3/MM3 (ref 0–0.4)
EOSINOPHIL NFR BLD AUTO: 2.4 % (ref 0.3–6.2)
ERYTHROCYTE [DISTWIDTH] IN BLOOD BY AUTOMATED COUNT: 18.7 % (ref 12.3–15.4)
GLOBULIN UR ELPH-MCNC: 3 GM/DL
GLUCOSE SERPL-MCNC: 176 MG/DL (ref 65–99)
HCT VFR BLD AUTO: 35.4 % (ref 37.5–51)
HGB BLD-MCNC: 11.4 G/DL (ref 13–17.7)
HYPOCHROMIA BLD QL: NORMAL
IMM GRANULOCYTES # BLD AUTO: 0.03 10*3/MM3 (ref 0–0.05)
IMM GRANULOCYTES NFR BLD AUTO: 0.7 % (ref 0–0.5)
LYMPHOCYTES # BLD AUTO: 1.1 10*3/MM3 (ref 0.7–3.1)
LYMPHOCYTES NFR BLD AUTO: 24 % (ref 19.6–45.3)
MAGNESIUM SERPL-MCNC: 1.8 MG/DL (ref 1.6–2.6)
MCH RBC QN AUTO: 28.7 PG (ref 26.6–33)
MCHC RBC AUTO-ENTMCNC: 32.2 G/DL (ref 31.5–35.7)
MCV RBC AUTO: 89.2 FL (ref 79–97)
MONOCYTES # BLD AUTO: 0.59 10*3/MM3 (ref 0.1–0.9)
MONOCYTES NFR BLD AUTO: 12.9 % (ref 5–12)
NEUTROPHILS NFR BLD AUTO: 2.74 10*3/MM3 (ref 1.7–7)
NEUTROPHILS NFR BLD AUTO: 59.6 % (ref 42.7–76)
NRBC BLD AUTO-RTO: 0 /100 WBC (ref 0–0.2)
OVALOCYTES BLD QL SMEAR: NORMAL
PLATELET # BLD AUTO: 104 10*3/MM3 (ref 140–450)
PMV BLD AUTO: 12.7 FL (ref 6–12)
POTASSIUM SERPL-SCNC: 5.3 MMOL/L (ref 3.5–5.2)
PROT SERPL-MCNC: 6.6 G/DL (ref 6–8.5)
RBC # BLD AUTO: 3.97 10*6/MM3 (ref 4.14–5.8)
SMALL PLATELETS BLD QL SMEAR: NORMAL
SODIUM SERPL-SCNC: 132 MMOL/L (ref 136–145)
WBC MORPH BLD: NORMAL
WBC NRBC COR # BLD AUTO: 4.59 10*3/MM3 (ref 3.4–10.8)

## 2025-06-03 PROCEDURE — 36415 COLL VENOUS BLD VENIPUNCTURE: CPT

## 2025-06-03 PROCEDURE — 80053 COMPREHEN METABOLIC PANEL: CPT

## 2025-06-03 PROCEDURE — 83735 ASSAY OF MAGNESIUM: CPT

## 2025-06-03 PROCEDURE — 85007 BL SMEAR W/DIFF WBC COUNT: CPT

## 2025-06-03 PROCEDURE — 85025 COMPLETE CBC W/AUTO DIFF WBC: CPT

## 2025-06-24 ENCOUNTER — LAB (OUTPATIENT)
Dept: LAB | Facility: HOSPITAL | Age: 55
End: 2025-06-24
Payer: COMMERCIAL

## 2025-06-24 ENCOUNTER — TRANSCRIBE ORDERS (OUTPATIENT)
Dept: LAB | Facility: HOSPITAL | Age: 55
End: 2025-06-24
Payer: COMMERCIAL

## 2025-06-24 DIAGNOSIS — C18.6 MALIGNANT NEOPLASM OF DESCENDING COLON: Primary | ICD-10-CM

## 2025-06-24 DIAGNOSIS — C18.6 MALIGNANT NEOPLASM OF DESCENDING COLON: ICD-10-CM

## 2025-06-24 LAB
ALBUMIN SERPL-MCNC: 3.6 G/DL (ref 3.5–5.2)
ALBUMIN/GLOB SERPL: 1.4 G/DL
ALP SERPL-CCNC: 82 U/L (ref 39–117)
ALT SERPL W P-5'-P-CCNC: 36 U/L (ref 1–41)
ANION GAP SERPL CALCULATED.3IONS-SCNC: 9.9 MMOL/L (ref 5–15)
AST SERPL-CCNC: 46 U/L (ref 1–40)
BASOPHILS # BLD AUTO: 0.04 10*3/MM3 (ref 0–0.2)
BASOPHILS NFR BLD AUTO: 0.6 % (ref 0–1.5)
BILIRUB SERPL-MCNC: 0.4 MG/DL (ref 0–1.2)
BUN SERPL-MCNC: 26 MG/DL (ref 6–20)
BUN/CREAT SERPL: 17.7 (ref 7–25)
CALCIUM SPEC-SCNC: 8.8 MG/DL (ref 8.6–10.5)
CHLORIDE SERPL-SCNC: 102 MMOL/L (ref 98–107)
CO2 SERPL-SCNC: 23.1 MMOL/L (ref 22–29)
CREAT SERPL-MCNC: 1.47 MG/DL (ref 0.76–1.27)
DEPRECATED RDW RBC AUTO: 57.8 FL (ref 37–54)
EGFRCR SERPLBLD CKD-EPI 2021: 56 ML/MIN/1.73
EOSINOPHIL # BLD AUTO: 0.15 10*3/MM3 (ref 0–0.4)
EOSINOPHIL NFR BLD AUTO: 2.3 % (ref 0.3–6.2)
ERYTHROCYTE [DISTWIDTH] IN BLOOD BY AUTOMATED COUNT: 18.2 % (ref 12.3–15.4)
GLOBULIN UR ELPH-MCNC: 2.6 GM/DL
GLUCOSE SERPL-MCNC: 164 MG/DL (ref 65–99)
HCT VFR BLD AUTO: 32.3 % (ref 37.5–51)
HGB BLD-MCNC: 10.6 G/DL (ref 13–17.7)
IMM GRANULOCYTES # BLD AUTO: 0.08 10*3/MM3 (ref 0–0.05)
IMM GRANULOCYTES NFR BLD AUTO: 1.2 % (ref 0–0.5)
LYMPHOCYTES # BLD AUTO: 1.36 10*3/MM3 (ref 0.7–3.1)
LYMPHOCYTES NFR BLD AUTO: 21.2 % (ref 19.6–45.3)
MAGNESIUM SERPL-MCNC: 1.8 MG/DL (ref 1.6–2.6)
MCH RBC QN AUTO: 28.6 PG (ref 26.6–33)
MCHC RBC AUTO-ENTMCNC: 32.8 G/DL (ref 31.5–35.7)
MCV RBC AUTO: 87.3 FL (ref 79–97)
MONOCYTES # BLD AUTO: 0.82 10*3/MM3 (ref 0.1–0.9)
MONOCYTES NFR BLD AUTO: 12.8 % (ref 5–12)
NEUTROPHILS NFR BLD AUTO: 3.97 10*3/MM3 (ref 1.7–7)
NEUTROPHILS NFR BLD AUTO: 61.9 % (ref 42.7–76)
NRBC BLD AUTO-RTO: 0 /100 WBC (ref 0–0.2)
PLATELET # BLD AUTO: 136 10*3/MM3 (ref 140–450)
PMV BLD AUTO: 10.2 FL (ref 6–12)
POTASSIUM SERPL-SCNC: 5 MMOL/L (ref 3.5–5.2)
PROT SERPL-MCNC: 6.2 G/DL (ref 6–8.5)
RBC # BLD AUTO: 3.7 10*6/MM3 (ref 4.14–5.8)
SODIUM SERPL-SCNC: 135 MMOL/L (ref 136–145)
WBC NRBC COR # BLD AUTO: 6.42 10*3/MM3 (ref 3.4–10.8)

## 2025-06-24 PROCEDURE — 85025 COMPLETE CBC W/AUTO DIFF WBC: CPT

## 2025-06-24 PROCEDURE — 36415 COLL VENOUS BLD VENIPUNCTURE: CPT

## 2025-06-24 PROCEDURE — 80053 COMPREHEN METABOLIC PANEL: CPT

## 2025-06-24 PROCEDURE — 83735 ASSAY OF MAGNESIUM: CPT

## 2025-07-08 ENCOUNTER — LAB (OUTPATIENT)
Dept: LAB | Facility: HOSPITAL | Age: 55
End: 2025-07-08
Payer: COMMERCIAL

## 2025-07-08 ENCOUNTER — TRANSCRIBE ORDERS (OUTPATIENT)
Dept: LAB | Facility: HOSPITAL | Age: 55
End: 2025-07-08
Payer: COMMERCIAL

## 2025-07-08 DIAGNOSIS — C18.6 MALIGNANT NEOPLASM OF DESCENDING COLON: ICD-10-CM

## 2025-07-08 DIAGNOSIS — C18.6 MALIGNANT NEOPLASM OF DESCENDING COLON: Primary | ICD-10-CM

## 2025-07-08 LAB
ALBUMIN SERPL-MCNC: 3.5 G/DL (ref 3.5–5.2)
ALBUMIN/GLOB SERPL: 1.3 G/DL
ALP SERPL-CCNC: 101 U/L (ref 39–117)
ALT SERPL W P-5'-P-CCNC: 62 U/L (ref 1–41)
ANION GAP SERPL CALCULATED.3IONS-SCNC: 12.7 MMOL/L (ref 5–15)
AST SERPL-CCNC: 50 U/L (ref 1–40)
BASOPHILS # BLD AUTO: 0.02 10*3/MM3 (ref 0–0.2)
BASOPHILS NFR BLD AUTO: 0.3 % (ref 0–1.5)
BILIRUB SERPL-MCNC: 0.5 MG/DL (ref 0–1.2)
BUN SERPL-MCNC: 18 MG/DL (ref 6–20)
BUN/CREAT SERPL: 15.9 (ref 7–25)
CALCIUM SPEC-SCNC: 9.1 MG/DL (ref 8.6–10.5)
CHLORIDE SERPL-SCNC: 100 MMOL/L (ref 98–107)
CO2 SERPL-SCNC: 22.3 MMOL/L (ref 22–29)
CREAT SERPL-MCNC: 1.13 MG/DL (ref 0.76–1.27)
DEPRECATED RDW RBC AUTO: 61.3 FL (ref 37–54)
EGFRCR SERPLBLD CKD-EPI 2021: 76.8 ML/MIN/1.73
EOSINOPHIL # BLD AUTO: 0.21 10*3/MM3 (ref 0–0.4)
EOSINOPHIL NFR BLD AUTO: 3.1 % (ref 0.3–6.2)
ERYTHROCYTE [DISTWIDTH] IN BLOOD BY AUTOMATED COUNT: 19.5 % (ref 12.3–15.4)
GLOBULIN UR ELPH-MCNC: 2.8 GM/DL
GLUCOSE SERPL-MCNC: 134 MG/DL (ref 65–99)
HCT VFR BLD AUTO: 34.4 % (ref 37.5–51)
HGB BLD-MCNC: 11.2 G/DL (ref 13–17.7)
IMM GRANULOCYTES # BLD AUTO: 0.03 10*3/MM3 (ref 0–0.05)
IMM GRANULOCYTES NFR BLD AUTO: 0.4 % (ref 0–0.5)
LYMPHOCYTES # BLD AUTO: 1.63 10*3/MM3 (ref 0.7–3.1)
LYMPHOCYTES NFR BLD AUTO: 24.4 % (ref 19.6–45.3)
MAGNESIUM SERPL-MCNC: 1.7 MG/DL (ref 1.6–2.6)
MCH RBC QN AUTO: 28.6 PG (ref 26.6–33)
MCHC RBC AUTO-ENTMCNC: 32.6 G/DL (ref 31.5–35.7)
MCV RBC AUTO: 87.8 FL (ref 79–97)
MONOCYTES # BLD AUTO: 0.7 10*3/MM3 (ref 0.1–0.9)
MONOCYTES NFR BLD AUTO: 10.5 % (ref 5–12)
NEUTROPHILS NFR BLD AUTO: 4.08 10*3/MM3 (ref 1.7–7)
NEUTROPHILS NFR BLD AUTO: 61.3 % (ref 42.7–76)
NRBC BLD AUTO-RTO: 0 /100 WBC (ref 0–0.2)
PLATELET # BLD AUTO: 133 10*3/MM3 (ref 140–450)
PMV BLD AUTO: 10.6 FL (ref 6–12)
POTASSIUM SERPL-SCNC: 4.5 MMOL/L (ref 3.5–5.2)
PROT SERPL-MCNC: 6.3 G/DL (ref 6–8.5)
RBC # BLD AUTO: 3.92 10*6/MM3 (ref 4.14–5.8)
SODIUM SERPL-SCNC: 135 MMOL/L (ref 136–145)
WBC NRBC COR # BLD AUTO: 6.67 10*3/MM3 (ref 3.4–10.8)

## 2025-07-08 PROCEDURE — 80053 COMPREHEN METABOLIC PANEL: CPT

## 2025-07-08 PROCEDURE — 85025 COMPLETE CBC W/AUTO DIFF WBC: CPT

## 2025-07-08 PROCEDURE — 36415 COLL VENOUS BLD VENIPUNCTURE: CPT

## 2025-07-08 PROCEDURE — 83735 ASSAY OF MAGNESIUM: CPT

## 2025-07-22 ENCOUNTER — TRANSCRIBE ORDERS (OUTPATIENT)
Dept: LAB | Facility: HOSPITAL | Age: 55
End: 2025-07-22
Payer: COMMERCIAL

## 2025-07-22 ENCOUNTER — LAB (OUTPATIENT)
Dept: LAB | Facility: HOSPITAL | Age: 55
End: 2025-07-22
Payer: COMMERCIAL

## 2025-07-22 DIAGNOSIS — C18.6 MALIGNANT NEOPLASM OF DESCENDING COLON: ICD-10-CM

## 2025-07-22 DIAGNOSIS — C18.6 MALIGNANT NEOPLASM OF DESCENDING COLON: Primary | ICD-10-CM

## 2025-07-22 LAB
ALBUMIN SERPL-MCNC: 3.6 G/DL (ref 3.5–5.2)
ALBUMIN/GLOB SERPL: 1.4 G/DL
ALP SERPL-CCNC: 106 U/L (ref 39–117)
ALT SERPL W P-5'-P-CCNC: 58 U/L (ref 1–41)
ANION GAP SERPL CALCULATED.3IONS-SCNC: 10.3 MMOL/L (ref 5–15)
AST SERPL-CCNC: 33 U/L (ref 1–40)
BASOPHILS # BLD AUTO: 0.01 10*3/MM3 (ref 0–0.2)
BASOPHILS NFR BLD AUTO: 0.2 % (ref 0–1.5)
BILIRUB SERPL-MCNC: 0.5 MG/DL (ref 0–1.2)
BUN SERPL-MCNC: 19 MG/DL (ref 6–20)
BUN/CREAT SERPL: 17 (ref 7–25)
CALCIUM SPEC-SCNC: 8.6 MG/DL (ref 8.6–10.5)
CHLORIDE SERPL-SCNC: 103 MMOL/L (ref 98–107)
CO2 SERPL-SCNC: 22.7 MMOL/L (ref 22–29)
CREAT SERPL-MCNC: 1.12 MG/DL (ref 0.76–1.27)
DEPRECATED RDW RBC AUTO: 62.4 FL (ref 37–54)
EGFRCR SERPLBLD CKD-EPI 2021: 77.6 ML/MIN/1.73
EOSINOPHIL # BLD AUTO: 0.04 10*3/MM3 (ref 0–0.4)
EOSINOPHIL NFR BLD AUTO: 0.7 % (ref 0.3–6.2)
ERYTHROCYTE [DISTWIDTH] IN BLOOD BY AUTOMATED COUNT: 19.6 % (ref 12.3–15.4)
GLOBULIN UR ELPH-MCNC: 2.5 GM/DL
GLUCOSE SERPL-MCNC: 149 MG/DL (ref 65–99)
HCT VFR BLD AUTO: 34.8 % (ref 37.5–51)
HGB BLD-MCNC: 11.5 G/DL (ref 13–17.7)
IMM GRANULOCYTES # BLD AUTO: 0.02 10*3/MM3 (ref 0–0.05)
IMM GRANULOCYTES NFR BLD AUTO: 0.4 % (ref 0–0.5)
LYMPHOCYTES # BLD AUTO: 1.6 10*3/MM3 (ref 0.7–3.1)
LYMPHOCYTES NFR BLD AUTO: 29.6 % (ref 19.6–45.3)
MAGNESIUM SERPL-MCNC: 1.7 MG/DL (ref 1.6–2.6)
MCH RBC QN AUTO: 28.8 PG (ref 26.6–33)
MCHC RBC AUTO-ENTMCNC: 33 G/DL (ref 31.5–35.7)
MCV RBC AUTO: 87.2 FL (ref 79–97)
MONOCYTES # BLD AUTO: 0.71 10*3/MM3 (ref 0.1–0.9)
MONOCYTES NFR BLD AUTO: 13.1 % (ref 5–12)
NEUTROPHILS NFR BLD AUTO: 3.03 10*3/MM3 (ref 1.7–7)
NEUTROPHILS NFR BLD AUTO: 56 % (ref 42.7–76)
NRBC BLD AUTO-RTO: 0 /100 WBC (ref 0–0.2)
PLATELET # BLD AUTO: 124 10*3/MM3 (ref 140–450)
PMV BLD AUTO: 9.9 FL (ref 6–12)
POTASSIUM SERPL-SCNC: 4.4 MMOL/L (ref 3.5–5.2)
PROT SERPL-MCNC: 6.1 G/DL (ref 6–8.5)
RBC # BLD AUTO: 3.99 10*6/MM3 (ref 4.14–5.8)
SODIUM SERPL-SCNC: 136 MMOL/L (ref 136–145)
WBC NRBC COR # BLD AUTO: 5.41 10*3/MM3 (ref 3.4–10.8)

## 2025-07-22 PROCEDURE — 36415 COLL VENOUS BLD VENIPUNCTURE: CPT

## 2025-07-22 PROCEDURE — 80053 COMPREHEN METABOLIC PANEL: CPT

## 2025-07-22 PROCEDURE — 85025 COMPLETE CBC W/AUTO DIFF WBC: CPT

## 2025-07-22 PROCEDURE — 83735 ASSAY OF MAGNESIUM: CPT

## 2025-07-30 ENCOUNTER — TRANSCRIBE ORDERS (OUTPATIENT)
Dept: LAB | Facility: HOSPITAL | Age: 55
End: 2025-07-30
Payer: COMMERCIAL

## 2025-07-30 ENCOUNTER — LAB (OUTPATIENT)
Dept: LAB | Facility: HOSPITAL | Age: 55
End: 2025-07-30
Payer: COMMERCIAL

## 2025-07-30 DIAGNOSIS — C18.6 MALIGNANT NEOPLASM OF DESCENDING COLON: Primary | ICD-10-CM

## 2025-07-30 DIAGNOSIS — C18.6 MALIGNANT NEOPLASM OF DESCENDING COLON: ICD-10-CM

## 2025-07-30 LAB
ALBUMIN SERPL-MCNC: 3.6 G/DL (ref 3.5–5.2)
ALBUMIN/GLOB SERPL: 1.3 G/DL
ALP SERPL-CCNC: 108 U/L (ref 39–117)
ALT SERPL W P-5'-P-CCNC: 46 U/L (ref 1–41)
ANION GAP SERPL CALCULATED.3IONS-SCNC: 12.1 MMOL/L (ref 5–15)
AST SERPL-CCNC: 35 U/L (ref 1–40)
BASOPHILS # BLD AUTO: 0.03 10*3/MM3 (ref 0–0.2)
BASOPHILS NFR BLD AUTO: 0.3 % (ref 0–1.5)
BILIRUB SERPL-MCNC: 0.5 MG/DL (ref 0–1.2)
BUN SERPL-MCNC: 20 MG/DL (ref 6–20)
BUN/CREAT SERPL: 14.9 (ref 7–25)
CALCIUM SPEC-SCNC: 9.1 MG/DL (ref 8.6–10.5)
CHLORIDE SERPL-SCNC: 100 MMOL/L (ref 98–107)
CO2 SERPL-SCNC: 20.9 MMOL/L (ref 22–29)
CREAT SERPL-MCNC: 1.34 MG/DL (ref 0.76–1.27)
DEPRECATED RDW RBC AUTO: 61.2 FL (ref 37–54)
EGFRCR SERPLBLD CKD-EPI 2021: 62.6 ML/MIN/1.73
EOSINOPHIL # BLD AUTO: 0.1 10*3/MM3 (ref 0–0.4)
EOSINOPHIL NFR BLD AUTO: 1 % (ref 0.3–6.2)
ERYTHROCYTE [DISTWIDTH] IN BLOOD BY AUTOMATED COUNT: 18.9 % (ref 12.3–15.4)
GLOBULIN UR ELPH-MCNC: 2.8 GM/DL
GLUCOSE SERPL-MCNC: 146 MG/DL (ref 65–99)
HCT VFR BLD AUTO: 35.6 % (ref 37.5–51)
HGB BLD-MCNC: 11.7 G/DL (ref 13–17.7)
IMM GRANULOCYTES # BLD AUTO: 0.18 10*3/MM3 (ref 0–0.05)
IMM GRANULOCYTES NFR BLD AUTO: 1.8 % (ref 0–0.5)
LYMPHOCYTES # BLD AUTO: 1.79 10*3/MM3 (ref 0.7–3.1)
LYMPHOCYTES NFR BLD AUTO: 18 % (ref 19.6–45.3)
MAGNESIUM SERPL-MCNC: 1.8 MG/DL (ref 1.6–2.6)
MCH RBC QN AUTO: 28.7 PG (ref 26.6–33)
MCHC RBC AUTO-ENTMCNC: 32.9 G/DL (ref 31.5–35.7)
MCV RBC AUTO: 87.5 FL (ref 79–97)
MONOCYTES # BLD AUTO: 1.08 10*3/MM3 (ref 0.1–0.9)
MONOCYTES NFR BLD AUTO: 10.9 % (ref 5–12)
NEUTROPHILS NFR BLD AUTO: 6.75 10*3/MM3 (ref 1.7–7)
NEUTROPHILS NFR BLD AUTO: 68 % (ref 42.7–76)
NRBC BLD AUTO-RTO: 0 /100 WBC (ref 0–0.2)
PLATELET # BLD AUTO: 139 10*3/MM3 (ref 140–450)
PMV BLD AUTO: 11.2 FL (ref 6–12)
POTASSIUM SERPL-SCNC: 5.1 MMOL/L (ref 3.5–5.2)
PROT SERPL-MCNC: 6.4 G/DL (ref 6–8.5)
RBC # BLD AUTO: 4.07 10*6/MM3 (ref 4.14–5.8)
SODIUM SERPL-SCNC: 133 MMOL/L (ref 136–145)
WBC NRBC COR # BLD AUTO: 9.93 10*3/MM3 (ref 3.4–10.8)

## 2025-07-30 PROCEDURE — 85025 COMPLETE CBC W/AUTO DIFF WBC: CPT

## 2025-07-30 PROCEDURE — 80053 COMPREHEN METABOLIC PANEL: CPT

## 2025-07-30 PROCEDURE — 36415 COLL VENOUS BLD VENIPUNCTURE: CPT

## 2025-07-30 PROCEDURE — 83735 ASSAY OF MAGNESIUM: CPT

## 2025-08-19 ENCOUNTER — LAB (OUTPATIENT)
Dept: LAB | Facility: HOSPITAL | Age: 55
End: 2025-08-19
Payer: COMMERCIAL

## 2025-08-19 ENCOUNTER — TRANSCRIBE ORDERS (OUTPATIENT)
Dept: LAB | Facility: HOSPITAL | Age: 55
End: 2025-08-19
Payer: COMMERCIAL

## 2025-08-19 DIAGNOSIS — C18.6 MALIGNANT NEOPLASM OF DESCENDING COLON: Primary | ICD-10-CM

## 2025-08-19 DIAGNOSIS — C18.6 MALIGNANT NEOPLASM OF DESCENDING COLON: ICD-10-CM

## 2025-08-19 LAB
ALBUMIN SERPL-MCNC: 3.8 G/DL (ref 3.5–5.2)
ALBUMIN/GLOB SERPL: 1.3 G/DL
ALP SERPL-CCNC: 89 U/L (ref 39–117)
ALT SERPL W P-5'-P-CCNC: 43 U/L (ref 1–41)
ANION GAP SERPL CALCULATED.3IONS-SCNC: 11.2 MMOL/L (ref 5–15)
AST SERPL-CCNC: 36 U/L (ref 1–40)
BASOPHILS # BLD AUTO: 0.04 10*3/MM3 (ref 0–0.2)
BASOPHILS NFR BLD AUTO: 0.4 % (ref 0–1.5)
BILIRUB SERPL-MCNC: 0.5 MG/DL (ref 0–1.2)
BUN SERPL-MCNC: 16 MG/DL (ref 6–20)
BUN/CREAT SERPL: 15.7 (ref 7–25)
CALCIUM SPEC-SCNC: 9.3 MG/DL (ref 8.6–10.5)
CHLORIDE SERPL-SCNC: 104 MMOL/L (ref 98–107)
CO2 SERPL-SCNC: 23.8 MMOL/L (ref 22–29)
CREAT SERPL-MCNC: 1.02 MG/DL (ref 0.76–1.27)
DEPRECATED RDW RBC AUTO: 60.4 FL (ref 37–54)
EGFRCR SERPLBLD CKD-EPI 2021: 86.8 ML/MIN/1.73
EOSINOPHIL # BLD AUTO: 0.01 10*3/MM3 (ref 0–0.4)
EOSINOPHIL NFR BLD AUTO: 0.1 % (ref 0.3–6.2)
ERYTHROCYTE [DISTWIDTH] IN BLOOD BY AUTOMATED COUNT: 18.7 % (ref 12.3–15.4)
GLOBULIN UR ELPH-MCNC: 2.9 GM/DL
GLUCOSE SERPL-MCNC: 129 MG/DL (ref 65–99)
HCT VFR BLD AUTO: 36.5 % (ref 37.5–51)
HGB BLD-MCNC: 11.9 G/DL (ref 13–17.7)
IMM GRANULOCYTES # BLD AUTO: 0.42 10*3/MM3 (ref 0–0.05)
IMM GRANULOCYTES NFR BLD AUTO: 3.9 % (ref 0–0.5)
LYMPHOCYTES # BLD AUTO: 1.65 10*3/MM3 (ref 0.7–3.1)
LYMPHOCYTES NFR BLD AUTO: 15.2 % (ref 19.6–45.3)
MAGNESIUM SERPL-MCNC: 1.9 MG/DL (ref 1.6–2.6)
MCH RBC QN AUTO: 28.7 PG (ref 26.6–33)
MCHC RBC AUTO-ENTMCNC: 32.6 G/DL (ref 31.5–35.7)
MCV RBC AUTO: 88.2 FL (ref 79–97)
MONOCYTES # BLD AUTO: 1.04 10*3/MM3 (ref 0.1–0.9)
MONOCYTES NFR BLD AUTO: 9.6 % (ref 5–12)
NEUTROPHILS NFR BLD AUTO: 7.67 10*3/MM3 (ref 1.7–7)
NEUTROPHILS NFR BLD AUTO: 70.8 % (ref 42.7–76)
NRBC BLD AUTO-RTO: 0 /100 WBC (ref 0–0.2)
PLATELET # BLD AUTO: 163 10*3/MM3 (ref 140–450)
PMV BLD AUTO: 9.7 FL (ref 6–12)
POTASSIUM SERPL-SCNC: 4.8 MMOL/L (ref 3.5–5.2)
PROT SERPL-MCNC: 6.7 G/DL (ref 6–8.5)
RBC # BLD AUTO: 4.14 10*6/MM3 (ref 4.14–5.8)
SODIUM SERPL-SCNC: 139 MMOL/L (ref 136–145)
WBC NRBC COR # BLD AUTO: 10.83 10*3/MM3 (ref 3.4–10.8)

## 2025-08-19 PROCEDURE — 85025 COMPLETE CBC W/AUTO DIFF WBC: CPT

## 2025-08-19 PROCEDURE — 36415 COLL VENOUS BLD VENIPUNCTURE: CPT

## 2025-08-19 PROCEDURE — 83735 ASSAY OF MAGNESIUM: CPT

## 2025-08-19 PROCEDURE — 80053 COMPREHEN METABOLIC PANEL: CPT

## (undated) DEVICE — HYBRID TUBING/CAP SET FOR OLYMPUS® SCOPES: Brand: ERBE

## (undated) DEVICE — Device: Brand: SPOT EX ENDOSCOPIC TATTOO

## (undated) DEVICE — CONMED SCOPE SAVER BITE BLOCK, 20X27 MM: Brand: SCOPE SAVER

## (undated) DEVICE — VLV SXN AIR/H2O ORCAPOD3 1P/U STRL

## (undated) DEVICE — LUBE JELLY PK/2.75GM STRL BX/144

## (undated) DEVICE — SINGLE-USE POLYPECTOMY SNARE: Brand: CAPTIVATOR II

## (undated) DEVICE — NDL SCLEROTHERAPY INTERJECT 25G 4 240CM

## (undated) DEVICE — Device

## (undated) DEVICE — ENDOSCOPY PORT CONNECTOR FOR OLYMPUS® SCOPES: Brand: ERBE

## (undated) DEVICE — FRCP BX RADJAW4 NDL 2.8 240 STD OG

## (undated) DEVICE — QUICK CATCH IN-LINE SUCTION POLYP TRAP IS USED FOR SUCTION RETRIEVAL OF ENDOSCOPICALLY REMOVED POLYPS.